# Patient Record
Sex: FEMALE | Race: WHITE | NOT HISPANIC OR LATINO | Employment: FULL TIME | URBAN - METROPOLITAN AREA
[De-identification: names, ages, dates, MRNs, and addresses within clinical notes are randomized per-mention and may not be internally consistent; named-entity substitution may affect disease eponyms.]

---

## 2017-01-04 ENCOUNTER — HOSPITAL ENCOUNTER (OUTPATIENT)
Dept: NON INVASIVE DIAGNOSTICS | Facility: HOSPITAL | Age: 52
Discharge: HOME | End: 2017-01-04
Attending: INTERNAL MEDICINE
Payer: COMMERCIAL

## 2017-01-04 DIAGNOSIS — I50.9 HEART FAILURE (HCC): ICD-10-CM

## 2017-01-04 PROCEDURE — C8929 TTE W OR WO FOL WCON,DOPPLER: HCPCS

## 2017-01-04 RX ADMIN — PERFLUTREN 1.3 ML/MIN: 6.52 INJECTION, SUSPENSION INTRAVENOUS at 07:50

## 2017-02-08 ENCOUNTER — GENERIC CONVERSION - ENCOUNTER (OUTPATIENT)
Dept: OTHER | Facility: OTHER | Age: 52
End: 2017-02-08

## 2017-02-16 ENCOUNTER — ALLSCRIPTS OFFICE VISIT (OUTPATIENT)
Dept: OTHER | Facility: OTHER | Age: 52
End: 2017-02-16

## 2017-04-05 ENCOUNTER — ALLSCRIPTS OFFICE VISIT (OUTPATIENT)
Dept: OTHER | Facility: OTHER | Age: 52
End: 2017-04-05

## 2017-06-23 ENCOUNTER — ALLSCRIPTS OFFICE VISIT (OUTPATIENT)
Dept: OTHER | Facility: OTHER | Age: 52
End: 2017-06-23

## 2017-06-23 DIAGNOSIS — I50.1 LEFT VENTRICULAR FAILURE (HCC): ICD-10-CM

## 2017-07-14 ENCOUNTER — GENERIC CONVERSION - ENCOUNTER (OUTPATIENT)
Dept: OTHER | Facility: OTHER | Age: 52
End: 2017-07-14

## 2017-07-19 LAB
ALBUMIN SERPL BCP-MCNC: 4.1 G/DL
ALP SERPL-CCNC: 70 U/L
ALT SERPL W P-5'-P-CCNC: 13 U/L
ANION GAP SERPL CALCULATED.3IONS-SCNC: 1.3 MMOL/L
AST SERPL W P-5'-P-CCNC: 12 U/L
BILIRUB SERPL-MCNC: 0.6 MG/DL
BUN SERPL-MCNC: 16 MG/DL
CALCIUM SERPL-MCNC: 8.9 MG/DL
CHLORIDE SERPL-SCNC: 96 MMOL/L
CO2 SERPL-SCNC: 23 MMOL/L
CREAT SERPL-MCNC: 0.76 MG/DL
EGFR AFRICAN AMERICAN (HISTORICAL): 107
EGFR-AMERICAN CALC (HISTORICAL): 92
GLUCOSE SERPL-MCNC: 141 MG/DL
HBA1C MFR BLD HPLC: 6.9 %
MAGNESIUM SERPL-MCNC: 1.9 MG/DL
POTASSIUM SERPL-SCNC: 4.4 MMOL/L
SODIUM SERPL-SCNC: 137 MMOL/L
TOTAL PROTEIN (HISTORICAL): 7.3

## 2017-09-28 ENCOUNTER — ALLSCRIPTS OFFICE VISIT (OUTPATIENT)
Dept: OTHER | Facility: OTHER | Age: 52
End: 2017-09-28

## 2017-09-28 ENCOUNTER — GENERIC CONVERSION - ENCOUNTER (OUTPATIENT)
Dept: OTHER | Facility: OTHER | Age: 52
End: 2017-09-28

## 2017-10-13 ENCOUNTER — ALLSCRIPTS OFFICE VISIT (OUTPATIENT)
Dept: OTHER | Facility: OTHER | Age: 52
End: 2017-10-13

## 2017-11-21 DIAGNOSIS — Z12.31 ENCOUNTER FOR SCREENING MAMMOGRAM FOR MALIGNANT NEOPLASM OF BREAST: ICD-10-CM

## 2018-01-10 ENCOUNTER — ALLSCRIPTS OFFICE VISIT (OUTPATIENT)
Dept: OTHER | Facility: OTHER | Age: 53
End: 2018-01-10

## 2018-01-10 DIAGNOSIS — M53.3 SACROCOCCYGEAL DISORDERS, NOT ELSEWHERE CLASSIFIED: ICD-10-CM

## 2018-01-10 DIAGNOSIS — M54.50 LOW BACK PAIN: ICD-10-CM

## 2018-01-11 ENCOUNTER — TRANSCRIBE ORDERS (OUTPATIENT)
Dept: ADMINISTRATIVE | Facility: HOSPITAL | Age: 53
End: 2018-01-11

## 2018-01-11 ENCOUNTER — HOSPITAL ENCOUNTER (OUTPATIENT)
Dept: RADIOLOGY | Facility: HOSPITAL | Age: 53
Discharge: HOME | End: 2018-01-11
Payer: COMMERCIAL

## 2018-01-11 ENCOUNTER — GENERIC CONVERSION - ENCOUNTER (OUTPATIENT)
Dept: OTHER | Facility: OTHER | Age: 53
End: 2018-01-11

## 2018-01-11 DIAGNOSIS — M54.50 LOW BACK PAIN: ICD-10-CM

## 2018-01-11 DIAGNOSIS — M53.3 SACROCOCCYGEAL DISORDERS, NOT ELSEWHERE CLASSIFIED: ICD-10-CM

## 2018-01-11 PROCEDURE — 72220 X-RAY EXAM SACRUM TAILBONE: CPT

## 2018-01-11 PROCEDURE — 72100 X-RAY EXAM L-S SPINE 2/3 VWS: CPT

## 2018-01-12 VITALS
SYSTOLIC BLOOD PRESSURE: 125 MMHG | DIASTOLIC BLOOD PRESSURE: 70 MMHG | OXYGEN SATURATION: 96 % | HEART RATE: 84 BPM | BODY MASS INDEX: 44.41 KG/M2 | HEIGHT: 68 IN | WEIGHT: 293 LBS

## 2018-01-12 NOTE — PROGRESS NOTES
Assessment   1  Sacral back pain (724 6) (M53 3)   2  Low back pain (724 2) (M54 5)   3  BMI 50 0-59 9, adult (V85 43) (Z68 43)   4  Acute systolic congestive heart failure (428 21,428 0) (I50 21)    Plan   Low back pain, Sacral back pain    · * XR SACRUM AND COCCYX; Status:Active; Requested [id number];    · * XR SPINE LUMBAR 2 OR 3 VIEWS INJURY; Status:Active; Requested [id number]; Discussion/Summary      XR ordered given trauma  Advised to continue Aleve, may take 2 every 12 hours  Possible side effects of new medications were reviewed with the patient/guardian today  The treatment plan was reviewed with the patient/guardian  The patient/guardian understands and agrees with the treatment plan      Chief Complaint   Pt c/o pain in her tail bone for three weeks  states fell at home on Tomasz day  er/cma  History of Present Illness   HPI: She fell on the ice and landed on her bottom almost 3 weeks ago  Pain seems to be getting worse  Worse when she sits on a soft surface, feels better on a hard surface  Has discomfort when she moves her bowels  radiates into her lower back, but not down her legs bowel or bladder dysfunction  Denies saddle anesthesia  tried Aleve and Ibuprofen which do not help  abrasion or bruising  numbness or tingling  Review of Systems        Constitutional: No fever, no chills, feels well, no tiredness, no recent weight gain or loss  Musculoskeletal: as noted in HPI  Integumentary: no skin wound  Neurological: no numbness-- and-- no tingling  Active Problems   1  Acute systolic congestive heart failure (428 21,428 0) (I50 21)   2  Benign essential hypertension (401 1) (I10)   3  Bilateral leg edema (782 3) (R60 0)   4  BMI 50 0-59 9, adult (V85 43) (Z68 43)   5  Chronic eczema (692 9) (L30 9)   6  CKD stage 1 due to type 2 diabetes mellitus (250 40,585 1) (E11 22,N18 1)   7  Degenerative arthritis of thoracic spine (721 2) (M47 814)   8   Depression screening (V79 0) (Z13 89)   9  Diabetes mellitus with renal manifestations, uncontrolled (250 42) (E11 29,E11 65)   10  General medical examination (V70 9) (Z00 00)   11  Generalized anxiety disorder (300 02) (F41 1)   12  Heart failure, left, with LVEF 41-49% (428 1) (I50 1)   13  Immunization due (V05 9) (Z23)   14  LBBB (left bundle branch block) (426 3) (I44 7)   15  Moderate obstructive sleep apnea (327 23) (G47 33)   16  Morbid obesity (278 01) (E66 01)   17  Never a smoker   18  ARMANDO (obstructive sleep apnea) (327 23) (G47 33)   19  Screening breast examination (V76 10) (Z12 31)    Past Medical History   1  History of Achilles tendinitis, unspecified laterality (726 71) (M76 60)   2  History of Acute lower UTI (599 0) (N39 0)   3  History of Acute maxillary sinusitis (461 0) (J01 00)   4  History of Acute nonsuppurative otitis media, unspecified laterality (381 00) (H65 199)   5  Acute otitis media, right (382 9) (H66 91)   6  History of Acute upper respiratory infection (465 9) (J06 9)   7  History of Acute upper respiratory infection (465 9) (J06 9)   8  History of Acute URI (465 9) (J06 9)   9  History of Acute UTI (599 0) (N39 0)   10  Benign essential hypertension (401 1) (I10)   11  History of Breast pain (611 71) (N64 4)   12  History of Bronchitis, asthmatic (493 90) (J45 909)   13  History of Chest pain on breathing (786 52) (R07 1)   14  History of Chronic kidney disease, stage I (585 1) (N18 1)   15  History of Contact dermatitis due to plant (692 6) (L25 5)   16  History of Cramps, muscle, general (729 82) (R25 2)   17  Exposure to potentially hazardous body fluids (V15 85) (Z77 21)   18  History of acute bronchitis (V12 69) (Z87 09)   19  History of acute pharyngitis (V12 69) (Z87 09)   20  History of acute pharyngitis (V12 69) (Z87 09)   21  History of acute sinusitis (V12 69) (Z87 09)   22  History of arthritis (V13 4) (Z87 39)   23  History of asthma (V12 69) (Z87 09)   24   History of backache (V13 59) (Z87 39)   25  History of breast lump (V13 89) (Z87 898)   26  History of dermatitis (V13 3) (Z87 2)   27  History of dermatitis (V13 3) (Z87 2)   28  History of diabetes mellitus (V12 29) (Z86 39)   29  History of environmental allergies (V15 09) (Z91 09)   30  History of hypertension (V12 59) (Z86 79)   31  History of infectious mononucleosis (V12 09) (Z86 19)   32  History of lipoma (V13 89) (Z86 018)   33  History of sleep apnea (V13 89) (Z86 69)   34  History of tension headache (V13 89) (Z87 898)   35  History of tinea cruris (V12 09) (Z86 19)   36  History of Hospital discharge follow-up (V67 59) (Z09)   37  History of Late effect of sprain and strain (905 7)   38  History of Lateral epicondylitis, unspecified laterality (726 32) (M77 10)   39  History of Mid back pain on right side (724 5) (M54 9)   40  History of Neoplasm of bone (239 2) (D49 2)   41  History of Otalgia, unspecified laterality (388 70) (H92 09)   42  History of Other muscle spasm (728 85) (M62 838)   43  History of Pregnancy Complicated By Diabetes Mellitus (V12 21)   44  History of Sialodochitis (527 2)   45  History of Situational anxiety (300 09) (F41 8)   46  History of Tongue disorder (529 9) (K14 9)   47  History of Trauma to eye, left (921 9) (S05 92XA)   48  History of Type 2 diabetes mellitus with renal manifestations, controlled (250 40) (E11 29)   49  History of Urinary Tract Infection (V13 02)  Active Problems And Past Medical History Reviewed: The active problems and past medical history were reviewed and updated today  Family History   Mother    1  Family history of Diabetes Mellitus (V18 0)   2  Family history of diabetes mellitus (V18 0) (Z83 3)  Father    3  Family history of Atrial Fibrillation   4  Family history of arthritis (V17 7) (Z82 61)  Sister    5  Family history of arthritis (V17 7) (Z82 61)  Grandparent    6  Family history of cardiac disorder (V17 49) (Z82 49)   7   Family history of diabetes mellitus (V18 0) (Z83 3)   8  Family history of malignant neoplasm (V16 9) (Z80 9)  Family History    9  Family history of arthritis (V17 7) (Z82 61)    Social History    ·    · Employed   · Has 2 children   · Lack of adequate sleep (V69 4) (Z72 820)   · Lack of exercise (V69 0) (Z72 3)   · Never a smoker   · Never a smoker   · No alcohol use   · Pets/Animals: Dog   ·  (V61 03) (Z63 5)  The social history was reviewed and is unchanged  Surgical History   1  History of Colonoscopy (Fiberoptic) Screening   2  History of Gallbladder Surgery   3  History of Tubal Ligation    Current Meds    1  Carvedilol 6 25 MG Oral Tablet; TAKE ONE TABLET BY MOUTH TWICE DAILY WITH     MORNING AND EVENING MEALS  Requested for: 16KHU3019; Last BM:36TKN6558     Ordered   2  Losartan Potassium 25 MG Oral Tablet; TAKE 1 TABLET DAILY; Therapy: 79DFV0607 to (Evaluate:20Mar2018)  Requested for: 23Jun2017; Last     Rx:23Jun2017 Ordered   3  MetFORMIN HCl  MG Oral Tablet Extended Release 24 Hour; TAKE 1 TABLET BY     MOUTH EVERY DAY AS DIRECTED; Therapy: 59MSB8496 to (GRWBMKXO:76RRO1459)  Requested for: 46Bik1103; Last     Rx:28Zhd0074 Ordered   4  ProAir  (90 Base) MCG/ACT Inhalation Aerosol Solution; INHALE 1-2 PUFFS     EVERY 4-6 HOURS AS NEEDED AND AS DIRECTED; Therapy: 06HTL9680 to (Last Rx:13Oct2017)  Requested for: 34Vaz6223 Ordered   5  Spironolactone 25 MG Oral Tablet; TAKE 1 TABLET DAILY  Requested for: 43LGU8497;     Last DR:06VXI8230 Ordered   6  Torsemide 20 MG Oral Tablet; ONE TABLET EVERY OTHERY DAY  Requested for:     88QRT7778; Last MX:72VSE3684 Ordered     The medication list was reviewed and updated today  Allergies   1   VIOXX    Vitals    Recorded: [id number] 01:37PM   Temperature 98 3 F   Heart Rate 60   Respiration 20   Systolic 224   Diastolic 70   Height 5 ft 8 in   Weight 332 lb    BMI Calculated 50 48   BSA Calculated 2 54     Physical Exam        Constitutional General appearance: No acute distress, well appearing and well nourished  Eyes      Conjunctiva and lids: No swelling, erythema or discharge  Ears, Nose, Mouth, and Throat      Otoscopic examination: Tympanic membranes translucent with normal light reflex  Canals patent without erythema  Nasal mucosa, septum, and turbinates: Normal without edema or erythema  Oropharynx: Normal with no erythema, edema, exudate or lesions  Pulmonary      Respiratory effort: No increased work of breathing or signs of respiratory distress  Auscultation of lungs: Clear to auscultation  Cardiovascular      Auscultation of heart: Normal rate and rhythm, normal S1 and S2, without murmurs  Examination of extremities for edema and/or varicosities: Normal        Musculoskeletal tenderness on palpation over sacrum  Skin      Skin and subcutaneous tissue: Normal without rashes or lesions  Psychiatric      Mood and affect: Normal           Attending Note   Collaborating Physician Note: Collaborating Note: I agree with the Advanced Practitioner note  Message   Return to work or school:    Brown Lunch is under my professional care  She was seen in my office on 1/10/18           [address] Appointments      Date/Time Provider Specialty Site   02/08/2018 05:00 PM YAMILETH Maxwell   Cardiology [facility] [address] [facility]     Signatures    Electronically signed by : Lauren Sheffield; Zachary 10 2018  3:01PM EST                       (Author)     Electronically signed by : Leticia Ellison DO; Zachary 10 2018 10:31PM EST                       (Author)

## 2018-01-13 VITALS
HEART RATE: 84 BPM | HEIGHT: 68 IN | SYSTOLIC BLOOD PRESSURE: 144 MMHG | WEIGHT: 293 LBS | DIASTOLIC BLOOD PRESSURE: 84 MMHG | RESPIRATION RATE: 20 BRPM | BODY MASS INDEX: 44.41 KG/M2 | TEMPERATURE: 97 F

## 2018-01-14 VITALS
RESPIRATION RATE: 18 BRPM | HEART RATE: 74 BPM | WEIGHT: 293 LBS | TEMPERATURE: 97.4 F | HEIGHT: 68 IN | SYSTOLIC BLOOD PRESSURE: 132 MMHG | DIASTOLIC BLOOD PRESSURE: 80 MMHG | BODY MASS INDEX: 44.41 KG/M2

## 2018-01-15 NOTE — MISCELLANEOUS
Message  Return to work or school:   Kimberly Limon is under my professional care  She was seen in my office on 10/13/2017        DAFNE Atkinson        Signatures   Electronically signed by : Urvashi Henry; Oct 13 2017 10:08AM EST                       (Author)    Electronically signed by : Oziel Niño DO; Oct 13 2017  9:57PM EST                       (Author)

## 2018-01-16 NOTE — PROCEDURES
Procedures by Kenyetta Mae MD at 7/20/2016  10:52 AM      Author:  Kenyetta Mae MD Service:  Cardiology Author Type:  Physician     Filed:  7/20/2016 11:09 AM Date of Service:  7/20/2016 10:52 AM Status:  Signed     :  Kenyetta Mae MD (Physician)            Procedures  Cardiac Catheterization Operative Report    Maria Esther Session  [id number]  7/20/2016  Juanita Mark DO     : Kenyetta Mae MD  Reason for cath: Acute systolic heart failure GW11%,                                    Abnormal nuclear stress  Access: R Radial Artery  Procedure performed: L Heart Cath        L Coronary Angiogram      Ventriculogram  Contrast: 77cc  Sedation: 1mg IV Versed 50mcg IV Fentanyl    Procedure Details  The risks, benefits, complications, treatment options, and expected outcomes were discussed with the patient  The patient and/or family concurred with the proposed plan, giving informed consent  Patient was brought to the cath lab after IV hydration was  begun and oral premedication was given  She was further sedated with fentanyl and midazolam  She was prepped and draped in the usual manner  Using the modified Seldinger access technique, a 5 [place] sheath was placed in the  radial artery was placed in the femoral veins  2000 units Heparin, 200 mcg of nitroglycerin, and 2 5mg of Versed was administered  A left heart catheterization was done with JR4 and JL4 catheter without difficulty  A pigtail catheter crossed the aortic  valve and left ventriculogram and catheterization was done without difficulty  After the procedure was completed, sedation was stopped and the sheaths and catheters were all removed  Hemostasis was achieved with vascular band      Findings:    Hemodynamics /82 Mean 88  /19 LVEDP30   Left Main Short caliber which immediately bifurcated to LAD & LCX   RCA Non-dominant vessel which is widely patent without angiographic evidence of significant atherosclerosis   LAD Average caliber vessel widely patent which wraps around the apex to supply the inferior apex  Widely patent without angiographic evidence of atherosclerosis  Circ Large caliber vessel which is dominant with an immediate sharp take-off of the short Lmain  Widely patent without angiographic evidence of significant atherosclerosis  LV  Mildly dilated  EF40-45%  Mild diffuse hypokinesis with no focal wall motion abnormalities  Estimated Blood Loss:  Minimal         Complications:  None; patient tolerated the procedure well  Disposition: hemodynamically stable and PACU - hemodynamically stable           Condition: stable    A/P No evidence of significant coronary atherosclerosis         Significantly elevated filling pressures LVEDP 30         Mild nonischemic heart failure EF40-45%  -- continue diuresis  -- recommend starting carvedilol + lisinopril prior to discharge  -- follow-up as outpatient           Ramonita FERRER    Jul 20 2016 11:08AM Chris Standard Time

## 2018-01-16 NOTE — MISCELLANEOUS
History of Present Illness  Macie Kerr is a 52year old female who was recently admitted to [address] [place] [address] on 7/19-7/22/16 with acute systolic heart failure  Seen by Dr Wes Leslie, Cardiology  She presented with SOB and LE edema  LVEF found to be 40-45%, CC showed Normal coronaries, treated with IV Lasix, Transitioned to torsemide  Hgb A1C 8 5  Discharge weight 349 pounds  Discharged on Torsemide 20mg daily, spironolactone 25mg daily  Today I called [facility] for an initial heart failure follow up phone call  There was not answer  I left a message to return my phone call  [facility] has a follow up appointment with Dr Wes Leslie on August 9th  Current Meds   1  Amoxicillin 875 MG Oral Tablet; TAKE 1 TABLET twice a day for 10days; Therapy: 61DQA0101 to (Evaluate:19Jun2016)  Requested for: 25HGU4303; Last   Rx:09Jun2016 Ordered   2  Desoximetasone 0 25 % External Cream; apply sparingly to affected areas qhs short   term; Therapy: 69YQJ9274 to (Last Rx:09Jun2016)  Requested for: 38KJP0379 Ordered   3  Fluconazole 150 MG Oral Tablet; 1 Every Day x 1; Therapy: 76QVD5869 to (Last Rx:09Jun2016)  Requested for: 45SBA9376 Ordered   4  Triamcinolone Acetonide 0 1 % Mouth/Throat Paste; APPLY SPARINGLY 3 TIMES A DAY; Therapy: 14YOD3028 to (Evaluate:15Jun2016)  Requested for: 69VSQ6602; Last   Rx:09Jun2016 Ordered    Future Appointments    Date/Time Provider Specialty Site   08/03/2016 06:15 PM Author [address]   08/09/2016 03:00 PM YAMILETH Arthur  Cardiology [facility]     Allergies    1   VIOXX    Signatures   Electronically signed by : Mita Ross, ; Aug  3 2016 10:37AM EST                       (Author)

## 2018-01-17 NOTE — RESULT NOTES
Verified Results  * MAMMO SCREENING BILATERAL W CAD 34Ynb86 04:51PM Precilla Sear     Test Name Result Flag Reference   MAMMO SCREENING BILATERAL W CAD (Report)     Patient History:   Family history of breast cancer in maternal grandmother at age    61  Patient's BMI is 47 3  Reason for exam: screening (asymptomatic)  Mammo Screening Bilateral W CAD: October 26, 2016 - Check In #:    [de-identified]   Bilateral CC and MLO view(s) were taken  Technologist: Dionne Peterson   Prior study comparison: February 8, 2014, bilateral screening    mammogram performed at [place] [address]  November 12, 2011, bilateral screening mammogram performed at [place] [address]  There are scattered fibroglandular densities  No new dominant    soft tissue mass, architectural distortion or suspicious    calcifications are noted  The skin and nipple structures are    within normal limits  Benign appearing calcifications are noted  No mammographic evidence of malignancy  No    significant changes when compared with prior studies  ASSESSMENT: BiRad:2 - Benign     Recommendation:   Routine screening mammogram of both breasts in 1 year  Analyzed by CAD     8-10% of cancers will be missed on mammography  Management of a    palpable abnormality must be based on clinical grounds  Patients   will be notified of their results via letter from our facility  Accredited by [employer] of Radiology and FDA  Transcription Location: [facility] 98: [id number]     Risk Value(s):   Tyrer-Cuzick 10 Year: 4 472%, Tyrer-Cuzick Lifetime: 20 085%,    Myriad Table: 1 5%, GAYLA 5 Year: 1 0%, NCI Lifetime: 10 2%   Signed by:   Carlos A Iyv MD   10/27/16       Discussion/Summary   Mammogram is normal   Repeat in one year is recommended     Dr Ascencion Garvey

## 2018-01-22 VITALS
RESPIRATION RATE: 20 BRPM | DIASTOLIC BLOOD PRESSURE: 80 MMHG | HEIGHT: 68 IN | HEART RATE: 82 BPM | BODY MASS INDEX: 44.41 KG/M2 | TEMPERATURE: 97.6 F | SYSTOLIC BLOOD PRESSURE: 124 MMHG | WEIGHT: 293 LBS

## 2018-01-22 VITALS
WEIGHT: 293 LBS | DIASTOLIC BLOOD PRESSURE: 90 MMHG | HEIGHT: 68 IN | SYSTOLIC BLOOD PRESSURE: 146 MMHG | BODY MASS INDEX: 44.41 KG/M2

## 2018-01-22 VITALS
RESPIRATION RATE: 20 BRPM | DIASTOLIC BLOOD PRESSURE: 70 MMHG | WEIGHT: 293 LBS | BODY MASS INDEX: 44.41 KG/M2 | TEMPERATURE: 98.3 F | HEART RATE: 60 BPM | SYSTOLIC BLOOD PRESSURE: 112 MMHG | HEIGHT: 68 IN

## 2018-01-23 NOTE — MISCELLANEOUS
Message  Return to work or school:   Dona Oscar is under my professional care  She was seen in my office on 1/10/18        Ifeanyi Traore, [address]        Future Appointments    Signatures   Electronically signed by : Rosita Joshi; Zachary 10 2018  3:01PM EST                       (Author)    Electronically signed by : Nishi Swan DO; Zachary 10 2018 10:31PM EST                       (Author)

## 2018-01-24 NOTE — MISCELLANEOUS
Assessment   1  Type 2 diabetes mellitus (250 00) (E11 9)1   2  Benign essential hypertension (401 1) (I10)1   3  Acute systolic congestive heart failure (428 21,428 0) (I50 21)1   4  Never a smoker1      1 Amended By: Francisco Negron ; Aug 03 2016 6:36 PM EST    Discussion/Summary  Discussion Summary:   Diabetes - A1c was 8 5 in the hospital, she was started on metformin  Diabetic education ordered  Congestive Heart Failure - she is going to see Dr Wilfredo Nguyen next week  Hypertension - controlled  1        1 Amended By: Francisco Negron ; Aug 03 2016 6:20 PM EST    Chief Complaint  Spoke with patient on 7/22/16 after her Hospital discharge on 7/22/16  Patient went home, states has no chest pressure or pain, is breathing well and has no swelling in her legs, has diarrhea since morning of discharge and states was advised at the Hospital to call Dr Wilfredo Nguyen on Monday 7/25/16 if still has Diarrhea  patient will have a Sleep Study done 7/27/16 at the Hospital  patient states is taking her medications as prescribed at the Hospital  medications were not reviewed (patient was unable and prefer  to bring medications at her Hospital follow up appointment with Dr Antonio Nicholas on 8/03/16  Patient is aware to call our office if she has any questions or concern, patient is aware to go to ER if she gets chest pain-pressure, dyspnea, dizziness, weakness, nausea  etc    er/cma  Chief Complaint Free Text Note Form: pt seen for TCM  ac/cma1        1 Amended By: Wes Soto; Aug 03 2016 6:13 PM EST    History of Present Illness  TCM Communication [facility]: The patient is being contacted for follow-up after hospitalization  The date of admission: 7/19/16, date of discharge: 7/22/16  Diagnosis: Acute Systolic CHF  She was discharged to home  Medications were not reviewed today  She scheduled a follow up appointment  Follow-up appointments with other specialists: Patient will call Dr Wilfredo Nguyen on 7/25/16 for follow up on Diarrhea  Counseling was provided to the patient  Topics counseled included instructions for management, risk factor reductions, patient and family education, activities of daily living and importance of compliance with treatment  Communication performed and completed by er/cma  HPI: She was in the hospital for congestive heart failure  She was also found to be a diabetic  She is trying to eat a healthier diet  1        1 Amended By: Keiry Severino ; Aug 03 2016 6:19 PM EST    Review of Systems  Complete-Female:   Constitutional:1  feeling tired1   Cardiovascular:1  No complaints of slow heart rate, no fast heart rate, no chest pain, no palpitations, no leg claudication, no lower extremity edema1         1 Amended By: Keiry Severino ; Aug 03 2016 6:43 PM EST    Active Problems   1  Actinic keratosis (702 0) (L57 0)  2  Benign essential hypertension (401 1) (I10)  3  BMI 50 0-59 9, adult (V85 43) (Z68 43)  4  Chronic eczema (692 9) (L30 9)  5  Degenerative arthritis of thoracic spine (721 2) (M47 814)  6  Depression screening (V79 0) (Z13 89)  7  General medical examination (V70 9) (Z00 00)  8  Generalized anxiety disorder (300 02) (F41 1)  9  Immunization due (V05 9) (Z23)  10  Lipoma (214 9) (D17 9)  11  Morbid obesity (278 01) (E66 01)  12  Prediabetes (790 29) (R73 09)  13  Situational anxiety (300 09) (F41 8)  14  Tongue disorder (529 9) (K14 9)    Past Medical History   1  History of Achilles tendinitis, unspecified laterality (726 71) (M76 60)  2  History of Acute lower UTI (599 0) (N39 0)  3  History of Acute maxillary sinusitis (461 0) (J01 00)  4  History of Acute nonsuppurative otitis media, unspecified laterality (381 00) (H65 199)  5  History of Acute upper respiratory infection (465 9) (J06 9)  6  History of Acute upper respiratory infection (465 9) (J06 9)  7  History of Acute UTI (599 0) (N39 0)  8  History of Breast pain (611 71) (N64 4)  9  History of Bronchitis, asthmatic (493 90) (J45 909)  10  History of Chest pain on breathing (786 52) (R07 1)  11  History of Chronic kidney disease, stage I (585 1) (N18 1)  12  History of Contact dermatitis due to plant (692 6) (L25 5)  13  Exposure to potentially hazardous body fluids (V15 85) (Z77 21)  14  History of acute bronchitis (V12 69) (Z87 09)  15  History of acute pharyngitis (V12 69) (Z87 09)  16  History of acute pharyngitis (V12 69) (Z87 09)  17  History of backache (V13 59) (Z87 39)  18  History of breast lump (V13 89) (Z87 898)  19  History of dermatitis (V13 3) (Z87 2)  20  History of dermatitis (V13 3) (Z87 2)  21  History of infectious mononucleosis (V12 09) (Z86 19)  22  History of tension headache (V13 89) (Z87 898)  23  History of tinea cruris (V12 09) (Z86 19)  24  History of Late effect of sprain and strain (905 7)  25  History of Lateral epicondylitis, unspecified laterality (726 32) (M77 10)  26  History of Mid back pain on right side (724 5) (M54 9)  27  History of Neoplasm of bone (239 2) (D49 2)  28  History of Otalgia, unspecified laterality (388 70) (H92 09)  29  History of Other muscle spasm (728 85) (M62 838)  30  History of Pregnancy Complicated By Diabetes Mellitus (V12 21)  31  History of Sialodochitis (527 2)  32  History of Trauma to eye, left (921 9) (S05 92XA)  33  History of Type 2 diabetes mellitus (250 00) (E11 9)  34  History of Type 2 diabetes mellitus with renal manifestations, controlled (250 40)    (E11 29)  35  History of Urinary Tract Infection (V13 02)    Family History  Mother   1  Family history of Diabetes Mellitus (V18 0)  Father   2  Family history of Atrial Fibrillation    Social History    · Never a smoker1     Social History Reviewed: The social history was reviewed and updated today  1        1 Amended By: Chandra Watson ; Aug 03 2016 6:27 PM EST    Current Meds  1  Amoxicillin 875 MG Oral Tablet; TAKE 1 TABLET twice a day for 10days;    Therapy: 37LTD6972 to (Evaluate:19Jun2016)  Requested for: 74TLH1751; Last VO:41YSZ5041 Ordered  2  Desoximetasone 0 25 % External Cream; apply sparingly to affected areas qhs short   term; Therapy: 96FSY4060 to (Last Rx:09Jun2016)  Requested for: 65ORG3377 Ordered  3  Fluconazole 150 MG Oral Tablet; 1 Every Day x 1; Therapy: 04GUK9820 to (Last Rx:09Jun2016)  Requested for: 52VER9830 Ordered  4  Triamcinolone Acetonide 0 1 % Mouth/Throat Paste; APPLY SPARINGLY 3 TIMES A DAY; Therapy: 72DEO2728 to (Evaluate:15Jun2016)  Requested for: 15QGX1888; Last   Rx:09Jun2016 Ordered    Allergies   1  VIOXX    Vitals  Signs   Recorded: 36Jhw0147 06:34PM     1,2  Blood Pressure: 2   1,2   135 mm Hg 2     1,2  Blood Pressure: 2   1,2   78 mm Hg 2   Recorded: 63DBP5290 06:11PM     1,2  Blood Pressure: 2   1,2   140 mm Hg 2     1,2  Blood Pressure: 2   1,2   78 mm Hg 2    Heart Rate: 68 1    Respiration: 24 1    Temperature: 97 1 F 1    Height: 5 ft 7 5 in 1    Weight: 346 lb  1    BMI Calculated: 1   1,2   53 39 kg/m2 2    BSA Calculated: 1   1,2   2 57 m2 2      1 Amended By: Governor Duran ; Aug 03 2016 6:32 PM EST   2 Amended By: Governor Duran ; Aug 03 2016 6:40 PM EST    Physical Exam    Constitutional1    General appearance: No acute distress, well appearing and well nourished  1    Ears, Nose, Mouth, and Throat1    External inspection of ears and nose: Normal 1    Otoscopic examination: Tympanic membranes translucent with normal light reflex  Canals patent without erythema  1    Oropharynx: Normal with no erythema, edema, exudate or lesions  1    Pulmonary1    Auscultation of lungs: Clear to auscultation  1    Cardiovascular1    Auscultation of heart: Normal rate and rhythm, normal S1 and S2, without murmurs  1    Examination of extremities for edema and/or varicosities: Abnormal  1  Bilateral pretibial pitting edema1           1 Amended By: Governor Duran ; Aug 03 2016 6:35 PM EST    Message   Recorded as Task   Date: 07/22/2016 01:21 PM, Created By: System   Task Name: Sarah Roblero To: Bipin Ramirez   Regarding Patient: Leonora Najera, Status: Active   Comment:    System - 2016 1:21 PM     Patient discharged from hospital   Patient Name: Martins [place]  Patient YOB: 1965  Discharge Date: 2016  Facility: [facility]     Future Appointments    Date/Time Provider Specialty Site   2016 06:15 PM Lex Wang, [address]   Electronically signed by : Francy Vo, ; 2016  3:10PM EST                       (Co-author)    Electronically signed by : Deshawn Troncoso DO; 2016  8:47PM EST                       (Author)    Electronically signed by : Deshawn Troncoso DO; Aug  3 2016  6:43PM EST                       (Author)

## 2018-01-29 DIAGNOSIS — E11.8 TYPE 2 DIABETES MELLITUS WITH COMPLICATION, WITHOUT LONG-TERM CURRENT USE OF INSULIN (HCC): Primary | ICD-10-CM

## 2018-01-29 DIAGNOSIS — I50.21 ACUTE SYSTOLIC CHF (CONGESTIVE HEART FAILURE) (HCC): ICD-10-CM

## 2018-01-29 RX ORDER — METFORMIN HYDROCHLORIDE 500 MG/1
TABLET, EXTENDED RELEASE ORAL
Qty: 30 TABLET | Refills: 0 | Status: SHIPPED | OUTPATIENT
Start: 2018-01-29 | End: 2018-02-07 | Stop reason: SDUPTHER

## 2018-01-29 RX ORDER — SPIRONOLACTONE 25 MG/1
1 TABLET ORAL DAILY
COMMUNITY
End: 2018-02-08 | Stop reason: SDUPTHER

## 2018-01-29 RX ORDER — LOSARTAN POTASSIUM 25 MG/1
1 TABLET ORAL DAILY
COMMUNITY
Start: 2016-08-09 | End: 2018-02-08 | Stop reason: SDUPTHER

## 2018-01-29 RX ORDER — ALBUTEROL SULFATE 90 UG/1
1-2 AEROSOL, METERED RESPIRATORY (INHALATION)
COMMUNITY
Start: 2017-10-13

## 2018-01-29 RX ORDER — METFORMIN HYDROCHLORIDE 500 MG/1
1 TABLET, EXTENDED RELEASE ORAL DAILY
COMMUNITY
Start: 2017-07-11 | End: 2018-02-07

## 2018-01-29 RX ORDER — CARVEDILOL 6.25 MG/1
TABLET ORAL
COMMUNITY
End: 2018-02-08 | Stop reason: SDUPTHER

## 2018-01-29 RX ORDER — TORSEMIDE 20 MG/1
TABLET ORAL
COMMUNITY
End: 2018-07-22 | Stop reason: SDUPTHER

## 2018-01-29 RX ORDER — LORATADINE 10 MG/1
1 TABLET ORAL
COMMUNITY
Start: 2017-10-13 | End: 2019-02-04 | Stop reason: ALTCHOICE

## 2018-02-07 DIAGNOSIS — E11.8 TYPE 2 DIABETES MELLITUS WITH COMPLICATION, WITHOUT LONG-TERM CURRENT USE OF INSULIN (HCC): ICD-10-CM

## 2018-02-07 RX ORDER — METFORMIN HYDROCHLORIDE 500 MG/1
500 TABLET, EXTENDED RELEASE ORAL DAILY
Qty: 30 TABLET | Refills: 2 | Status: SHIPPED | OUTPATIENT
Start: 2018-02-07 | End: 2018-05-14 | Stop reason: SDUPTHER

## 2018-02-08 ENCOUNTER — OFFICE VISIT (OUTPATIENT)
Dept: CARDIOLOGY CLINIC | Facility: CLINIC | Age: 53
End: 2018-02-08
Payer: COMMERCIAL

## 2018-02-08 VITALS
HEIGHT: 69 IN | DIASTOLIC BLOOD PRESSURE: 78 MMHG | HEART RATE: 84 BPM | WEIGHT: 293 LBS | SYSTOLIC BLOOD PRESSURE: 130 MMHG | OXYGEN SATURATION: 97 % | BODY MASS INDEX: 43.4 KG/M2

## 2018-02-08 DIAGNOSIS — G47.33 OSA (OBSTRUCTIVE SLEEP APNEA): ICD-10-CM

## 2018-02-08 DIAGNOSIS — R60.0 BILATERAL LEG EDEMA: ICD-10-CM

## 2018-02-08 DIAGNOSIS — E11.8 TYPE 2 DIABETES MELLITUS WITH COMPLICATION, WITHOUT LONG-TERM CURRENT USE OF INSULIN (HCC): ICD-10-CM

## 2018-02-08 DIAGNOSIS — I44.7 LBBB (LEFT BUNDLE BRANCH BLOCK): ICD-10-CM

## 2018-02-08 DIAGNOSIS — I50.1: Primary | ICD-10-CM

## 2018-02-08 PROCEDURE — 99214 OFFICE O/P EST MOD 30 MIN: CPT | Performed by: INTERNAL MEDICINE

## 2018-02-08 PROCEDURE — 93000 ELECTROCARDIOGRAM COMPLETE: CPT | Performed by: INTERNAL MEDICINE

## 2018-02-08 PROCEDURE — 4010F ACE/ARB THERAPY RXD/TAKEN: CPT | Performed by: FAMILY MEDICINE

## 2018-02-08 RX ORDER — SPIRONOLACTONE 25 MG/1
25 TABLET ORAL DAILY
Qty: 90 TABLET | Refills: 3 | Status: SHIPPED | OUTPATIENT
Start: 2018-02-08 | End: 2018-04-12 | Stop reason: SDUPTHER

## 2018-02-08 RX ORDER — CARVEDILOL 6.25 MG/1
6.25 TABLET ORAL 2 TIMES DAILY WITH MEALS
Qty: 180 TABLET | Refills: 3 | Status: SHIPPED | OUTPATIENT
Start: 2018-02-08 | End: 2018-07-22 | Stop reason: SDUPTHER

## 2018-02-08 RX ORDER — LOSARTAN POTASSIUM 25 MG/1
25 TABLET ORAL DAILY
Qty: 90 TABLET | Refills: 3 | Status: SHIPPED | OUTPATIENT
Start: 2018-02-08 | End: 2018-12-30 | Stop reason: SDUPTHER

## 2018-02-08 NOTE — PROGRESS NOTES
Cardiology Follow Up  Mohamud Pennington  1965  [id number]  [address] [place] [address] [address] [address] [address] [address] [address]    1  Left heart failure with left ejection fraction 41-49 percent (HCC)  POCT ECG   2  Type 2 diabetes mellitus with complication, without long-term current use of insulin ([facility] )     3  ARMANDO (obstructive sleep apnea)     4  LBBB (left bundle branch block)     5  BMI 50 0-59 9, adult ([facility] )     6  Bilateral leg edema       1  Chronic systolic heart failure NYHA class 2 nonischemic- compensated on exam  Continue low salt diet + exercise  Continue carvedilol 6 25mg + losartan 25mg daily + aldactone 25mg  Recommend check creatinine, K+, Na+ twice a year  2  Dm2- tight control  Will follow-up with pcp asap    3  ARMANDO- weight loss  Will consider tonsillectomy    4  LBBB- old  Likely from previous dilated cm/hf    Rtc- one year     Discussion/Plan:  Initail visit: 53 yo pleasant woman hx morbid obesity, nonischemic systolic heart failure ZW56-03%, LBBB, sleep apnea presents for follow-up  Lower extremity edema has signifcantly improved but remains  Initially improved but stopped torsemide for a couple of days  Has had diarrhea possibly secondary to metformin on lisinopril  Lisinopril held and reports continued diarrhea? Denies having chest pain  Shortness of breath improved  Just received CPAP and is about to start using it 6/23: She has not been compliant with her CPAP device secondary to severe claustrophobia  She is using her diuretic about once or twice a week  Her weight has been stable  She denies having any exertional chest heaviness  She denies having significant palpitations  She denies having any PND or orthopnea  She continues to have diarrhea with metformin usage but her sugars have been improved  She is not very active and does not have an exercise program due to lack of time   She is watching her salt intake  2/8/18: She has not had significant edema in her legs  Her weight has been stable  She is using medications without dizziness  She is using the torsemide once every 11-12 days  Slipped on ice  Denies having shortness of breath on exertion  Not using CPAP  Pending tonsillectomy in the summer            Interval History:    Patient Active Problem List   Diagnosis    [facility]congestive [facility] failure[facility]    Diabetes mellitus ([facility] )    ARMANDO (obstructive sleep apnea)    Morbid obesity ([facility])    Bilateral leg edema    BMI 50 0-59 9, adult ([facility] )    LBBB (left bundle branch block)     Past Medical History:   Diagnosis Date    Anxiety     Arthritis     Asthma     Bilateral leg edema     BMI 50 0-59 9, adult ([place] [address] )     Clotting disorder ([place] [address] )     Diabetes mellitus ([place] [address] )     Eczema     Heart failure, left, with LVEF 41-49% ([facility])     Hypertension     LBBB (left bundle branch block)     LBBB (left bundle branch block) 2/8/2018    Psychiatric disorder      Social History     Social History    Marital status:      Spouse name: N/A    Number of children: N/A    Years of education: N/A     Occupational History    Not on file       Social History Main Topics    Smoking status: Never Smoker    Smokeless tobacco: Never Used      Comment: N/a    Alcohol use No    Drug use: No    Sexual activity: Not on file     Other Topics Concern    Not on file     Social History Narrative    No narrative on file      Family History   Problem Relation Age of Onset    Heart attack Mother     Diabetes type II Mother     Heart disease Father     Atrial fibrillation Father      Past Surgical History:   Procedure Laterality Date    CHOLECYSTECTOMY      COLONOSCOPY      TUBAL LIGATION         Current Outpatient Prescriptions:     albuterol (PROAIR HFA) 90 mcg/act inhaler, Inhale 1-2 puffs, Disp: , Rfl:     carvedilol (COREG) 6 25 mg tablet, Take by mouth, Disp: , Rfl:    losartan (COZAAR) 25 mg tablet, Take 1 tablet by mouth daily, Disp: , Rfl:     metFORMIN (GLUCOPHAGE-XR) 500 mg 24 hr tablet, Take 1 tablet (500 mg total) by mouth daily As directed, Disp: 30 tablet, Rfl: 2    spironolactone (ALDACTONE) 25 mg tablet, Take 1 tablet by mouth daily, Disp: , Rfl:     torsemide (DEMADEX) 20 mg tablet, Take by mouth, Disp: , Rfl:     loratadine (CLARITIN) 10 mg tablet, Take 1 tablet by mouth, Disp: , Rfl:   Allergies   Allergen Reactions    Vioxx [Rofecoxib] Swelling       Review of Systems:  Review of Systems   Constitutional: Negative  Negative for activity change, appetite change, chills, diaphoresis, fatigue, fever and unexpected weight change  HENT: Negative  Negative for congestion, dental problem, drooling, ear discharge, ear pain, facial swelling, hearing loss, mouth sores, nosebleeds, postnasal drip, rhinorrhea, sinus pain, sinus pressure, sneezing, sore throat, tinnitus, trouble swallowing and voice change  Eyes: Negative  Negative for photophobia, pain, redness, itching and visual disturbance  Respiratory: Negative  Negative for apnea, cough, choking, chest tightness, shortness of breath, wheezing and stridor  Cardiovascular: Negative  Negative for chest pain, palpitations and leg swelling  Gastrointestinal: Negative  Negative for abdominal distention, abdominal pain, anal bleeding, blood in stool, constipation, diarrhea, nausea, rectal pain and vomiting  Endocrine: Negative  Negative for cold intolerance, heat intolerance, polydipsia, polyphagia and polyuria  Genitourinary: Negative  Negative for decreased urine volume, difficulty urinating, dyspareunia, dysuria, enuresis, flank pain, frequency, genital sores, hematuria, menstrual problem, pelvic pain, urgency, vaginal bleeding, vaginal discharge and vaginal pain  Musculoskeletal: Negative  Negative for arthralgias, back pain, gait problem, joint swelling, myalgias, neck pain and neck stiffness  Skin: Negative  Negative for color change, pallor, rash and wound  Allergic/Immunologic: Negative  Negative for environmental allergies, food allergies and immunocompromised state  Neurological: Negative  Negative for dizziness, tremors, seizures, syncope, facial asymmetry, speech difficulty, weakness, light-headedness, numbness and headaches  Hematological: Negative  Negative for adenopathy  Does not bruise/bleed easily  Psychiatric/Behavioral: Positive for sleep disturbance  Negative for agitation, behavioral problems, confusion, decreased concentration, dysphoric mood, hallucinations, self-injury and suicidal ideas  The patient is not nervous/anxious and is not hyperactive  All other systems reviewed and are negative  Vitals:    02/08/18 1653   BP: 130/78   BP Location: Right arm   Patient Position: Sitting   Cuff Size: Large   Pulse: 84   SpO2: 97%   Weight: (!) 150 kg (331 lb)   Height: 5' 9" (1 753 m)     Physical Exam:  Physical Exam   Constitutional: She is oriented to person, place, and time  No distress  Obesity, pleasant   HENT:   Head: Normocephalic and atraumatic  Right Ear: External ear normal    Left Ear: External ear normal    Eyes: Conjunctivae are normal  Pupils are equal, round, and reactive to light  Right eye exhibits no discharge  Left eye exhibits no discharge  No scleral icterus  Neck: Normal range of motion  Neck supple  No JVD present  No tracheal deviation present  No thyromegaly present  Cardiovascular: Normal rate and regular rhythm  Exam reveals gallop  Exam reveals no friction rub  No murmur heard  Pulmonary/Chest: Effort normal and breath sounds normal  No stridor  No respiratory distress  She has no wheezes  She has no rales  She exhibits no tenderness  Abdominal: Soft  Bowel sounds are normal  She exhibits no distension and no mass  There is no tenderness  There is no rebound and no guarding  Musculoskeletal: Normal range of motion   She exhibits no edema, tenderness or deformity  Neurological: She is alert and oriented to person, place, and time  She has normal reflexes  No cranial nerve deficit  She exhibits normal muscle tone  Coordination normal    Skin: Skin is warm and dry  No rash noted  She is not diaphoretic  No erythema  No pallor  Psychiatric: She has a normal mood and affect  Her behavior is normal  Judgment and thought content normal        Labs:   CBC with diff:      Invalid input(s):  RBC, TOTALCELLSCOUNTED, SEGS%, GRANS%, LYMPHS%, EOS%, BASO%, ABNEUT, ABGRANS, ABLYMPHS, ABMOMOS, ABEOS, ABBASO    CMP:      Magnesium:    Coags:    TSH:    Lipid Profile:    Hgb A1c:    NT-proBNP: No results for input(s): NTBNP in the last 72 hours  Imaging & Testing   I have personally reviewed pertinent reports  EKG: Personally reviewed  Normal sinus rhythm LBBB    Cardiac testing:   Results for orders placed during the hospital encounter of 17   Echo complete with contrast if indicated    Narrative [address]  [address] [address]  [phone]    Transthoracic Echocardiogram  2D, M-mode, Doppler, and Color Doppler    Study date:  2017    Patient: Prashant iPckett  MR number: [id number]  Account number: [de-identified]  : 1965  Age: 54 years  Gender: Female  Status: Routine  Location: Echo lab  Height: 69 in  Weight: 348  3 lb  BP: 128/ 74 mmHg    Indications: Heart Failure    Diagnoses: 428 9 - HEART FAILURE NOS    Sonographer:  Emre Pak  Primary Physician:  Cleopatra Johnson DO  Referring Physician:  Maritza Gaston MD  Group:  [facility]  RN:  JEANNETTE Campbell  Interpreting Physician:  Beau Smith MD    SUMMARY    LEFT VENTRICLE:  The ventricle was mildly dilated  Systolic function was mildly to moderately reduced  Ejection fraction was  estimated in the range of 35 % to 45 % to be 40 %   difficult to assess accurate  EF due to limited study and paradoxical septal motion, consider MUGA or Cardiac  MRI as confirmation  There was mild diffuse hypokinesis with paradoxical septal motion  Wall thickness was mildly increased  There was mild concentric hypertrophy  RIGHT VENTRICLE:  The tricuspid jet envelope definition was inadequate for estimation of RV  systolic pressure  There are no indirect findings (abnormal RV volume or  geometry, altered pulmonary flow velocity profile, or leftward septal  displacement) which would suggest moderate or severe pulmonary hypertension  LEFT ATRIUM:  The atrium was mildly dilated  MITRAL VALVE:  There was trace regurgitation  HISTORY: PRIOR HISTORY: HTN, DM, Anxiety, Asthma, Arthritis    PROCEDURE: The procedure was performed in the echo lab  This was a routine  study  The transthoracic approach was used  The study included complete 2D  imaging, M-mode, complete spectral Doppler, and color Doppler  The heart rate  was 90 bpm, at the start of the study  Intravenous contrast (Definity solution  [1 3 ml Definity/8 7ml normal saline solution], 2 ml) was administered to  opacify the left ventricle  Intravenous contrast ( 1 ml) was administered  Echocardiographic views were limited due to poor acoustic window availability,  decreased penetration, and lung interference  This was a technically difficult  study  LEFT VENTRICLE: The ventricle was mildly dilated  Systolic function was mildly  to moderately reduced  Ejection fraction was estimated in the range of 35 % to  45 % to be 40 %  difficult to assess accurate EF due to limited study and  paradoxical septal motion, consider MUGA or Cardiac MRI as confirmation There  was mild diffuse hypokinesis with paradoxical septal motion  Wall thickness was  mildly increased  There was mild concentric hypertrophy  DOPPLER: Left  ventricular diastolic function parameters were normal for the patient's age      RIGHT VENTRICLE: The size was normal  Systolic function was normal  DOPPLER:  The tricuspid jet envelope definition was inadequate for estimation of RV  systolic pressure  There are no indirect findings (abnormal RV volume or  geometry, altered pulmonary flow velocity profile, or leftward septal  displacement) which would suggest moderate or severe pulmonary hypertension  LEFT ATRIUM: The atrium was mildly dilated  RIGHT ATRIUM: Size was normal     MITRAL VALVE: Valve structure was normal  There was normal leaflet separation  DOPPLER: The transmitral velocity was within the normal range  There was no  evidence for stenosis  There was trace regurgitation  AORTIC VALVE: The valve was trileaflet  Leaflets exhibited normal thickness and  normal cuspal separation  DOPPLER: Transaortic velocity was within the normal  range  There was no evidence for stenosis  There was no regurgitation  TRICUSPID VALVE: DOPPLER: There was no significant regurgitation  PERICARDIUM: There was no thickening or calcification  There was no pericardial  effusion  AORTA: The root exhibited normal size  SYSTEMIC VEINS: IVC: The inferior vena cava was not well visualized  SYSTEM MEASUREMENT TABLES    2D mode  AoR Diam 2D: 3 3 cm  LA Diam (2D): 4 9 cm  LA/Ao (2D): 1 48  FS (2D Teich): 15 2 %  IVSd (2D): 1 18 cm  LVDEV: 170 cm³  LVESV: 116 cm³  LVIDd(2D): 5 85 cm  LVISd (2D): 4 96 cm  LVPWd (2D): 1 18 cm  SV (Teich): 54 cm³    Apical four chamber  LVEF A4C: 33 %    Apical two chamber  LA Area: 26 8 cm squared  LA Volume: 91 cm³    Unspecified Scan Mode  MV Peak A Dejan: 563 mm/s  MV Peak E Dejan   Mean: 1110 mm/s  MVA (PHT): 4 89 cm squared  PHT: 45 ms  Max P mm[Hg]  V Max: 2590 mm/s  Vmax: 2610 mm/s  RA Area: 13 7 cm squared  RA Volume: 35 4 cm³  TAPSE: 2 cm    [employer] Accredited Echocardiography Laboratory    Prepared and electronically signed by    Aziza Negron MD  Signed 2017 11:04:22           Adali Jaffe  Please call with any questions or suggestions    A description of the counseling:   Goals and Barriers:  Patient's ability to self care:  Medication side effect reviewed with patient in detail and all their questions answered  "This note has been constructed using a voice recognition system  Therefore there may be syntax, spelling, and/or grammatical errors   Please call if you have any questions  "

## 2018-02-10 ENCOUNTER — HOSPITAL ENCOUNTER (OUTPATIENT)
Dept: RADIOLOGY | Facility: HOSPITAL | Age: 53
Discharge: HOME | End: 2018-02-10
Attending: FAMILY MEDICINE
Payer: COMMERCIAL

## 2018-02-10 DIAGNOSIS — Z12.31 ENCOUNTER FOR SCREENING MAMMOGRAM FOR MALIGNANT NEOPLASM OF BREAST: ICD-10-CM

## 2018-02-10 PROCEDURE — 77067 SCR MAMMO BI INCL CAD: CPT

## 2018-03-07 NOTE — CONSULTS
Assessment    1  History of asthma (V12 69) (Z87 09)   2  History of environmental allergies (V15 09) (Z91 09)   3  History of hypertension (V12 59) (Z86 79)   4  History of arthritis (V13 4) (Z87 39)   5  History of diabetes mellitus (V12 29) (Z86 39)   6  Family history of cardiac disorder (V17 49) (Z82 49) : Grandparent   7  Family history of malignant neoplasm (V16 9) (Z80 9) : Grandparent   8  Family history of diabetes mellitus (V18 0) (Z83 3) : Mother, Grandparent   5  Family history of arthritis (V17 7) (Z82 61) : Father, Sister, Family History   8  No alcohol use   11  Never a smoker   12     15  Has 2 children   14  Employed   13  Moderate obstructive sleep apnea (327 23) (G47 33)    Chief Complaint  Chief Complaint Free Text Note Form: Patient is here to consult for a CPAP alternative  She does c/o sore throat and swollen glands and right ear pain  She states drainage from right every 4-6 weeks in the AM  She also c/o cough, non-productive for two days      History of Present Illness  HPI: She presents with obstructive sleep apnea, AHI 24, lowest oxygen saturation was 74%  She was prescribed nasal CPAP but did not tolerate it well due to claustrophobia  She denies nasal obstruction or congestion  She denies difficulty breathing through her nose  She denies any new weight gain over the past year  When she used the mask, she was able to get restful sleep  She has associated snoring  She notes a 50 lb weight gain between 1-2 years ago, which made her symptoms worse  Review of Systems  Complete ENT ROS [facility]:   Eyes: No complaints of itching, excessive tearing or vision changes  Ears: discharge from the ears  Nose: No nasal obstruction, no discharge or runniness, no bleeding, no dryness, no sneezing and no loss of smell  Mouth: loose tooth and dental problems, but as noted in HPI  Throat: No complaints of throat pain, no difficulty swallowing, no hoarseness     Neck: No neck soreness, no neck pain, no neck lumps or swelling  Genitourinary: No complaints of dysuria, flank pain or frequent urination  Cardiovascular: No complaints of chest pain or palpitations  Respiratory: No complaints of shortness of breath, cough or wheezing  Gastrointestinal: No complaints of heartburn, nausea/vomiting, or constipation  Neurological: No complaints of headache, convulsions or memory loss  ROS Reviewed:   ROS reviewed  Active Problems    1  Actinic keratosis (702 0) (L57 0)   2  Acute sinusitis (461 9) (J01 90)   3  Acute systolic congestive heart failure (428 21,428 0) (I50 21)   4  Benign essential hypertension (401 1) (I10)   5  Bilateral leg edema (782 3) (R60 0)   6  BMI 50 0-59 9, adult (V85 43) (Z68 43)   7  Breast cancer screening, high risk patient (V76 11) (Z12 31)   8  Chronic eczema (692 9) (L30 9)   9  CKD stage 1 due to type 2 diabetes mellitus (250 40,585 1) (E11 22,N18 1)   10  Cramps, muscle, general (729 82) (R25 2)   11  Degenerative arthritis of thoracic spine (721 2) (M47 814)   12  Depression screening (V79 0) (Z13 89)   13  Diabetes mellitus with renal manifestations, uncontrolled (250 42) (E11 29,E11 65)   14  General medical examination (V70 9) (Z00 00)   15  Generalized anxiety disorder (300 02) (F41 1)   16  Heart failure, left, with LVEF 41-49% (428 1) (I50 1)   17  Immunization due (V05 9) (Z23)   18  LBBB (left bundle branch block) (426 3) (I44 7)   19  Morbid obesity (278 01) (E66 01)   20  Never a smoker   21  ARMANDO (obstructive sleep apnea) (327 23) (G47 33)    Past Medical History    1  History of Achilles tendinitis, unspecified laterality (726 71) (M76 60)   2  History of Acute lower UTI (599 0) (N39 0)   3  History of Acute maxillary sinusitis (461 0) (J01 00)   4  History of Acute nonsuppurative otitis media, unspecified laterality (381 00) (H65 199)   5  Acute otitis media, right (382 9) (H66 91)   6   History of Acute upper respiratory infection (170 9) (J06 9)   7  History of Acute upper respiratory infection (465 9) (J06 9)   8  History of Acute UTI (599 0) (N39 0)   9  Benign essential hypertension (401 1) (I10)   10  History of Breast pain (611 71) (N64 4)   11  History of Bronchitis, asthmatic (493 90) (J45 909)   12  History of Chest pain on breathing (786 52) (R07 1)   13  History of Chronic kidney disease, stage I (585 1) (N18 1)   14  History of Contact dermatitis due to plant (692 6) (L25 5)   15  Exposure to potentially hazardous body fluids (V15 85) (Z77 21)   16  History of acute bronchitis (V12 69) (Z87 09)   17  History of acute pharyngitis (V12 69) (Z87 09)   18  History of acute pharyngitis (V12 69) (Z87 09)   19  History of arthritis (V13 4) (Z87 39)   20  History of asthma (V12 69) (Z87 09)   21  History of backache (V13 59) (Z87 39)   22  History of breast lump (V13 89) (Z87 898)   23  History of dermatitis (V13 3) (Z87 2)   24  History of dermatitis (V13 3) (Z87 2)   25  History of diabetes mellitus (V12 29) (Z86 39)   26  History of environmental allergies (V15 09) (Z91 09)   27  History of hypertension (V12 59) (Z86 79)   28  History of infectious mononucleosis (V12 09) (Z86 19)   29  History of lipoma (V13 89) (Z86 018)   30  History of sleep apnea (V13 89) (Z86 69)   31  History of tension headache (V13 89) (Z87 898)   32  History of tinea cruris (V12 09) (Z86 19)   33  History of Hospital discharge follow-up (V67 59) (Z09)   34  History of Late effect of sprain and strain (905 7)   35  History of Lateral epicondylitis, unspecified laterality (726 32) (M77 10)   36  History of Mid back pain on right side (724 5) (M54 9)   37  History of Neoplasm of bone (239 2) (D49 2)   38  History of Otalgia, unspecified laterality (388 70) (H92 09)   39  History of Other muscle spasm (728 85) (M62 838)   40  History of Pregnancy Complicated By Diabetes Mellitus (V12 21)   41  History of Sialodochitis (527 2)   42   History of Situational anxiety (300 09) (F41 8)   43  History of Tongue disorder (529 9) (K14 9)   44  History of Trauma to eye, left (921 9) (S05 92XA)   45  History of Type 2 diabetes mellitus with renal manifestations, controlled (250 40)    (E11 29)   46  History of Urinary Tract Infection (V13 02)  Past Medical History Reviewed: The past medical history was reviewed and updated today  Surgical History    1  History of Colonoscopy (Fiberoptic) Screening   2  History of Gallbladder Surgery   3  History of Tubal Ligation  Surgical History Reviewed: The surgical history was reviewed and updated today  Family History  Mother    1  Family history of Diabetes Mellitus (V18 0)  Father    2  Family history of Atrial Fibrillation   3  Family history of arthritis (V17 7) (Z82 61)  Sister    4  Family history of arthritis (V17 7) (Z82 61)  Grandparent    5  Family history of malignant neoplasm (V16 9) (Z80 9)  Family History    6  Family history of arthritis (V17 7) (Z82 61)  Family History Reviewed: The family history was reviewed and updated today  Social History    ·    · Employed   · Has 2 children   · Lack of adequate sleep (V69 4) (Z72 820)   · Lack of exercise (V69 0) (Z72 3)   · Never a smoker   · Never a smoker   · No alcohol use   · Pets/Animals: Dog   ·  (V61 03) (Z63 5)  Social History Reviewed: The social history was reviewed and updated today  Current Meds   1  Carvedilol 6 25 MG Oral Tablet; TAKE ONE TABLET BY MOUTH TWICE DAILY WITH   MORNING AND EVENING MEALS  Requested for: 58CSZ2651; Last [id number]   Ordered   2  Losartan Potassium 25 MG Oral Tablet; TAKE 1 TABLET DAILY; Therapy: 86HJT6865 to (Evaluate:20Mar2018)  Requested for: 23Jun2017; Last   Rx:23Jun2017 Ordered   3  MetFORMIN HCl  MG Oral Tablet Extended Release 24 Hour; TAKE 1 TABLET BY   MOUTH EVERY DAY AS DIRECTED; Therapy: 14DKK4527 to (Evaluate:47Iir7583)  Requested for: 28Aug2017; Last   Rx:88Mdk4538 Ordered   4  Spironolactone 25 MG Oral Tablet; TAKE 1 TABLET DAILY  Requested for: 85XBX5853;   Last PI:[id number] Ordered   5  Torsemide 20 MG Oral Tablet; ONE TABLET EVERY OTHERY DAY  Requested for:   96WTK8540; Last GM:[id number] Ordered    Allergies    1  VIOXX    Vitals  Signs   Recorded: 73LOZ8169 77:61UT   Systolic: 857, LUE, Sitting  Diastolic: 90, LUE, Sitting  Height: 5 ft 8 in  Weight: 336 lb   BMI Calculated: 51 09  BSA Calculated: 2 55    Physical Exam    Constitutional:  Well developed, morbidly obese, well nourished and groomed, in no acute distress  Eyes:  Extra-ocular movements intact, pupils equally round and reactive to light and accommodation, the lids and conjunctivae are normal in appearance  HEENT:    Head: Atraumatic, normocephalic, no visible scalp lesions, bony palpation unremarkable without stepoffs, parotid and submandibular salivary glands non-tender to palpation and without masses bilaterally  Ears:  Auricles normal in appearance bilaterally, mastoid prominence non-tender, external auditory canals clear bilaterally, tympanic membranes intact bilaterally without evidence of middle ear effusion or masses, normal appearing ossicles  Nose/Sinuses:  External appearance unremarkable, no maxillary or frontal sinus tenderness to palpation bilaterally, anterior rhinoscopy reveals normal appearing mucosa, without polyps or masses  Oral Cavity:  Moist mucus membranes, gums dentition unremarkable, no oral mucosal masses or lesions, floor of mouth soft, tongue mobile without masses or lesions  Oropharynx:  Base of tongue soft and without masses, tonsils bilaterally 3+, soft palate mucosa unremarkable, laryngeal mirror exam unrevealing  Malampati Grade III view  Neck:  No visible or palpable cervical lesions or lymphadenopathy, thyroid gland is normal in size and symmetry and without masses, normal laryngeal elevation with swallowing       Cardiovascular:  Normal rate and rhythm, no palpable thrills, no jugulovenous distension observed  Respiratory:  Normal respiratory effort without evidence of retractions or use of accessory muscles  Integument:  Normal appearing without observed masses or lesions  Neurologic:  Cranial nerves II-XII intact bilaterally  Psychiatric:  Alert and oriented to time, place and person, normal affect  Discussion/Summary  Discussion Summary:   She has moderate ARMANDO and enlarged tonsils with an elongated palate and uvula  Given these physical exam findings, I believe she would benefit from a uvulopalatopharyngoplasty/tonsillectomy  However, I counseled her that without weight loss, the positive improvement in her AHI would likely not be permanent  We also discussed bariatric surgery as an alternative way to improve her ARMANDO  She will consider both these options and return if she desires surgery        Signatures   Electronically signed by : YAMILETH Bravo ; Sep 28 2017  9:23AM EST                       (Author)

## 2018-04-12 DIAGNOSIS — R60.0 BILATERAL LEG EDEMA: ICD-10-CM

## 2018-04-12 DIAGNOSIS — I50.21 ACUTE SYSTOLIC CHF (CONGESTIVE HEART FAILURE) (HCC): Primary | ICD-10-CM

## 2018-04-12 DIAGNOSIS — I44.7 LBBB (LEFT BUNDLE BRANCH BLOCK): ICD-10-CM

## 2018-04-12 RX ORDER — SPIRONOLACTONE 25 MG/1
TABLET ORAL
Qty: 30 TABLET | Refills: 0 | Status: SHIPPED | OUTPATIENT
Start: 2018-04-12 | End: 2018-07-22 | Stop reason: SDUPTHER

## 2018-04-22 RX ORDER — LORATADINE 10 MG/1
10 TABLET ORAL
Refills: 0 | Status: CANCELLED | OUTPATIENT
Start: 2018-04-22

## 2018-04-23 DIAGNOSIS — Z13.89 SCREENING FOR CARDIOVASCULAR, RESPIRATORY, AND GENITOURINARY DISEASES: ICD-10-CM

## 2018-04-23 DIAGNOSIS — Z13.6 SCREENING FOR CARDIOVASCULAR, RESPIRATORY, AND GENITOURINARY DISEASES: ICD-10-CM

## 2018-04-23 DIAGNOSIS — Z13.83 SCREENING FOR CARDIOVASCULAR, RESPIRATORY, AND GENITOURINARY DISEASES: ICD-10-CM

## 2018-04-23 DIAGNOSIS — E11.22 TYPE 2 DIABETES MELLITUS WITH CHRONIC KIDNEY DISEASE, WITHOUT LONG-TERM CURRENT USE OF INSULIN, UNSPECIFIED CKD STAGE (HCC): ICD-10-CM

## 2018-04-23 DIAGNOSIS — I50.21 ACUTE SYSTOLIC CHF (CONGESTIVE HEART FAILURE) (HCC): Primary | ICD-10-CM

## 2018-04-23 NOTE — TELEPHONE ENCOUNTER
Spoke to pt states she does not need the Claritin refill, and  Scheduled DM  Follow up for 5/14 af/rma

## 2018-05-13 PROBLEM — M54.50 LOW BACK PAIN: Status: ACTIVE | Noted: 2018-01-10

## 2018-05-13 PROBLEM — G47.33 MODERATE OBSTRUCTIVE SLEEP APNEA: Status: ACTIVE | Noted: 2017-09-28

## 2018-05-14 ENCOUNTER — OFFICE VISIT (OUTPATIENT)
Dept: FAMILY MEDICINE CLINIC | Facility: CLINIC | Age: 53
End: 2018-05-14
Payer: COMMERCIAL

## 2018-05-14 VITALS
DIASTOLIC BLOOD PRESSURE: 72 MMHG | RESPIRATION RATE: 20 BRPM | TEMPERATURE: 99.1 F | WEIGHT: 293 LBS | SYSTOLIC BLOOD PRESSURE: 100 MMHG | HEART RATE: 76 BPM | HEIGHT: 69 IN | BODY MASS INDEX: 43.4 KG/M2

## 2018-05-14 DIAGNOSIS — N18.1 CKD STAGE 1 DUE TO TYPE 2 DIABETES MELLITUS (HCC): ICD-10-CM

## 2018-05-14 DIAGNOSIS — E11.8 TYPE 2 DIABETES MELLITUS WITH COMPLICATION, WITHOUT LONG-TERM CURRENT USE OF INSULIN (HCC): ICD-10-CM

## 2018-05-14 DIAGNOSIS — E11.22 CKD STAGE 1 DUE TO TYPE 2 DIABETES MELLITUS (HCC): ICD-10-CM

## 2018-05-14 DIAGNOSIS — I50.1 HEART FAILURE, LEFT, WITH LVEF 41-49% (HCC): ICD-10-CM

## 2018-05-14 DIAGNOSIS — Z77.21 EXPOSURE TO POTENTIALLY HAZARDOUS BODY FLUIDS: ICD-10-CM

## 2018-05-14 DIAGNOSIS — E11.65 UNCONTROLLED TYPE 2 DIABETES MELLITUS WITH CHRONIC KIDNEY DISEASE, WITHOUT LONG-TERM CURRENT USE OF INSULIN, UNSPECIFIED CKD STAGE: Primary | ICD-10-CM

## 2018-05-14 DIAGNOSIS — J30.89 SEASONAL ALLERGIC RHINITIS DUE TO OTHER ALLERGIC TRIGGER: ICD-10-CM

## 2018-05-14 DIAGNOSIS — E11.22 UNCONTROLLED TYPE 2 DIABETES MELLITUS WITH CHRONIC KIDNEY DISEASE, WITHOUT LONG-TERM CURRENT USE OF INSULIN, UNSPECIFIED CKD STAGE: Primary | ICD-10-CM

## 2018-05-14 PROBLEM — J30.9 ALLERGIC RHINITIS DUE TO ALLERGEN: Status: ACTIVE | Noted: 2018-05-14

## 2018-05-14 PROCEDURE — 99214 OFFICE O/P EST MOD 30 MIN: CPT | Performed by: FAMILY MEDICINE

## 2018-05-14 RX ORDER — METFORMIN HYDROCHLORIDE 500 MG/1
500 TABLET, EXTENDED RELEASE ORAL DAILY
Qty: 90 TABLET | Refills: 0 | Status: SHIPPED | OUTPATIENT
Start: 2018-05-14 | End: 2018-05-15 | Stop reason: SDUPTHER

## 2018-05-14 NOTE — PATIENT INSTRUCTIONS
TEST STRIPS for Diabetes monitoring can be prescribed to your pharmacy  Since the test strips are unique to the [facility] of the glucose meter, it is in your best economic interest to find out the SPECIFIC BRAND and TYPE of test strip covered by your insurance company  You can find this out from your insurance company or participating pharmacy  Once you obtain this information, we can prescribe the specific branded test strip of your choosing  As a Type 2 Diabetic, it would be informative to check and record your blood sugars at home to help manage sugar control and identify any fluctuations that may be due to diet/exercise  You can check your sugar ONCE A DAY at [facility] DIFFERENT times in an alternating fashion: (1) before BREAKFAST, then the next day (2) before LUNCH, the following day (3) before DINNER, and the following day (4) before BEDTIME (then start over again)  Identifying trends may help you determine if changes are needed to what you eat, when you eat it, your activity level, and your medications  You should bring this records with you to your Diabetes follow-up appointments every 3 months

## 2018-05-14 NOTE — PROGRESS NOTES
Diabetic Foot Exam    Patient's shoes and socks removed  Right Foot/Ankle   Right Foot Inspection  Skin Exam: skin normal and skin intact no dry skin, no warmth, no callus, no erythema, no maceration, no abnormal color, no pre-ulcer, no ulcer and no callus                            Sensory       Monofilament testing: intact  Vascular    The right DP pulse is 2+  Left Foot/Ankle  Left Foot Inspection  Skin Exam: skin normal and skin intactno dry skin, no warmth, no erythema, no maceration, normal color, no pre-ulcer, no ulcer and no callus                                         Sensory       Monofilament: intact  Vascular    The left DP pulse is 2+  Assign Risk Category:  No deformity present;  No loss of protective sensation;        Risk: 0

## 2018-05-15 RX ORDER — METFORMIN HYDROCHLORIDE 500 MG/1
500 TABLET, EXTENDED RELEASE ORAL DAILY
Qty: 90 TABLET | Refills: 1 | OUTPATIENT
Start: 2018-05-15 | End: 2018-11-02 | Stop reason: SDUPTHER

## 2018-05-18 DIAGNOSIS — E11.65 UNCONTROLLED TYPE 2 DIABETES MELLITUS WITH CHRONIC KIDNEY DISEASE, WITHOUT LONG-TERM CURRENT USE OF INSULIN, UNSPECIFIED CKD STAGE: Primary | ICD-10-CM

## 2018-05-18 DIAGNOSIS — E11.22 UNCONTROLLED TYPE 2 DIABETES MELLITUS WITH CHRONIC KIDNEY DISEASE, WITHOUT LONG-TERM CURRENT USE OF INSULIN, UNSPECIFIED CKD STAGE: Primary | ICD-10-CM

## 2018-05-18 DIAGNOSIS — E11.8 TYPE 2 DIABETES MELLITUS WITH COMPLICATION, WITHOUT LONG-TERM CURRENT USE OF INSULIN (HCC): ICD-10-CM

## 2018-05-18 RX ORDER — METFORMIN HYDROCHLORIDE 500 MG/1
TABLET, EXTENDED RELEASE ORAL
Qty: 90 TABLET | Refills: 1 | Status: SHIPPED | OUTPATIENT
Start: 2018-05-18 | End: 2018-11-02

## 2018-05-20 LAB
ALBUMIN SERPL-MCNC: 4.3 G/DL (ref 3.5–5.5)
ALBUMIN/CREAT UR: 13.7 MG/G CREAT (ref 0–30)
ALBUMIN/GLOB SERPL: 1.3 {RATIO} (ref 1.2–2.2)
ALP SERPL-CCNC: 71 IU/L (ref 39–117)
ALT SERPL-CCNC: 10 IU/L (ref 0–32)
AMBIG ABBREV DEFAULT: NORMAL
AST SERPL-CCNC: 12 IU/L (ref 0–40)
BILIRUB SERPL-MCNC: 0.5 MG/DL (ref 0–1.2)
BUN SERPL-MCNC: 14 MG/DL (ref 6–24)
BUN/CREAT SERPL: 19 (ref 9–23)
CALCIUM SERPL-MCNC: 8.9 MG/DL (ref 8.7–10.2)
CHLORIDE SERPL-SCNC: 98 MMOL/L (ref 96–106)
CHOLEST SERPL-MCNC: 180 MG/DL (ref 100–199)
CO2 SERPL-SCNC: 25 MMOL/L (ref 18–29)
CREAT SERPL-MCNC: 0.73 MG/DL (ref 0.57–1)
CREAT UR-MCNC: 237.9 MG/DL
EST. AVERAGE GLUCOSE BLD GHB EST-MCNC: 180 MG/DL
GLOBULIN SER-MCNC: 3.4 G/DL (ref 1.5–4.5)
GLUCOSE SERPL-MCNC: 170 MG/DL (ref 65–99)
HBA1C MFR BLD: 7.9 % (ref 4.8–5.6)
HDLC SERPL-MCNC: 51 MG/DL
HIV 1+2 AB+HIV1 P24 AG SERPL QL IA: NON REACTIVE
LDLC SERPL CALC-MCNC: 100 MG/DL (ref 0–99)
MICROALBUMIN UR-MCNC: 32.7 UG/ML
MICRODELETION SYND BLD/T FISH: NORMAL
POTASSIUM SERPL-SCNC: 4.4 MMOL/L (ref 3.5–5.2)
PROT SERPL-MCNC: 7.7 G/DL (ref 6–8.5)
RPR SER QL: NON REACTIVE
SL AMB EGFR AFRICAN AMERICAN: 111 ML/MIN/1.73
SL AMB EGFR NON AFRICAN AMERICAN: 96 ML/MIN/1.73
SODIUM SERPL-SCNC: 137 MMOL/L (ref 134–144)
TRIGL SERPL-MCNC: 143 MG/DL (ref 0–149)

## 2018-05-23 ENCOUNTER — OFFICE VISIT (OUTPATIENT)
Dept: OBGYN CLINIC | Facility: CLINIC | Age: 53
End: 2018-05-23
Payer: COMMERCIAL

## 2018-05-23 ENCOUNTER — TELEPHONE (OUTPATIENT)
Dept: FAMILY MEDICINE CLINIC | Facility: CLINIC | Age: 53
End: 2018-05-23

## 2018-05-23 VITALS
WEIGHT: 293 LBS | SYSTOLIC BLOOD PRESSURE: 122 MMHG | BODY MASS INDEX: 43.4 KG/M2 | HEIGHT: 69 IN | DIASTOLIC BLOOD PRESSURE: 80 MMHG

## 2018-05-23 DIAGNOSIS — R53.83 FATIGUE, UNSPECIFIED TYPE: ICD-10-CM

## 2018-05-23 DIAGNOSIS — R63.5 WEIGHT GAIN: ICD-10-CM

## 2018-05-23 DIAGNOSIS — Z11.3 SCREENING EXAMINATION FOR VENEREAL DISEASE: ICD-10-CM

## 2018-05-23 DIAGNOSIS — Z01.419 GYNECOLOGIC EXAM NORMAL: Primary | ICD-10-CM

## 2018-05-23 DIAGNOSIS — E11.22 UNCONTROLLED TYPE 2 DIABETES MELLITUS WITH CHRONIC KIDNEY DISEASE, WITHOUT LONG-TERM CURRENT USE OF INSULIN, UNSPECIFIED CKD STAGE: Primary | ICD-10-CM

## 2018-05-23 DIAGNOSIS — E11.65 UNCONTROLLED TYPE 2 DIABETES MELLITUS WITH CHRONIC KIDNEY DISEASE, WITHOUT LONG-TERM CURRENT USE OF INSULIN, UNSPECIFIED CKD STAGE: Primary | ICD-10-CM

## 2018-05-23 PROCEDURE — S0610 ANNUAL GYNECOLOGICAL EXAMINA: HCPCS | Performed by: PHYSICIAN ASSISTANT

## 2018-05-23 RX ORDER — BLOOD-GLUCOSE METER
EACH MISCELLANEOUS DAILY
Qty: 1 KIT | Refills: 0 | Status: SHIPPED | OUTPATIENT
Start: 2018-05-23 | End: 2021-01-02

## 2018-05-23 NOTE — PROGRESS NOTES
Assessment/Plan   Problem List Items Addressed This Visit     Gynecologic exam normal - Primary     Pap guidelines reviewed, pap with HPV done today  Reviewed irregular cycles  Reviewed 1 year no menses equals menopause  Reviewed common symptoms or menopause  Will get TFTs done to evaluate fatigue, weight gain and temperature sensitivity  Reviewed with patient if symptoms of menopause are worsening can call office to discuss options  Return to office for annual or as needed  Relevant Orders    GP PAP + HPV PLUS + CT + GC      Other Visit Diagnoses     Weight gain        Relevant Orders    TSH, 3rd generation    T4, free    Fatigue, unspecified type        Relevant Orders    TSH, 3rd generation    T4, free    Screening examination for venereal disease        Relevant Orders    GP PAP + HPV PLUS + CT + GC          Subjective:     Patient ID: Brad Franco is a 50 y o  y o  female  HPI  49 yo seen for annual exam  LMP: 3/28/2018  Menses irregular  LMP: 3/28/2018  Have been more sporadic over the past year  Hx of Essure procedure  Last pap: 12/17/2014 LGSIL (+)HRHPV non 16, 18  Colpo: Benign  Hx of cryosurgery 1992  Last mammogram: Reports 3 months ago, benign  Denies bowel or bladder issues  Reports issues with weight gain and increased fatigue  Also feels more sensitive to temperature  Denies hair/skin/nail changes  Patient states has boyfriend but only sees every other week so does admit to other partners  Would like STD testing today  The following portions of the patient's history were reviewed and updated as appropriate:   She  has a past medical history of Achilles tendinitis, unspecified leg (12/14/2006); Anxiety; Arthritis; Asthma; Bilateral leg edema; BMI 50 0-59 9, adult ([facility] ); Breast lump (07/11/2008); Chest pain on breathing; Chronic kidney disease, stage 1; Clotting disorder ([facility] [address] ); Diabetes mellitus ([facility] ); Eczema; Environmental allergies;  Exposure to potentially hazardous body fluids; Heart failure, left, with LVEF 41-49% ([facility] ); Hypertension (05/30/2012); Infectious mononucleosis; Lateral epicondylitis (02/17/2011); LBBB (left bundle branch block); LBBB (left bundle branch block) (2/8/2018); Lipoma; 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); Psychiatric disorder; Sialodochitis (06/01/2010); Situational anxiety; Sleep apnea; Tongue disorder; and Type 2 diabetes mellitus with kidney complication, without long-term current use of insulin ([facility] )  She   Patient Active Problem List    Diagnosis Date Noted    Gynecologic exam normal 05/27/2018    Exposure to potentially hazardous body fluids 05/14/2018    Type 2 diabetes mellitus with complication, without long-term current use of insulin ([place] [address] ) 05/14/2018    Allergic rhinitis due to allergen 05/14/2018    Screening for cardiovascular, respiratory, and genitourinary diseases 04/23/2018    Bilateral leg edema 02/08/2018    BMI 50 0-59 9, adult ([place] [address] ) 02/08/2018    LBBB (left bundle branch block) 02/08/2018    Low back pain 01/10/2018    Moderate obstructive sleep apnea 09/28/2017    CKD stage 1 due to type 2 diabetes mellitus ([facility] ) 11/06/2016    Heart failure, left, with LVEF 41-49% ([facility] ) 08/10/2016    Diabetes mellitus with renal manifestations, uncontrolled ([place] [address] ) 07/22/2016    ARMANDO (obstructive sleep apnea) 07/22/2016    Morbid obesity ([place] [address] ) 67/42/7647    Acute systolic CHF (congestive heart failure) ([place] [address] ) 07/19/2016    Chronic eczema 06/09/2016    Degenerative arthritis of thoracic spine 02/13/2015    Actinic keratosis 09/04/2012    Benign essential hypertension 09/04/2012    Generalized anxiety disorder 09/04/2012     She  has a past surgical history that includes Cholecystectomy; Colonoscopy (1997); Gallbladder surgery (1996); Essure tubal ligation (2010); Cervical biopsy w/ loop electrode excision (1992); and Gynecologic cryosurgery (1992)    Her family history includes Arthritis in her family, father, and sister; Atrial fibrillation in her father; Bone cancer in her maternal grandfather; Brain cancer in her paternal grandfather; Breast cancer in her maternal grandmother; Diabetes type II in her mother; Heart attack in her mother; Heart disease in her father and maternal grandmother; Other in her other  She  reports that she has never smoked  She has never used smokeless tobacco  She reports that she does not drink alcohol or use drugs  Current Outpatient Prescriptions   Medication Sig Dispense Refill    albuterol (PROAIR HFA) 90 mcg/act inhaler Inhale 1-2 puffs      Blood Glucose Monitoring Suppl ([employer] Mobile App SYSTEM) w/Device KIT by Does not apply route daily [E11 8] 1 kit 0    carvedilol (COREG) 6 25 mg tablet Take 1 tablet (6 25 mg total) by mouth 2 (two) times a day with meals 180 tablet 3    glucose blood (ONETOUCH VERIO) test strip 1 each by Other route daily Use as instructed [E11 8] 100 each 5    loratadine (CLARITIN) 10 mg tablet Take 1 tablet by mouth      losartan (COZAAR) 25 mg tablet Take 1 tablet (25 mg total) by mouth daily 90 tablet 3    metFORMIN (GLUCOPHAGE-XR) 500 mg 24 hr tablet Take 1 tablet (500 mg total) by mouth daily As directed 90 tablet 1    metFORMIN (GLUCOPHAGE-XR) 500 mg 24 hr tablet TAKE 1 TABLET BY MOUTH DAILY AS DIRECTED 90 tablet 1    spironolactone (ALDACTONE) 25 mg tablet TAKE 1 TABLET BY MOUTH DAILY 30 tablet 0    torsemide (DEMADEX) 20 mg tablet Take by mouth       No current facility-administered medications for this visit  She is allergic to vioxx [rofecoxib]       Menstrual History:  OB History      Para Term  AB Living    2 2 1 1   2    SAB TAB Ectopic Multiple Live Births            2        Obstetric Comments    Pregnancy complicated by Diabetes Mellitus 2005           Menarche age: 8  Patient's last menstrual period was 2018 (exact date)    Period Pattern: (!) Irregular  Menstrual Flow: Moderate  Dysmenorrhea: (!) Mild  Dysmenorrhea Symptoms: Cramping  Review of Systems   Constitutional: Negative for fatigue, fever and unexpected weight change  HENT: Negative for dental problem and sinus pressure  Eyes: Negative for visual disturbance  Respiratory: Negative for cough, shortness of breath and wheezing  Cardiovascular: Negative for chest pain  Gastrointestinal: Negative for abdominal pain, blood in stool, constipation, diarrhea, nausea and vomiting  Endocrine: Negative for polydipsia  Genitourinary: Negative for difficulty urinating, dyspareunia, dysuria, frequency, hematuria, pelvic pain and urgency  Musculoskeletal: Negative for arthralgias and back pain  Neurological: Negative for dizziness, seizures, light-headedness and headaches  Psychiatric/Behavioral: Negative for suicidal ideas  The patient is not nervous/anxious  Objective:     Physical Exam   Constitutional: She is oriented to person, place, and time  She appears well-developed and well-nourished  Genitourinary: Rectum normal, vagina normal and uterus normal  There is no rash, tenderness, lesion, injury or Bartholin's cyst on the right labia  There is no rash, tenderness, lesion, injury or Bartholin's cyst on the left labia  Vagina exhibits no lesion  No erythema, tenderness or bleeding in the vagina  No signs of injury around the vagina  No vaginal discharge found  Right adnexum does not display mass, does not display tenderness and does not display fullness  Left adnexum does not display mass, does not display tenderness and does not display fullness  Cervix does not exhibit motion tenderness, lesion or discharge  Uterus is not enlarged, tender, exhibiting a mass, irregular (is regular) or mobile  Rectal exam shows no external hemorrhoid, no internal hemorrhoid, no fissure, no mass, no tenderness, anal tone normal and guaiac negative stool  HENT:   Head: Normocephalic and atraumatic  Neck: No thyromegaly present     Cardiovascular: Normal rate, regular rhythm and normal heart sounds  Exam reveals no gallop and no friction rub  No murmur heard  Pulmonary/Chest: Effort normal and breath sounds normal  No respiratory distress  She has no wheezes  Right breast exhibits no inverted nipple, no mass, no nipple discharge, no skin change and no tenderness  Left breast exhibits no inverted nipple, no mass, no nipple discharge, no skin change and no tenderness  Breasts are symmetrical  There is no breast swelling  Abdominal: Soft  She exhibits no distension and no mass  There is no tenderness  There is no rebound and no guarding  No hernia  Lymphadenopathy:     She has no cervical adenopathy  Right: No inguinal adenopathy present  Left: No inguinal adenopathy present  Neurological: She is alert and oriented to person, place, and time  Skin: Skin is warm and dry  Psychiatric: She has a normal mood and affect   Her behavior is normal

## 2018-05-23 NOTE — TELEPHONE ENCOUNTER
DR Shiva Lane    Patient needs a One touch Verio [employer] system kit and test strips  Please send to [facility] on  [facility] [place]    Also has questions about her lab work

## 2018-05-27 PROBLEM — Z01.419 GYNECOLOGIC EXAM NORMAL: Status: ACTIVE | Noted: 2018-05-27

## 2018-05-27 NOTE — ASSESSMENT & PLAN NOTE
Pap guidelines reviewed, pap with HPV done today  Reviewed irregular cycles  Reviewed 1 year no menses equals menopause  Reviewed common symptoms or menopause  Will get TFTs done to evaluate fatigue, weight gain and temperature sensitivity  Reviewed with patient if symptoms of menopause are worsening can call office to discuss options  Return to office for annual or as needed

## 2018-05-30 LAB
DEPRECATED C TRACH RRNA XXX QL PRB: NOT DETECTED
HPV HR 12 DNA CVX QL NAA+PROBE: DETECTED
HPV16 DNA SPEC QL NAA+PROBE: NOT DETECTED
HPV18 DNA SPEC QL NAA+PROBE: NOT DETECTED
N GONORRHOEA DNA UR QL NAA+PROBE: NOT DETECTED
THIN PREP CVX: ABNORMAL

## 2018-06-06 LAB
T4 FREE SERPL-MCNC: 1.03 NG/DL (ref 0.82–1.77)
TSH SERPL DL<=0.005 MIU/L-ACNC: 3.28 UIU/ML (ref 0.45–4.5)

## 2018-07-22 DIAGNOSIS — I44.7 LBBB (LEFT BUNDLE BRANCH BLOCK): ICD-10-CM

## 2018-07-22 DIAGNOSIS — R60.0 BILATERAL LEG EDEMA: ICD-10-CM

## 2018-07-23 RX ORDER — TORSEMIDE 20 MG/1
TABLET ORAL
Qty: 45 TABLET | Refills: 0 | Status: SHIPPED | OUTPATIENT
Start: 2018-07-23 | End: 2018-10-31 | Stop reason: SDUPTHER

## 2018-07-23 RX ORDER — CARVEDILOL 6.25 MG/1
TABLET ORAL
Qty: 180 TABLET | Refills: 0 | Status: SHIPPED | OUTPATIENT
Start: 2018-07-23 | End: 2018-12-31 | Stop reason: SDUPTHER

## 2018-07-23 RX ORDER — SPIRONOLACTONE 25 MG/1
TABLET ORAL
Qty: 90 TABLET | Refills: 0 | Status: SHIPPED | OUTPATIENT
Start: 2018-07-23 | End: 2018-10-31 | Stop reason: SDUPTHER

## 2018-10-31 DIAGNOSIS — I44.7 LBBB (LEFT BUNDLE BRANCH BLOCK): ICD-10-CM

## 2018-10-31 DIAGNOSIS — R60.0 BILATERAL LEG EDEMA: ICD-10-CM

## 2018-10-31 RX ORDER — SPIRONOLACTONE 25 MG/1
25 TABLET ORAL DAILY
Qty: 90 TABLET | Refills: 1 | Status: SHIPPED | OUTPATIENT
Start: 2018-10-31 | End: 2018-11-01 | Stop reason: SDUPTHER

## 2018-10-31 RX ORDER — TORSEMIDE 20 MG/1
20 TABLET ORAL EVERY OTHER DAY
Qty: 45 TABLET | Refills: 1 | Status: SHIPPED | OUTPATIENT
Start: 2018-10-31 | End: 2020-08-04

## 2018-11-01 DIAGNOSIS — I44.7 LBBB (LEFT BUNDLE BRANCH BLOCK): ICD-10-CM

## 2018-11-01 DIAGNOSIS — R60.0 BILATERAL LEG EDEMA: ICD-10-CM

## 2018-11-01 RX ORDER — SPIRONOLACTONE 25 MG/1
25 TABLET ORAL DAILY
Qty: 90 TABLET | Refills: 3 | Status: SHIPPED | OUTPATIENT
Start: 2018-11-01 | End: 2019-06-03 | Stop reason: SDUPTHER

## 2018-11-02 ENCOUNTER — OFFICE VISIT (OUTPATIENT)
Dept: FAMILY MEDICINE CLINIC | Facility: CLINIC | Age: 53
End: 2018-11-02
Payer: COMMERCIAL

## 2018-11-02 VITALS
HEIGHT: 69 IN | TEMPERATURE: 97.8 F | RESPIRATION RATE: 20 BRPM | BODY MASS INDEX: 43.4 KG/M2 | DIASTOLIC BLOOD PRESSURE: 72 MMHG | SYSTOLIC BLOOD PRESSURE: 120 MMHG | WEIGHT: 293 LBS | HEART RATE: 82 BPM

## 2018-11-02 DIAGNOSIS — M77.02 MEDIAL EPICONDYLITIS OF ELBOW, LEFT: ICD-10-CM

## 2018-11-02 DIAGNOSIS — E11.8 TYPE 2 DIABETES MELLITUS WITH COMPLICATION, WITHOUT LONG-TERM CURRENT USE OF INSULIN (HCC): ICD-10-CM

## 2018-11-02 DIAGNOSIS — N18.1 CKD STAGE 1 DUE TO TYPE 2 DIABETES MELLITUS (HCC): ICD-10-CM

## 2018-11-02 DIAGNOSIS — E11.22 CKD STAGE 1 DUE TO TYPE 2 DIABETES MELLITUS (HCC): ICD-10-CM

## 2018-11-02 DIAGNOSIS — I10 BENIGN ESSENTIAL HYPERTENSION: ICD-10-CM

## 2018-11-02 DIAGNOSIS — Z12.11 COLON CANCER SCREENING: ICD-10-CM

## 2018-11-02 DIAGNOSIS — IMO0002 DIABETES MELLITUS WITH RENAL MANIFESTATIONS, UNCONTROLLED: Primary | ICD-10-CM

## 2018-11-02 PROCEDURE — 99214 OFFICE O/P EST MOD 30 MIN: CPT | Performed by: FAMILY MEDICINE

## 2018-11-02 PROCEDURE — 3078F DIAST BP <80 MM HG: CPT | Performed by: FAMILY MEDICINE

## 2018-11-02 PROCEDURE — 1036F TOBACCO NON-USER: CPT | Performed by: FAMILY MEDICINE

## 2018-11-02 PROCEDURE — 3008F BODY MASS INDEX DOCD: CPT | Performed by: FAMILY MEDICINE

## 2018-11-02 PROCEDURE — 3074F SYST BP LT 130 MM HG: CPT | Performed by: FAMILY MEDICINE

## 2018-11-02 RX ORDER — METFORMIN HYDROCHLORIDE 500 MG/1
1000 TABLET, EXTENDED RELEASE ORAL DAILY
Qty: 180 TABLET | Refills: 1 | Status: SHIPPED | OUTPATIENT
Start: 2018-11-02 | End: 2019-02-27 | Stop reason: SDUPTHER

## 2018-11-02 NOTE — PROGRESS NOTES
Assessment/Plan:    No problem-specific Assessment & Plan notes found for this encounter  Diagnoses and all orders for this visit:    Diabetes mellitus with renal manifestations, uncontrolled ([facility] )    Colon cancer screening    Benign essential hypertension    CKD stage 1 due to type 2 diabetes mellitus ([facility] )    Type 2 diabetes mellitus with complication, without long-term current use of insulin ([facility] )              Return in about 3 months (around 2/2/2019)  Subjective:      Patient ID: Agustin Costa is a 50 y o  female      Chief Complaint   Patient presents with    Follow-up     blood sugar and medication review  rmklpn    L elbow very tender to touch     symptoms for the past month         HPI  Taking meds  Diuretics  Not very strict DM diet  High sugar after meals  No formal exercise program  No wt loss  htn stable  q3m f/u advised  smbg reviewed    The following portions of the patient's history were reviewed and updated as appropriate: allergies, current medications, past family history, past medical history, past social history, past surgical history and problem list     Review of Systems      Current Outpatient Prescriptions   Medication Sig Dispense Refill    albuterol (PROAIR HFA) 90 mcg/act inhaler Inhale 1-2 puffs      Blood Glucose Monitoring Suppl ([employer] SYSTEM) w/Device KIT by Does not apply route daily [E11 8] 1 kit 0    carvedilol (COREG) 6 25 mg tablet TAKE 1 TABLET BY MOUTH TWICE DAILY WITH MORNING AND EVENING MEALS 180 tablet 0    glucose blood (ONETOUCH VERIO) test strip 1 each by Other route daily Use as instructed [E11 8] 100 each 5    loratadine (CLARITIN) 10 mg tablet Take 1 tablet by mouth      losartan (COZAAR) 25 mg tablet Take 1 tablet (25 mg total) by mouth daily 90 tablet 3    metFORMIN (GLUCOPHAGE-XR) 500 mg 24 hr tablet Take 1 tablet (500 mg total) by mouth daily As directed 90 tablet 1    spironolactone (ALDACTONE) 25 mg tablet Take 1 tablet (25 mg total) by mouth daily 90 tablet 3    torsemide (DEMADEX) 20 mg tablet Take 1 tablet (20 mg total) by mouth every other day 45 tablet 1     No current facility-administered medications for this visit  Objective:    /72   Pulse 82   Temp 97 8 °F (36 6 °C)   Resp 20   Ht 5' 9" (1 753 m)   Wt (!) 152 kg (335 lb)   LMP 10/10/2018 (Approximate)   BMI 49 47 kg/m²        Physical Exam   Constitutional: She appears well-developed  HENT:   Head: Normocephalic  Eyes: Conjunctivae are normal    Neck: Neck supple  Cardiovascular: Normal rate and intact distal pulses  No murmur heard  Pulmonary/Chest: Effort normal  No respiratory distress  She has no wheezes  Abdominal: Soft  Musculoskeletal: She exhibits no edema or deformity  Neurological: She is alert  Skin: Skin is warm and dry  No rash noted  No pallor  Psychiatric: Her behavior is normal  Thought content normal    Nursing note and vitals reviewed               Wei Vazquez DO

## 2018-12-30 DIAGNOSIS — R60.0 BILATERAL LEG EDEMA: ICD-10-CM

## 2018-12-30 DIAGNOSIS — I44.7 LBBB (LEFT BUNDLE BRANCH BLOCK): ICD-10-CM

## 2018-12-31 DIAGNOSIS — R60.0 BILATERAL LEG EDEMA: ICD-10-CM

## 2018-12-31 DIAGNOSIS — I44.7 LBBB (LEFT BUNDLE BRANCH BLOCK): ICD-10-CM

## 2018-12-31 PROCEDURE — 4010F ACE/ARB THERAPY RXD/TAKEN: CPT | Performed by: FAMILY MEDICINE

## 2018-12-31 RX ORDER — CARVEDILOL 6.25 MG/1
6.25 TABLET ORAL 2 TIMES DAILY WITH MEALS
Qty: 180 TABLET | Refills: 1 | Status: SHIPPED | OUTPATIENT
Start: 2018-12-31 | End: 2019-09-10 | Stop reason: SDUPTHER

## 2018-12-31 RX ORDER — LOSARTAN POTASSIUM 25 MG/1
TABLET ORAL
Qty: 90 TABLET | Refills: 0 | Status: SHIPPED | OUTPATIENT
Start: 2018-12-31 | End: 2019-04-26 | Stop reason: SDUPTHER

## 2019-02-04 ENCOUNTER — OFFICE VISIT (OUTPATIENT)
Dept: FAMILY MEDICINE CLINIC | Facility: CLINIC | Age: 54
End: 2019-02-04
Payer: COMMERCIAL

## 2019-02-04 VITALS
RESPIRATION RATE: 18 BRPM | HEART RATE: 80 BPM | HEIGHT: 69 IN | SYSTOLIC BLOOD PRESSURE: 118 MMHG | BODY MASS INDEX: 43.4 KG/M2 | DIASTOLIC BLOOD PRESSURE: 70 MMHG | WEIGHT: 293 LBS | TEMPERATURE: 99.1 F

## 2019-02-04 DIAGNOSIS — I10 BENIGN ESSENTIAL HYPERTENSION: ICD-10-CM

## 2019-02-04 DIAGNOSIS — E66.01 MORBID OBESITY (HCC): Primary | ICD-10-CM

## 2019-02-04 DIAGNOSIS — IMO0002 DIABETES MELLITUS WITH RENAL MANIFESTATIONS, UNCONTROLLED: ICD-10-CM

## 2019-02-04 DIAGNOSIS — I50.21 ACUTE SYSTOLIC CHF (CONGESTIVE HEART FAILURE) (HCC): ICD-10-CM

## 2019-02-04 DIAGNOSIS — I50.1 HEART FAILURE, LEFT, WITH LVEF 41-49% (HCC): ICD-10-CM

## 2019-02-04 DIAGNOSIS — E11.8 TYPE 2 DIABETES MELLITUS WITH COMPLICATION, WITHOUT LONG-TERM CURRENT USE OF INSULIN (HCC): ICD-10-CM

## 2019-02-04 PROCEDURE — 99214 OFFICE O/P EST MOD 30 MIN: CPT | Performed by: FAMILY MEDICINE

## 2019-02-04 NOTE — LETTER
February 4, 2019     Patient: Ardeen Kanner   YOB: 1965   Date of Visit: 2/4/2019       To Whom it May Concern:    Ardeen Kanner is under my professional care  She was seen in my office on 2/4/2019  Excuse from work today  If you have any questions or concerns, please don't hesitate to call           Sincerely,          Claudio Escobedo DO        CC: No Recipients

## 2019-02-04 NOTE — PROGRESS NOTES
Assessment/Plan:    No problem-specific Assessment & Plan notes found for this encounter  If a1c over 7 5, I will offer over phone DDP4, SGLPT or glimepiride pending coverage vs victoza or ozempic or trulicity    Cont metformin  Sulfonylurea risks advised  Cont smbg  htn stable  bmi aware  chf stable  BMI Counseling: Body mass index is 49 18 kg/m²  Discussed the patient's BMI with her  The BMI is above average  BMI counseling and education was provided to the patient  Nutrition recommendations include decreasing overall calorie intake  Exercise recommendations include exercising 3-5 times per week  Diagnoses and all orders for this visit:    Morbid obesity ([facility] )    Type 2 diabetes mellitus with complication, without long-term current use of insulin ([facility])    Benign essential hypertension    Diabetes mellitus with renal manifestations, uncontrolled ([facility] )    Acute systolic CHF (congestive heart failure) ([facility])    Heart failure, left, with LVEF 41-49% ([facility])    BMI 45 0-49 9, adult ([facility] )              Return in about 3 months (around 5/4/2019) for Recheck  Subjective:      Patient ID: Sadia Hernandez is a 48 y o  female  Chief Complaint   Patient presents with    Follow-up      pt present for follow up on DM  af/rma        HPI  3 meals/d  smbg reviewed  Watches carbs but not counting calories  Exercise 0  Risks of meds aware  Did not get labs done yet  Stress issues at home, son just removed from bad group home due to "fight clubs", he is now with his father    The following portions of the patient's history were reviewed and updated as appropriate: allergies, current medications, past family history, past medical history, past social history, past surgical history and problem list     Review of Systems   Respiratory: Negative for shortness of breath  Cardiovascular: Negative for chest pain  Neurological: Negative for syncope           Current Outpatient Prescriptions   Medication Sig Dispense Refill    albuterol (PROAIR HFA) 90 mcg/act inhaler Inhale 1-2 puffs      Blood Glucose Monitoring Suppl ([employer] SYSTEM) w/Device KIT by Does not apply route daily [E11 8] 1 kit 0    carvedilol (COREG) 6 25 mg tablet Take 1 tablet (6 25 mg total) by mouth 2 (two) times a day with meals Morning and evening 180 tablet 1    glucose blood (ONETOUCH VERIO) test strip 1 each by Other route daily Use as instructed [E11 8] 100 each 5    losartan (COZAAR) 25 mg tablet TAKE 1 TABLET BY MOUTH DAILY 90 tablet 0    metFORMIN (GLUCOPHAGE-XR) 500 mg 24 hr tablet Take 2 tablets (1,000 mg total) by mouth daily As directed 180 tablet 1    spironolactone (ALDACTONE) 25 mg tablet Take 1 tablet (25 mg total) by mouth daily 90 tablet 3    torsemide (DEMADEX) 20 mg tablet Take 1 tablet (20 mg total) by mouth every other day (Patient taking differently: Take 20 mg by mouth as needed  ) 45 tablet 1     No current facility-administered medications for this visit  Objective:    /70   Pulse 80   Temp 99 1 °F (37 3 °C)   Resp 18   Ht 5' 9" (1 753 m)   Wt (!) 151 kg (333 lb)   BMI 49 18 kg/m²        Physical Exam   Constitutional: She appears well-developed  No distress  HENT:   Head: Normocephalic  Mouth/Throat: No oropharyngeal exudate  Eyes: Conjunctivae are normal  No scleral icterus  Neck: Neck supple  No tracheal deviation present  Cardiovascular: Normal rate and intact distal pulses  No murmur heard  Pulmonary/Chest: Effort normal  No respiratory distress  She has no wheezes  Abdominal: Soft  There is no tenderness  Musculoskeletal: She exhibits no edema or deformity  Neurological: She is alert  She exhibits normal muscle tone  Skin: Skin is warm and dry  No rash noted  No pallor  Psychiatric: Her behavior is normal  Thought content normal    Nursing note and vitals reviewed               Dotty Sow DO

## 2019-02-10 LAB
ALBUMIN SERPL-MCNC: 4 G/DL (ref 3.5–5.5)
ALBUMIN/GLOB SERPL: 1.2 {RATIO} (ref 1.2–2.2)
ALP SERPL-CCNC: 72 IU/L (ref 39–117)
ALT SERPL-CCNC: 11 IU/L (ref 0–32)
AST SERPL-CCNC: 11 IU/L (ref 0–40)
BILIRUB SERPL-MCNC: 0.5 MG/DL (ref 0–1.2)
BUN SERPL-MCNC: 15 MG/DL (ref 6–24)
BUN/CREAT SERPL: 21 (ref 9–23)
CALCIUM SERPL-MCNC: 9.2 MG/DL (ref 8.7–10.2)
CHLORIDE SERPL-SCNC: 100 MMOL/L (ref 96–106)
CO2 SERPL-SCNC: 24 MMOL/L (ref 20–29)
CREAT SERPL-MCNC: 0.72 MG/DL (ref 0.57–1)
GLOBULIN SER-MCNC: 3.3 G/DL (ref 1.5–4.5)
GLUCOSE SERPL-MCNC: 186 MG/DL (ref 65–99)
HBA1C MFR BLD: 7.6 % (ref 4.8–5.6)
LABCORP COMMENT: NORMAL
POTASSIUM SERPL-SCNC: 4.6 MMOL/L (ref 3.5–5.2)
PROT SERPL-MCNC: 7.3 G/DL (ref 6–8.5)
SL AMB EGFR AFRICAN AMERICAN: 112 ML/MIN/1.73
SL AMB EGFR NON AFRICAN AMERICAN: 97 ML/MIN/1.73
SODIUM SERPL-SCNC: 139 MMOL/L (ref 134–144)

## 2019-02-27 ENCOUNTER — OFFICE VISIT (OUTPATIENT)
Dept: FAMILY MEDICINE CLINIC | Facility: CLINIC | Age: 54
End: 2019-02-27
Payer: COMMERCIAL

## 2019-02-27 VITALS
HEIGHT: 69 IN | DIASTOLIC BLOOD PRESSURE: 78 MMHG | WEIGHT: 293 LBS | RESPIRATION RATE: 18 BRPM | BODY MASS INDEX: 43.4 KG/M2 | HEART RATE: 76 BPM | SYSTOLIC BLOOD PRESSURE: 134 MMHG | TEMPERATURE: 98.2 F

## 2019-02-27 DIAGNOSIS — I50.1 HEART FAILURE, LEFT, WITH LVEF 41-49% (HCC): ICD-10-CM

## 2019-02-27 DIAGNOSIS — L73.9 FOLLICULITIS: ICD-10-CM

## 2019-02-27 DIAGNOSIS — E11.8 TYPE 2 DIABETES MELLITUS WITH COMPLICATION, WITHOUT LONG-TERM CURRENT USE OF INSULIN (HCC): ICD-10-CM

## 2019-02-27 DIAGNOSIS — N18.1 CKD STAGE 1 DUE TO TYPE 2 DIABETES MELLITUS (HCC): ICD-10-CM

## 2019-02-27 DIAGNOSIS — I10 BENIGN ESSENTIAL HYPERTENSION: ICD-10-CM

## 2019-02-27 DIAGNOSIS — J01.00 ACUTE MAXILLARY SINUSITIS, RECURRENCE NOT SPECIFIED: Primary | ICD-10-CM

## 2019-02-27 DIAGNOSIS — E11.22 CKD STAGE 1 DUE TO TYPE 2 DIABETES MELLITUS (HCC): ICD-10-CM

## 2019-02-27 DIAGNOSIS — E66.01 MORBID OBESITY (HCC): ICD-10-CM

## 2019-02-27 PROCEDURE — 3075F SYST BP GE 130 - 139MM HG: CPT | Performed by: FAMILY MEDICINE

## 2019-02-27 PROCEDURE — 3008F BODY MASS INDEX DOCD: CPT | Performed by: FAMILY MEDICINE

## 2019-02-27 PROCEDURE — 99214 OFFICE O/P EST MOD 30 MIN: CPT | Performed by: FAMILY MEDICINE

## 2019-02-27 PROCEDURE — 1036F TOBACCO NON-USER: CPT | Performed by: FAMILY MEDICINE

## 2019-02-27 PROCEDURE — 3078F DIAST BP <80 MM HG: CPT | Performed by: FAMILY MEDICINE

## 2019-02-27 RX ORDER — METFORMIN HYDROCHLORIDE 500 MG/1
2000 TABLET, EXTENDED RELEASE ORAL DAILY
Qty: 360 TABLET | Refills: 1 | Status: SHIPPED | OUTPATIENT
Start: 2019-02-27 | End: 2019-05-20 | Stop reason: SDUPTHER

## 2019-02-27 RX ORDER — CEFDINIR 300 MG/1
600 CAPSULE ORAL DAILY
Qty: 20 CAPSULE | Refills: 0 | Status: SHIPPED | OUTPATIENT
Start: 2019-02-27 | End: 2019-03-09

## 2019-02-27 RX ORDER — PERMETHRIN 50 MG/G
CREAM TOPICAL
Qty: 60 G | Refills: 1 | Status: SHIPPED | OUTPATIENT
Start: 2019-02-27 | End: 2019-05-19

## 2019-02-27 NOTE — PROGRESS NOTES
Assessment/Plan:    No problem-specific Assessment & Plan notes found for this encounter  DM not at goal, agreeable to metformin increase and diet efforts  Uri/sinusitis-abx and mucinex DM  Rash/excoriations-abx coverage, elimite trial if no better r/o scabies  htn stable  ckd stable     Diagnoses and all orders for this visit:    Acute maxillary sinusitis, recurrence not specified  -     cefdinir (OMNICEF) 300 mg capsule; Take 2 capsules (600 mg total) by mouth daily for 10 days    Folliculitis  -     cefdinir (OMNICEF) 300 mg capsule; Take 2 capsules (600 mg total) by mouth daily for 10 days  -     permethrin (ELIMITE) 5 % cream; Apply to entire body surface, rinse off after 10 hours, repeat in 2 weeks if necessary    Type 2 diabetes mellitus with complication, without long-term current use of insulin (HCC)  -     metFORMIN (GLUCOPHAGE-XR) 500 mg 24 hr tablet; Take 4 tablets (2,000 mg total) by mouth daily As directed  -     Comprehensive metabolic panel; Future  -     Hemoglobin A1C; Future    Heart failure, left, with LVEF 41-49% ([facility])    Benign essential hypertension    Morbid obesity ([facility])    CKD stage 1 due to type 2 diabetes mellitus ([facility] )              No follow-ups on file  Subjective:      Patient ID: Jana Gutierrez is a 48 y o  female      Chief Complaint   Patient presents with    chest congestion    Sore Throat    Cough    Fever     bchurch lpn    Rash       HPI  Congestion  Coughing  Chest congestion  Sore throat  No mucus  Some otc  Feverish  No c/d/n/v  About 3 days  Sick contacts at work    a1c reviewed  Plans more TLC    Itchy, excoriations, no bugs seen, all over back, 1m, no travel, no bed bugs seen, no infestations in pets, BF has 2 spots but not sure if same  The following portions of the patient's history were reviewed and updated as appropriate: allergies, current medications, past family history, past medical history, past social history, past surgical history and problem list     Review of Systems   Respiratory: Positive for cough  Negative for shortness of breath  Cardiovascular: Negative for chest pain  Current Outpatient Medications   Medication Sig Dispense Refill    albuterol (PROAIR HFA) 90 mcg/act inhaler Inhale 1-2 puffs      Blood Glucose Monitoring Suppl ([employer] SYSTEM) w/Device KIT by Does not apply route daily [E11 8] 1 kit 0    carvedilol (COREG) 6 25 mg tablet Take 1 tablet (6 25 mg total) by mouth 2 (two) times a day with meals Morning and evening 180 tablet 1    glucose blood (ONETOUCH VERIO) test strip 1 each by Other route daily Use as instructed [E11 8] 100 each 5    losartan (COZAAR) 25 mg tablet TAKE 1 TABLET BY MOUTH DAILY 90 tablet 0    metFORMIN (GLUCOPHAGE-XR) 500 mg 24 hr tablet Take 4 tablets (2,000 mg total) by mouth daily As directed 360 tablet 1    spironolactone (ALDACTONE) 25 mg tablet Take 1 tablet (25 mg total) by mouth daily 90 tablet 3    torsemide (DEMADEX) 20 mg tablet Take 1 tablet (20 mg total) by mouth every other day (Patient taking differently: Take 20 mg by mouth as needed  ) 45 tablet 1    cefdinir (OMNICEF) 300 mg capsule Take 2 capsules (600 mg total) by mouth daily for 10 days 20 capsule 0    permethrin (ELIMITE) 5 % cream Apply to entire body surface, rinse off after 10 hours, repeat in 2 weeks if necessary 60 g 1     No current facility-administered medications for this visit  Objective:    /78   Pulse 76   Temp 98 2 °F (36 8 °C)   Resp 18   Ht 5' 9" (1 753 m)   Wt (!) 155 kg (341 lb 3 2 oz)   BMI 50 39 kg/m²        Physical Exam   Constitutional: She appears well-developed  No distress  HENT:   Head: Normocephalic  Right Ear: Tympanic membrane normal    Left Ear: Tympanic membrane normal    Mouth/Throat: Uvula is midline, oropharynx is clear and moist and mucous membranes are normal  No uvula swelling  Eyes: Conjunctivae are normal    Neck: Neck supple     Cardiovascular: Normal rate and intact distal pulses  No murmur heard  Pulmonary/Chest: Effort normal  No respiratory distress  She has no rhonchi  Abdominal: Soft  There is no tenderness  Musculoskeletal: She exhibits no edema or deformity  Neurological: She is alert  Skin: Skin is warm and dry  Capillary refill takes less than 2 seconds  Rash noted  No pallor  Psychiatric: Her behavior is normal  Thought content normal    Nursing note and vitals reviewed        Excoriated rash b/l arms and papules on upper back  Sinuses tender to percussion, nasal turbinates visualized and appear red and swollen         Paz Thao, DO

## 2019-02-27 NOTE — LETTER
February 27, 2019     Patient: Eller Severance   YOB: 1965   Date of Visit: 2/27/2019       To Whom it May Concern:    Eller Severance is under my professional care  She was seen in my office on 2/27/2019  If you have any questions or concerns, please don't hesitate to call           Sincerely,          Nichole Hui, DO        CC: No Recipients

## 2019-03-21 NOTE — RESULT NOTES
Verified Results  * XR SACRUM AND COCCYX [id number] 07:03AM [employer] Order Number: [id number]     Test Name Result Flag Reference   XR SACRUM AND COCCYX (Report)     SACRUM AND COCCYX     INDICATION: Sacral/coccygeal pain  Fell on ice  COMPARISON: None     VIEWS: 3     IMAGES: 4      FINDINGS:     There is no acute evidence of fracture  Sacral arcuate lines are maintained  The SI joints appear symmetric  Pubic symphysis maintained  IMPRESSION:     No fracture  Workstation performed: [id number]     Signed by:   Tammy Quintero MD   1/11/18     * XR SPINE LUMBAR 2 OR 3 VIEWS INJURY [id number] 07:03AM [employer] Order Number: [id number]     Test Name Result Flag Reference   XR SPINE LUMBAR 2 OR 3 VIEWS (Report)     LUMBAR SPINE     INDICATION: Back pain  Fell on ice  COMPARISON: 12/21/2011     VIEWS: AP, lateral and coned-down projections     IMAGES: 3     FINDINGS:     Alignment is unremarkable  There is no radiographic evidence of acute fracture or destructive osseous lesion  Mild spurring throughout the lumbar spine  Visualized soft tissues appear unremarkable  IMPRESSION:     Degenerative changes  No fractures         Workstation performed: [id number]     Signed by:   Tammy Quintero MD   1/11/18       Signatures   Electronically signed by : Aiden Tavarez; Jan 11 2018  3:06PM EST                       (Author)
No

## 2019-03-27 ENCOUNTER — TELEPHONE (OUTPATIENT)
Dept: FAMILY MEDICINE CLINIC | Facility: CLINIC | Age: 54
End: 2019-03-27

## 2019-03-27 NOTE — TELEPHONE ENCOUNTER
DR Bernabe Gimenez   Patient said most diabetic medications is covered by insurance  Please call in whatever you feel is best   [facility]

## 2019-04-26 DIAGNOSIS — I44.7 LBBB (LEFT BUNDLE BRANCH BLOCK): ICD-10-CM

## 2019-04-26 DIAGNOSIS — R60.0 BILATERAL LEG EDEMA: ICD-10-CM

## 2019-04-26 PROCEDURE — 4010F ACE/ARB THERAPY RXD/TAKEN: CPT | Performed by: FAMILY MEDICINE

## 2019-04-26 RX ORDER — LOSARTAN POTASSIUM 25 MG/1
TABLET ORAL
Qty: 90 TABLET | Refills: 0 | Status: SHIPPED | OUTPATIENT
Start: 2019-04-26 | End: 2019-08-01 | Stop reason: SDUPTHER

## 2019-05-19 PROBLEM — L73.9 FOLLICULITIS: Status: RESOLVED | Noted: 2019-02-27 | Resolved: 2019-05-19

## 2019-05-19 PROBLEM — G47.33 MODERATE OBSTRUCTIVE SLEEP APNEA: Status: RESOLVED | Noted: 2017-09-28 | Resolved: 2019-05-19

## 2019-05-19 PROBLEM — J01.00 ACUTE MAXILLARY SINUSITIS: Status: RESOLVED | Noted: 2019-02-27 | Resolved: 2019-05-19

## 2019-05-19 PROBLEM — M77.02 MEDIAL EPICONDYLITIS OF ELBOW, LEFT: Status: RESOLVED | Noted: 2018-11-02 | Resolved: 2019-05-19

## 2019-05-19 LAB
ALBUMIN SERPL-MCNC: 4.3 G/DL (ref 3.5–5.5)
ALBUMIN/GLOB SERPL: 1.4 {RATIO} (ref 1.2–2.2)
ALP SERPL-CCNC: 74 IU/L (ref 39–117)
ALT SERPL-CCNC: 25 IU/L (ref 0–32)
AST SERPL-CCNC: 23 IU/L (ref 0–40)
BILIRUB SERPL-MCNC: 0.6 MG/DL (ref 0–1.2)
BUN SERPL-MCNC: 14 MG/DL (ref 6–24)
BUN/CREAT SERPL: 18 (ref 9–23)
CALCIUM SERPL-MCNC: 9.2 MG/DL (ref 8.7–10.2)
CHLORIDE SERPL-SCNC: 98 MMOL/L (ref 96–106)
CO2 SERPL-SCNC: 24 MMOL/L (ref 20–29)
CREAT SERPL-MCNC: 0.76 MG/DL (ref 0.57–1)
GLOBULIN SER-MCNC: 3.1 G/DL (ref 1.5–4.5)
GLUCOSE SERPL-MCNC: 173 MG/DL (ref 65–99)
HBA1C MFR BLD: 8 % (ref 4.8–5.6)
LABCORP COMMENT: NORMAL
POTASSIUM SERPL-SCNC: 4.4 MMOL/L (ref 3.5–5.2)
PROT SERPL-MCNC: 7.4 G/DL (ref 6–8.5)
SL AMB EGFR AFRICAN AMERICAN: 105 ML/MIN/1.73
SL AMB EGFR NON AFRICAN AMERICAN: 91 ML/MIN/1.73
SODIUM SERPL-SCNC: 137 MMOL/L (ref 134–144)

## 2019-05-20 ENCOUNTER — OFFICE VISIT (OUTPATIENT)
Dept: FAMILY MEDICINE CLINIC | Facility: CLINIC | Age: 54
End: 2019-05-20
Payer: COMMERCIAL

## 2019-05-20 VITALS
WEIGHT: 293 LBS | BODY MASS INDEX: 43.4 KG/M2 | HEIGHT: 69 IN | SYSTOLIC BLOOD PRESSURE: 112 MMHG | HEART RATE: 72 BPM | TEMPERATURE: 97.6 F | RESPIRATION RATE: 18 BRPM | DIASTOLIC BLOOD PRESSURE: 66 MMHG

## 2019-05-20 DIAGNOSIS — Z13.89 SCREENING FOR CARDIOVASCULAR, RESPIRATORY, AND GENITOURINARY DISEASES: ICD-10-CM

## 2019-05-20 DIAGNOSIS — I10 BENIGN ESSENTIAL HYPERTENSION: Primary | ICD-10-CM

## 2019-05-20 DIAGNOSIS — N18.1 CKD STAGE 1 DUE TO TYPE 2 DIABETES MELLITUS (HCC): ICD-10-CM

## 2019-05-20 DIAGNOSIS — E66.01 MORBID OBESITY (HCC): ICD-10-CM

## 2019-05-20 DIAGNOSIS — Z13.83 SCREENING FOR CARDIOVASCULAR, RESPIRATORY, AND GENITOURINARY DISEASES: ICD-10-CM

## 2019-05-20 DIAGNOSIS — Z13.6 SCREENING FOR CARDIOVASCULAR, RESPIRATORY, AND GENITOURINARY DISEASES: ICD-10-CM

## 2019-05-20 DIAGNOSIS — I50.1 HEART FAILURE, LEFT, WITH LVEF 41-49% (HCC): ICD-10-CM

## 2019-05-20 DIAGNOSIS — E11.22 CKD STAGE 1 DUE TO TYPE 2 DIABETES MELLITUS (HCC): ICD-10-CM

## 2019-05-20 DIAGNOSIS — E11.8 TYPE 2 DIABETES MELLITUS WITH COMPLICATION, WITHOUT LONG-TERM CURRENT USE OF INSULIN (HCC): ICD-10-CM

## 2019-05-20 PROCEDURE — 1036F TOBACCO NON-USER: CPT | Performed by: FAMILY MEDICINE

## 2019-05-20 PROCEDURE — 3008F BODY MASS INDEX DOCD: CPT | Performed by: FAMILY MEDICINE

## 2019-05-20 PROCEDURE — 3074F SYST BP LT 130 MM HG: CPT | Performed by: FAMILY MEDICINE

## 2019-05-20 PROCEDURE — 99214 OFFICE O/P EST MOD 30 MIN: CPT | Performed by: FAMILY MEDICINE

## 2019-05-20 PROCEDURE — 3078F DIAST BP <80 MM HG: CPT | Performed by: FAMILY MEDICINE

## 2019-05-20 RX ORDER — METFORMIN HYDROCHLORIDE 500 MG/1
500 TABLET, EXTENDED RELEASE ORAL 2 TIMES DAILY WITH MEALS
Qty: 60 TABLET | Refills: 5 | Status: SHIPPED | OUTPATIENT
Start: 2019-05-20 | End: 2019-08-02 | Stop reason: SDUPTHER

## 2019-06-03 DIAGNOSIS — R60.0 BILATERAL LEG EDEMA: ICD-10-CM

## 2019-06-03 DIAGNOSIS — I44.7 LBBB (LEFT BUNDLE BRANCH BLOCK): ICD-10-CM

## 2019-06-04 RX ORDER — SPIRONOLACTONE 25 MG/1
25 TABLET ORAL DAILY
Qty: 90 TABLET | Refills: 3 | Status: SHIPPED | OUTPATIENT
Start: 2019-06-04 | End: 2020-03-23 | Stop reason: SDUPTHER

## 2019-06-23 DIAGNOSIS — I44.7 LBBB (LEFT BUNDLE BRANCH BLOCK): ICD-10-CM

## 2019-06-23 DIAGNOSIS — R60.0 BILATERAL LEG EDEMA: ICD-10-CM

## 2019-07-09 ENCOUNTER — TRANSCRIBE ORDERS (OUTPATIENT)
Dept: ADMINISTRATIVE | Facility: HOSPITAL | Age: 54
End: 2019-07-09

## 2019-07-09 DIAGNOSIS — Z12.39 BREAST SCREENING, UNSPECIFIED: Primary | ICD-10-CM

## 2019-07-12 ENCOUNTER — OFFICE VISIT (OUTPATIENT)
Dept: FAMILY MEDICINE CLINIC | Facility: CLINIC | Age: 54
End: 2019-07-12
Payer: COMMERCIAL

## 2019-07-12 VITALS
TEMPERATURE: 98.4 F | DIASTOLIC BLOOD PRESSURE: 80 MMHG | HEIGHT: 69 IN | WEIGHT: 293 LBS | SYSTOLIC BLOOD PRESSURE: 120 MMHG | RESPIRATION RATE: 18 BRPM | BODY MASS INDEX: 43.4 KG/M2 | HEART RATE: 82 BPM

## 2019-07-12 DIAGNOSIS — J01.00 ACUTE NON-RECURRENT MAXILLARY SINUSITIS: Primary | ICD-10-CM

## 2019-07-12 PROCEDURE — 3008F BODY MASS INDEX DOCD: CPT | Performed by: NURSE PRACTITIONER

## 2019-07-12 PROCEDURE — 99213 OFFICE O/P EST LOW 20 MIN: CPT | Performed by: NURSE PRACTITIONER

## 2019-07-12 RX ORDER — CEFDINIR 300 MG/1
300 CAPSULE ORAL EVERY 12 HOURS SCHEDULED
Qty: 20 CAPSULE | Refills: 0 | Status: SHIPPED | OUTPATIENT
Start: 2019-07-12 | End: 2022-01-10 | Stop reason: SDUPTHER

## 2019-07-12 RX ORDER — LOSARTAN POTASSIUM 25 MG/1
TABLET ORAL
Qty: 90 TABLET | Refills: 0 | Status: SHIPPED | OUTPATIENT
Start: 2019-07-12 | End: 2019-07-12 | Stop reason: SDUPTHER

## 2019-07-12 NOTE — PROGRESS NOTES
Assessment/Plan:    Take antibiotics until finished  Reviewed supportive care  Continue Flonase and antihistamines  RTO prn  Problem List Items Addressed This Visit     None      Visit Diagnoses     Acute non-recurrent maxillary sinusitis    -  Primary          There are no Patient Instructions on file for this visit  Return if symptoms worsen or fail to improve  Subjective:      Patient ID: Angelica Holt is a 48 y o  female  Chief Complaint   Patient presents with    Cough     green phlegm  ac/cma     Sinus pressure     hurting teeth     Nasal Congestion     post-nasal drip     Rash     legs and some on breast, little dots and itchy       C/o sinus congestion, hoarseness, sinus pressure, jaw pain  She has been sick for the past few weeks  Denies fevers  She has a cough that intermittently productive  Denies SOB or wheezing  Has been seeing an allergist, multiple allergies  On allegra, Nasacort, and Zyrtec  The following portions of the patient's history were reviewed and updated as appropriate: allergies, current medications, past family history, past medical history, past social history, past surgical history and problem list     Review of Systems   Constitutional: Positive for fatigue  Negative for chills and fever  HENT: Positive for congestion and sinus pressure  Negative for ear pain, postnasal drip, rhinorrhea and sore throat  Respiratory: Positive for cough  Negative for shortness of breath and wheezing  Cardiovascular: Negative for chest pain  Gastrointestinal: Negative for abdominal pain, diarrhea, nausea and vomiting  Musculoskeletal: Negative for arthralgias  Skin: Negative for rash  Neurological: Negative for headaches           Current Outpatient Medications   Medication Sig Dispense Refill    albuterol (PROAIR HFA) 90 mcg/act inhaler Inhale 1-2 puffs      Blood Glucose Monitoring Suppl ([employer] SYSTEM) w/Device KIT by Does not apply route daily [E11 8] 1 kit 0    carvedilol (COREG) 6 25 mg tablet Take 1 tablet (6 25 mg total) by mouth 2 (two) times a day with meals Morning and evening 180 tablet 1    cetirizine (ZyrTEC) 10 mg tablet Take 1 tablet (10 mg total) by mouth daily for 365 doses 30 tablet 11    fexofenadine (ALLEGRA) 180 MG tablet Take 1 tablet (180 mg total) by mouth daily IN AM 30 tablet 12    glucose blood (ONETOUCH VERIO) test strip 1 each by Other route daily Use as instructed [E11 8] 100 each 5    Linagliptin (TRADJENTA) 5 MG TABS Take 5 mg by mouth daily 30 tablet 5    losartan (COZAAR) 25 mg tablet TAKE 1 TABLET BY MOUTH DAILY 90 tablet 0    metFORMIN (GLUCOPHAGE-XR) 500 mg 24 hr tablet Take 1 tablet (500 mg total) by mouth 2 (two) times a day with meals As directed 60 tablet 5    spironolactone (ALDACTONE) 25 mg tablet Take 1 tablet (25 mg total) by mouth daily 90 tablet 3    torsemide (DEMADEX) 20 mg tablet Take 1 tablet (20 mg total) by mouth every other day (Patient taking differently: Take 20 mg by mouth as needed  ) 45 tablet 1    Triamcinolone Acetonide 55 MCG/ACT AERO 2 Act (110 mcg total) into each nostril daily 60 Act 11     No current facility-administered medications for this visit  Objective:    /80   Pulse 82   Temp 98 4 °F (36 9 °C)   Resp 18   Ht 5' 9" (1 753 m)   Wt (!) 152 kg (334 lb)   BMI 49 32 kg/m²        Physical Exam   Constitutional: She appears well-developed and well-nourished  HENT:   Head: Normocephalic and atraumatic  Right Ear: Tympanic membrane, external ear and ear canal normal    Left Ear: Tympanic membrane, external ear and ear canal normal    Nose: Mucosal edema present  No rhinorrhea  Right sinus exhibits maxillary sinus tenderness  Left sinus exhibits maxillary sinus tenderness  Mouth/Throat: Uvula is midline, oropharynx is clear and moist and mucous membranes are normal    Eyes: Conjunctivae are normal    Neck: Neck supple  No edema present   No thyromegaly present  Cardiovascular: Normal rate, regular rhythm, normal heart sounds and intact distal pulses  No murmur heard  Pulmonary/Chest: Effort normal and breath sounds normal    Abdominal: Bowel sounds are normal  She exhibits no distension  There is no splenomegaly or hepatomegaly  There is no tenderness  Lymphadenopathy:        Right cervical: No superficial cervical adenopathy present  Left cervical: No superficial cervical adenopathy present  Skin: Skin is warm, dry and intact  No rash noted  Psychiatric: She has a normal mood and affect  Nursing note and vitals reviewed               Monisha Hope

## 2019-08-01 DIAGNOSIS — R60.0 BILATERAL LEG EDEMA: ICD-10-CM

## 2019-08-01 DIAGNOSIS — I44.7 LBBB (LEFT BUNDLE BRANCH BLOCK): ICD-10-CM

## 2019-08-02 ENCOUNTER — TELEPHONE (OUTPATIENT)
Dept: FAMILY MEDICINE CLINIC | Facility: CLINIC | Age: 54
End: 2019-08-02

## 2019-08-02 DIAGNOSIS — E11.8 TYPE 2 DIABETES MELLITUS WITH COMPLICATION, WITHOUT LONG-TERM CURRENT USE OF INSULIN (HCC): ICD-10-CM

## 2019-08-02 RX ORDER — METFORMIN HYDROCHLORIDE 500 MG/1
500 TABLET, EXTENDED RELEASE ORAL 2 TIMES DAILY WITH MEALS
Qty: 180 TABLET | Refills: 1 | Status: SHIPPED | OUTPATIENT
Start: 2019-08-02 | End: 2019-11-20 | Stop reason: SDUPTHER

## 2019-08-02 NOTE — TELEPHONE ENCOUNTER
DR BURNS   Patient needs everything [facility], except toursimide  Please advise

## 2019-08-05 PROCEDURE — 4010F ACE/ARB THERAPY RXD/TAKEN: CPT | Performed by: FAMILY MEDICINE

## 2019-08-05 RX ORDER — LOSARTAN POTASSIUM 25 MG/1
TABLET ORAL
Qty: 90 TABLET | Refills: 0 | Status: SHIPPED | OUTPATIENT
Start: 2019-08-05 | End: 2019-11-23 | Stop reason: SDUPTHER

## 2019-08-10 ENCOUNTER — HOSPITAL ENCOUNTER (OUTPATIENT)
Dept: RADIOLOGY | Facility: HOSPITAL | Age: 54
Discharge: HOME | End: 2019-08-10
Attending: FAMILY MEDICINE
Payer: COMMERCIAL

## 2019-08-10 VITALS — HEIGHT: 69 IN | BODY MASS INDEX: 43.4 KG/M2 | WEIGHT: 293 LBS

## 2019-08-10 DIAGNOSIS — Z12.39 BREAST SCREENING, UNSPECIFIED: ICD-10-CM

## 2019-08-10 PROCEDURE — 77063 BREAST TOMOSYNTHESIS BI: CPT

## 2019-08-10 PROCEDURE — 77067 SCR MAMMO BI INCL CAD: CPT

## 2019-08-18 LAB
ALBUMIN SERPL-MCNC: 4.2 G/DL (ref 3.5–5.5)
ALBUMIN/CREAT UR: 7.9 MG/G CREAT (ref 0–30)
ALBUMIN/GLOB SERPL: 1.2 {RATIO} (ref 1.2–2.2)
ALP SERPL-CCNC: 87 IU/L (ref 39–117)
ALT SERPL-CCNC: 15 IU/L (ref 0–32)
AST SERPL-CCNC: 17 IU/L (ref 0–40)
BILIRUB SERPL-MCNC: 0.5 MG/DL (ref 0–1.2)
BUN SERPL-MCNC: 15 MG/DL (ref 6–24)
BUN/CREAT SERPL: 20 (ref 9–23)
CALCIUM SERPL-MCNC: 9.2 MG/DL (ref 8.7–10.2)
CHLORIDE SERPL-SCNC: 99 MMOL/L (ref 96–106)
CHOLEST SERPL-MCNC: 188 MG/DL (ref 100–199)
CO2 SERPL-SCNC: 23 MMOL/L (ref 20–29)
CREAT SERPL-MCNC: 0.76 MG/DL (ref 0.57–1)
CREAT UR-MCNC: 211.6 MG/DL
GLOBULIN SER-MCNC: 3.4 G/DL (ref 1.5–4.5)
GLUCOSE SERPL-MCNC: 150 MG/DL (ref 65–99)
HBA1C MFR BLD: 6.7 % (ref 4.8–5.6)
HDLC SERPL-MCNC: 44 MG/DL
LDLC SERPL CALC-MCNC: 122 MG/DL (ref 0–99)
MICROALBUMIN UR-MCNC: 16.7 UG/ML
MICRODELETION SYND BLD/T FISH: NORMAL
POTASSIUM SERPL-SCNC: 4.8 MMOL/L (ref 3.5–5.2)
PROT SERPL-MCNC: 7.6 G/DL (ref 6–8.5)
SL AMB EGFR AFRICAN AMERICAN: 105 ML/MIN/1.73
SL AMB EGFR NON AFRICAN AMERICAN: 91 ML/MIN/1.73
SODIUM SERPL-SCNC: 138 MMOL/L (ref 134–144)
TRIGL SERPL-MCNC: 110 MG/DL (ref 0–149)

## 2019-08-19 ENCOUNTER — OFFICE VISIT (OUTPATIENT)
Dept: FAMILY MEDICINE CLINIC | Facility: CLINIC | Age: 54
End: 2019-08-19
Payer: COMMERCIAL

## 2019-08-19 VITALS
BODY MASS INDEX: 43.4 KG/M2 | RESPIRATION RATE: 16 BRPM | TEMPERATURE: 99.3 F | DIASTOLIC BLOOD PRESSURE: 70 MMHG | HEIGHT: 69 IN | WEIGHT: 293 LBS | SYSTOLIC BLOOD PRESSURE: 110 MMHG | HEART RATE: 88 BPM

## 2019-08-19 DIAGNOSIS — I50.1 HEART FAILURE, LEFT, WITH LVEF 41-49% (HCC): ICD-10-CM

## 2019-08-19 DIAGNOSIS — E78.49 OTHER HYPERLIPIDEMIA: ICD-10-CM

## 2019-08-19 DIAGNOSIS — E11.22 CKD STAGE 1 DUE TO TYPE 2 DIABETES MELLITUS (HCC): ICD-10-CM

## 2019-08-19 DIAGNOSIS — N18.1 CKD STAGE 1 DUE TO TYPE 2 DIABETES MELLITUS (HCC): ICD-10-CM

## 2019-08-19 DIAGNOSIS — E11.8 TYPE 2 DIABETES MELLITUS WITH COMPLICATION, WITHOUT LONG-TERM CURRENT USE OF INSULIN (HCC): Primary | ICD-10-CM

## 2019-08-19 DIAGNOSIS — G47.33 OSA (OBSTRUCTIVE SLEEP APNEA): ICD-10-CM

## 2019-08-19 DIAGNOSIS — I10 BENIGN ESSENTIAL HYPERTENSION: ICD-10-CM

## 2019-08-19 PROCEDURE — 3074F SYST BP LT 130 MM HG: CPT | Performed by: FAMILY MEDICINE

## 2019-08-19 PROCEDURE — 1036F TOBACCO NON-USER: CPT | Performed by: FAMILY MEDICINE

## 2019-08-19 PROCEDURE — 99214 OFFICE O/P EST MOD 30 MIN: CPT | Performed by: FAMILY MEDICINE

## 2019-08-19 RX ORDER — ATORVASTATIN CALCIUM 10 MG/1
10 TABLET, FILM COATED ORAL DAILY
Qty: 90 TABLET | Refills: 1 | Status: SHIPPED | OUTPATIENT
Start: 2019-08-19 | End: 2020-03-18

## 2019-08-19 NOTE — PROGRESS NOTES
Assessment/Plan:    No problem-specific Assessment & Plan notes found for this encounter  She will look into coverage of Ken  Dm better  bmi aware  htn stable  Cardiomyopathy stable  Start statin  ckd stable  Use of statins for the purposes of CVD/CVA risks reduction was advised according to USPSTF guidelines along with appropriate continued liver monitoring  Diagnoses and all orders for this visit:    Type 2 diabetes mellitus with complication, without long-term current use of insulin ([facility] )  -     Cancel: IRIS Diabetic eye exam  -     Comprehensive metabolic panel; Future  -     Hemoglobin A1C; Future    Heart failure, left, with LVEF 41-49% (HCC)    CKD stage 1 due to type 2 diabetes mellitus (HCC)    Benign essential hypertension    ARMANDO (obstructive sleep apnea)    Other hyperlipidemia  -     atorvastatin (LIPITOR) 10 mg tablet; Take 1 tablet (10 mg total) by mouth daily        Return in about 3 months (around 11/19/2019)  Subjective:      Patient ID: El Aly is a 48 y o  female  Chief Complaint   Patient presents with    Follow-up     3 month f/u-wmcma       HPI  Eating better  Taking meds  Clothes fitting better  No exercise  a1c better  Not much wt change  Lipids reviewed    The following portions of the patient's history were reviewed and updated as appropriate: allergies, current medications, past family history, past medical history, past social history, past surgical history and problem list     Review of Systems   Respiratory: Negative for shortness of breath  Cardiovascular: Negative for chest pain  Gastrointestinal: Negative for abdominal pain           Current Outpatient Medications   Medication Sig Dispense Refill    albuterol (PROAIR HFA) 90 mcg/act inhaler Inhale 1-2 puffs      Blood Glucose Monitoring Suppl ([employer] SYSTEM) w/Device KIT by Does not apply route daily [E11 8] 1 kit 0    carvedilol (COREG) 6 25 mg tablet Take 1 tablet (6 25 mg total) by mouth 2 (two) times a day with meals Morning and evening 180 tablet 1    cetirizine (ZyrTEC) 10 mg tablet Take 1 tablet (10 mg total) by mouth daily for 365 doses 30 tablet 11    fexofenadine (ALLEGRA) 180 MG tablet Take 1 tablet (180 mg total) by mouth daily IN AM 30 tablet 12    glucose blood (ONETOUCH VERIO) test strip 1 each by Other route daily Use as instructed [E11 8] 100 each 5    linaGLIPtin (TRADJENTA) 5 MG TABS Take 5 mg by mouth daily 90 tablet 1    losartan (COZAAR) 25 mg tablet TAKE 1 TABLET BY MOUTH DAILY 90 tablet 0    metFORMIN (GLUCOPHAGE-XR) 500 mg 24 hr tablet Take 1 tablet (500 mg total) by mouth 2 (two) times a day with meals As directed 180 tablet 1    spironolactone (ALDACTONE) 25 mg tablet Take 1 tablet (25 mg total) by mouth daily 90 tablet 3    torsemide (DEMADEX) 20 mg tablet Take 1 tablet (20 mg total) by mouth every other day (Patient taking differently: Take 20 mg by mouth as needed  ) 45 tablet 1    Triamcinolone Acetonide 55 MCG/ACT AERO 2 Act (110 mcg total) into each nostril daily 60 Act 11    atorvastatin (LIPITOR) 10 mg tablet Take 1 tablet (10 mg total) by mouth daily 90 tablet 1     No current facility-administered medications for this visit  Objective:    /70   Pulse 88   Temp 99 3 °F (37 4 °C)   Resp 16   Ht 5' 9" (1 753 m)   Wt (!) 151 kg (333 lb)   LMP 08/08/2019 (Exact Date)   BMI 49 18 kg/m²        Physical Exam   Constitutional: She appears well-developed  No distress  HENT:   Head: Normocephalic  Right Ear: External ear normal    Left Ear: External ear normal    Mouth/Throat: No oropharyngeal exudate  Eyes: Conjunctivae are normal  No scleral icterus  Neck: Neck supple  Cardiovascular: Normal rate, regular rhythm, normal heart sounds and intact distal pulses  No murmur heard  Pulmonary/Chest: Effort normal and breath sounds normal  No respiratory distress  She has no wheezes  Abdominal: Soft   Bowel sounds are normal  She exhibits no distension  Musculoskeletal: She exhibits no edema or deformity  Lymphadenopathy:     She has no cervical adenopathy  Neurological: She is alert  She exhibits normal muscle tone  Skin: Skin is warm and dry  No rash noted  No pallor  Psychiatric: Her behavior is normal  Thought content normal    Nursing note and vitals reviewed               Meir Byrd DO

## 2019-09-10 DIAGNOSIS — I44.7 LBBB (LEFT BUNDLE BRANCH BLOCK): ICD-10-CM

## 2019-09-10 DIAGNOSIS — R60.0 BILATERAL LEG EDEMA: ICD-10-CM

## 2019-09-10 RX ORDER — CARVEDILOL 6.25 MG/1
TABLET ORAL
Qty: 180 TABLET | Refills: 0 | Status: SHIPPED | OUTPATIENT
Start: 2019-09-10 | End: 2020-01-12

## 2019-11-17 LAB
ALBUMIN SERPL-MCNC: 4.3 G/DL (ref 3.5–5.5)
ALBUMIN/GLOB SERPL: 1.3 {RATIO} (ref 1.2–2.2)
ALP SERPL-CCNC: 78 IU/L (ref 39–117)
ALT SERPL-CCNC: 10 IU/L (ref 0–32)
AST SERPL-CCNC: 11 IU/L (ref 0–40)
BILIRUB SERPL-MCNC: 0.7 MG/DL (ref 0–1.2)
BUN SERPL-MCNC: 15 MG/DL (ref 6–24)
BUN/CREAT SERPL: 20 (ref 9–23)
CALCIUM SERPL-MCNC: 9.1 MG/DL (ref 8.7–10.2)
CHLORIDE SERPL-SCNC: 100 MMOL/L (ref 96–106)
CO2 SERPL-SCNC: 24 MMOL/L (ref 20–29)
CREAT SERPL-MCNC: 0.74 MG/DL (ref 0.57–1)
GLOBULIN SER-MCNC: 3.3 G/DL (ref 1.5–4.5)
GLUCOSE SERPL-MCNC: 142 MG/DL (ref 65–99)
HBA1C MFR BLD: 7 % (ref 4.8–5.6)
POTASSIUM SERPL-SCNC: 4.6 MMOL/L (ref 3.5–5.2)
PROT SERPL-MCNC: 7.6 G/DL (ref 6–8.5)
SL AMB EGFR AFRICAN AMERICAN: 108 ML/MIN/1.73
SL AMB EGFR NON AFRICAN AMERICAN: 93 ML/MIN/1.73
SODIUM SERPL-SCNC: 138 MMOL/L (ref 134–144)

## 2019-11-20 ENCOUNTER — OFFICE VISIT (OUTPATIENT)
Dept: FAMILY MEDICINE CLINIC | Facility: CLINIC | Age: 54
End: 2019-11-20
Payer: COMMERCIAL

## 2019-11-20 VITALS
WEIGHT: 293 LBS | HEART RATE: 80 BPM | RESPIRATION RATE: 18 BRPM | TEMPERATURE: 98.2 F | DIASTOLIC BLOOD PRESSURE: 68 MMHG | HEIGHT: 69 IN | BODY MASS INDEX: 43.4 KG/M2 | SYSTOLIC BLOOD PRESSURE: 112 MMHG

## 2019-11-20 DIAGNOSIS — E78.49 OTHER HYPERLIPIDEMIA: ICD-10-CM

## 2019-11-20 DIAGNOSIS — E11.8 TYPE 2 DIABETES MELLITUS WITH COMPLICATION, WITHOUT LONG-TERM CURRENT USE OF INSULIN (HCC): Primary | ICD-10-CM

## 2019-11-20 DIAGNOSIS — N18.1 CKD STAGE 1 DUE TO TYPE 2 DIABETES MELLITUS (HCC): ICD-10-CM

## 2019-11-20 DIAGNOSIS — Z12.11 COLON CANCER SCREENING: ICD-10-CM

## 2019-11-20 DIAGNOSIS — I10 BENIGN ESSENTIAL HYPERTENSION: ICD-10-CM

## 2019-11-20 DIAGNOSIS — E66.01 MORBID OBESITY (HCC): ICD-10-CM

## 2019-11-20 DIAGNOSIS — E11.22 CKD STAGE 1 DUE TO TYPE 2 DIABETES MELLITUS (HCC): ICD-10-CM

## 2019-11-20 PROCEDURE — 1036F TOBACCO NON-USER: CPT | Performed by: FAMILY MEDICINE

## 2019-11-20 PROCEDURE — 99214 OFFICE O/P EST MOD 30 MIN: CPT | Performed by: FAMILY MEDICINE

## 2019-11-20 RX ORDER — METFORMIN HYDROCHLORIDE 500 MG/1
500 TABLET, EXTENDED RELEASE ORAL 2 TIMES DAILY WITH MEALS
Qty: 180 TABLET | Refills: 1 | Status: SHIPPED | OUTPATIENT
Start: 2019-11-20 | End: 2020-03-02 | Stop reason: SDUPTHER

## 2019-11-20 NOTE — PROGRESS NOTES
Assessment/Plan:    No problem-specific Assessment & Plan notes found for this encounter  Eye doctor at [facility]  DM2 control fair, cont meds and efforts  htn stable  ckd1 stable  Cont statin for risk reduction          Diagnoses and all orders for this visit:    Type 2 diabetes mellitus with complication, without long-term current use of insulin ([facility] )  -     Comprehensive metabolic panel; Future  -     Hemoglobin A1C; Future  -     linaGLIPtin (TRADJENTA) 5 MG TABS; Take 5 mg by mouth daily  -     metFORMIN (GLUCOPHAGE-XR) 500 mg 24 hr tablet; Take 1 tablet (500 mg total) by mouth 2 (two) times a day with meals As directed    Morbid obesity ([facility] )    Benign essential hypertension    CKD stage 1 due to type 2 diabetes mellitus ([facility] )    Colon cancer screening  -     Ambulatory referral to Gastroenterology; Future    Other hyperlipidemia  -     Lipid Panel with Direct LDL reflex; Future        Return in about 3 months (around 2/20/2020) for Recheck  Subjective:      Patient ID: Lisha Dowd is a 48 y o  female  Chief Complaint   Patient presents with    Follow-up     3  month f/u prcma       HPI  Not doing smbg  No time to do it  Diabetic diet - sort of, per pt  Not counting calories or carbs  Not exercising  Has gained wt  Labs reviewed    The following portions of the patient's history were reviewed and updated as appropriate: allergies, current medications, past family history, past medical history, past social history, past surgical history and problem list     Review of Systems   Respiratory: Negative for shortness of breath  Cardiovascular: Negative for chest pain           Current Outpatient Medications   Medication Sig Dispense Refill    albuterol (PROAIR HFA) 90 mcg/act inhaler Inhale 1-2 puffs      atorvastatin (LIPITOR) 10 mg tablet Take 1 tablet (10 mg total) by mouth daily 90 tablet 1    Blood Glucose Monitoring Suppl ([employer] IQ SYSTEM) w/Device KIT by Does not apply route daily [E11 8] 1 kit 0    carvedilol (COREG) 6 25 mg tablet TAKE 1 TABLET BY MOUTH TWICE A DAY(WITH MORNING AND EVENING MEALS) 180 tablet 0    cetirizine (ZyrTEC) 10 mg tablet Take 1 tablet (10 mg total) by mouth daily for 365 doses 30 tablet 11    fexofenadine (ALLEGRA) 180 MG tablet Take 1 tablet (180 mg total) by mouth daily IN AM 30 tablet 12    glucose blood (ONETOUCH VERIO) test strip 1 each by Other route daily Use as instructed [E11 8] 100 each 5    linaGLIPtin (TRADJENTA) 5 MG TABS Take 5 mg by mouth daily 90 tablet 1    losartan (COZAAR) 25 mg tablet TAKE 1 TABLET BY MOUTH DAILY 90 tablet 0    metFORMIN (GLUCOPHAGE-XR) 500 mg 24 hr tablet Take 1 tablet (500 mg total) by mouth 2 (two) times a day with meals As directed 180 tablet 1    spironolactone (ALDACTONE) 25 mg tablet Take 1 tablet (25 mg total) by mouth daily 90 tablet 3    torsemide (DEMADEX) 20 mg tablet Take 1 tablet (20 mg total) by mouth every other day (Patient taking differently: Take 20 mg by mouth as needed  ) 45 tablet 1     No current facility-administered medications for this visit  Objective:    /68   Pulse 80   Temp 98 2 °F (36 8 °C)   Resp 18   Ht 5' 9" (1 753 m)   Wt (!) 154 kg (339 lb)   BMI 50 06 kg/m²        Physical Exam   Constitutional: She appears well-developed  No distress  HENT:   Head: Normocephalic  Right Ear: External ear normal    Left Ear: External ear normal    Mouth/Throat: No oropharyngeal exudate  Eyes: Conjunctivae are normal    Neck: Neck supple  Cardiovascular: Normal rate, regular rhythm and intact distal pulses  Pulmonary/Chest: Effort normal and breath sounds normal  No respiratory distress  Abdominal: Soft  Bowel sounds are normal  There is no tenderness  Musculoskeletal: She exhibits no edema or deformity  Neurological: She is alert  Skin: Skin is warm and dry  No pallor     Psychiatric: Her behavior is normal  Thought content normal    Nursing note and vitals reviewed               Rustam Alvares DO

## 2019-11-23 DIAGNOSIS — I44.7 LBBB (LEFT BUNDLE BRANCH BLOCK): ICD-10-CM

## 2019-11-23 DIAGNOSIS — R60.0 BILATERAL LEG EDEMA: ICD-10-CM

## 2019-11-25 PROCEDURE — 4010F ACE/ARB THERAPY RXD/TAKEN: CPT | Performed by: FAMILY MEDICINE

## 2019-11-25 RX ORDER — SPIRONOLACTONE 25 MG/1
TABLET ORAL
Qty: 90 TABLET | Refills: 0 | Status: SHIPPED | OUTPATIENT
Start: 2019-11-25 | End: 2019-12-11

## 2019-11-25 RX ORDER — LOSARTAN POTASSIUM 25 MG/1
TABLET ORAL
Qty: 90 TABLET | Refills: 0 | Status: SHIPPED | OUTPATIENT
Start: 2019-11-25 | End: 2020-03-23 | Stop reason: SDUPTHER

## 2019-12-11 ENCOUNTER — OFFICE VISIT (OUTPATIENT)
Dept: FAMILY MEDICINE CLINIC | Facility: CLINIC | Age: 54
End: 2019-12-11
Payer: COMMERCIAL

## 2019-12-11 VITALS
SYSTOLIC BLOOD PRESSURE: 108 MMHG | BODY MASS INDEX: 43.4 KG/M2 | DIASTOLIC BLOOD PRESSURE: 70 MMHG | TEMPERATURE: 98.9 F | WEIGHT: 293 LBS | HEIGHT: 69 IN | HEART RATE: 72 BPM | RESPIRATION RATE: 16 BRPM

## 2019-12-11 DIAGNOSIS — E11.8 TYPE 2 DIABETES MELLITUS WITH COMPLICATION, WITHOUT LONG-TERM CURRENT USE OF INSULIN (HCC): ICD-10-CM

## 2019-12-11 DIAGNOSIS — R21 RASH: ICD-10-CM

## 2019-12-11 DIAGNOSIS — J02.9 ACUTE PHARYNGITIS, UNSPECIFIED ETIOLOGY: Primary | ICD-10-CM

## 2019-12-11 DIAGNOSIS — I10 BENIGN ESSENTIAL HYPERTENSION: ICD-10-CM

## 2019-12-11 PROCEDURE — 3074F SYST BP LT 130 MM HG: CPT | Performed by: FAMILY MEDICINE

## 2019-12-11 PROCEDURE — 3008F BODY MASS INDEX DOCD: CPT | Performed by: FAMILY MEDICINE

## 2019-12-11 PROCEDURE — 3078F DIAST BP <80 MM HG: CPT | Performed by: FAMILY MEDICINE

## 2019-12-11 PROCEDURE — 99214 OFFICE O/P EST MOD 30 MIN: CPT | Performed by: FAMILY MEDICINE

## 2019-12-11 PROCEDURE — 1036F TOBACCO NON-USER: CPT | Performed by: FAMILY MEDICINE

## 2019-12-11 RX ORDER — AZITHROMYCIN 250 MG/1
TABLET, FILM COATED ORAL
Qty: 6 TABLET | Refills: 0 | Status: SHIPPED | OUTPATIENT
Start: 2019-12-11 | End: 2019-12-16

## 2019-12-11 NOTE — PROGRESS NOTES
Assessment/Plan:    No problem-specific Assessment & Plan notes found for this encounter  Slightly red right TM  abx due to high risk exposure    Rash right thigh, 10cm, no arthralgias, check lyme    Htn/dm stable/unchanged     Diagnoses and all orders for this visit:    Acute pharyngitis, unspecified etiology  -     azithromycin (ZITHROMAX) 250 mg tablet; Take 500mg on day 1, 250mg on days 2-5    Rash  -     Lyme Antibody Profile with reflex to WB; Future    Benign essential hypertension    Type 2 diabetes mellitus with complication, without long-term current use of insulin ([facility] )              Return for Next scheduled follow up  Subjective:      Patient ID: Mary Ann Stanford is a 48 y o  female  Chief Complaint   Patient presents with    Sinus Problem     for 3 days prcma    Sore Throat    Rash     right upper leg, warm to touch        HPI  Sick for 3d  Sore throat  Son with strep 1w ago  Sneezing  Watery eyes  achey  Feverish  Ears ok  Dry cough  No sob  Working on dm  No otc tried    2m  Rash  Right prox thigh  Not itchy  Never tested pos before  Has dogs  No ticks seen or recalled    Working on DM  htn stable    The following portions of the patient's history were reviewed and updated as appropriate: allergies, current medications, past family history, past medical history, past social history, past surgical history and problem list     Review of Systems   Respiratory: Negative for shortness of breath            Current Outpatient Medications   Medication Sig Dispense Refill    albuterol (PROAIR HFA) 90 mcg/act inhaler Inhale 1-2 puffs      atorvastatin (LIPITOR) 10 mg tablet Take 1 tablet (10 mg total) by mouth daily 90 tablet 1    Blood Glucose Monitoring Suppl ([employer] IQ SYSTEM) w/Device KIT by Does not apply route daily [E11 8] 1 kit 0    carvedilol (COREG) 6 25 mg tablet TAKE 1 TABLET BY MOUTH TWICE A DAY(WITH MORNING AND EVENING MEALS) 180 tablet 0    cetirizine (ZyrTEC) 10 mg tablet Take 1 tablet (10 mg total) by mouth daily for 365 doses 30 tablet 11    fexofenadine (ALLEGRA) 180 MG tablet Take 1 tablet (180 mg total) by mouth daily IN AM 30 tablet 12    glucose blood (ONETOUCH VERIO) test strip 1 each by Other route daily Use as instructed [E11 8] 100 each 5    linaGLIPtin (TRADJENTA) 5 MG TABS Take 5 mg by mouth daily 90 tablet 1    losartan (COZAAR) 25 mg tablet TAKE 1 TABLET BY MOUTH DAILY 90 tablet 0    metFORMIN (GLUCOPHAGE-XR) 500 mg 24 hr tablet Take 1 tablet (500 mg total) by mouth 2 (two) times a day with meals As directed 180 tablet 1    spironolactone (ALDACTONE) 25 mg tablet Take 1 tablet (25 mg total) by mouth daily 90 tablet 3    torsemide (DEMADEX) 20 mg tablet Take 1 tablet (20 mg total) by mouth every other day (Patient taking differently: Take 20 mg by mouth as needed  ) 45 tablet 1    azithromycin (ZITHROMAX) 250 mg tablet Take 500mg on day 1, 250mg on days 2-5 6 tablet 0     No current facility-administered medications for this visit  Objective:    /70   Pulse 72   Temp 98 9 °F (37 2 °C)   Resp 16   Ht 5' 9" (1 753 m)   Wt (!) 152 kg (336 lb)   BMI 49 62 kg/m²        Physical Exam   Constitutional: She appears well-developed  No distress  HENT:   Head: Normocephalic  Right Ear: External ear normal    Left Ear: External ear normal    Mouth/Throat: No oropharyngeal exudate  Sinuses tender to percussion, nasal turbinates visualized and appear red and swollen     Eyes: Conjunctivae are normal  No scleral icterus  Neck: Neck supple  No tracheal deviation present  Cardiovascular: Normal rate, regular rhythm and intact distal pulses  Pulmonary/Chest: Effort normal  No respiratory distress  She has no wheezes  Abdominal: Soft  Bowel sounds are normal    Musculoskeletal: She exhibits no edema or deformity  Neurological: She is alert  Skin: Skin is warm and dry  Rash noted  No pallor     10cm annular right proximal anterior thigh, no scaling, no adenopathy   Psychiatric: Her behavior is normal  Thought content normal    Nursing note and vitals reviewed               Edna Nunez DO

## 2019-12-11 NOTE — PATIENT INSTRUCTIONS
Over-the-counter products can be useful for your symptoms:                      <<GUAIFENESIN>> is an expectorant that is useful for thick mucus  Often found in Robitussin and Mucinex products  <<DEXTROMETHORPHAN>> is a cough suppressant that works in the brain   to help reduce bothersome cough  Use with caution with other medications that work in the brain  <<ANTI-HISTAMINES>> are useful as allergy medications and to help dry up   bothersome thin secretions  Less sedating options include   Claritin, Zyrtec, Allegra and Xyzal and have generic OTC forms  <<PSEUDOEPHEDRINE and PHENYLEPHRINE>> are stimulant decongestants   that can be used for congestion and sinus pressure  Avoid or use with caution with high blood pressure or heart conditions  <<NASAL SPRAYS>> Steroid nasal sprays (flonase, nasacort) are used for allergies but   can be used for congestion also  Safe for high blood pressure  Afrin is a decongestant that works quickly but for up to 3 days

## 2019-12-14 LAB
LEFT EYE DIABETIC RETINOPATHY: NORMAL
RIGHT EYE DIABETIC RETINOPATHY: NORMAL

## 2019-12-16 LAB
B BURGDOR IGG+IGM SER-ACNC: <0.91 ISR (ref 0–0.9)
B BURGDOR IGM SER IA-ACNC: <0.8 INDEX (ref 0–0.79)

## 2020-01-11 DIAGNOSIS — I44.7 LBBB (LEFT BUNDLE BRANCH BLOCK): ICD-10-CM

## 2020-01-11 DIAGNOSIS — R60.0 BILATERAL LEG EDEMA: ICD-10-CM

## 2020-01-12 RX ORDER — CARVEDILOL 6.25 MG/1
TABLET ORAL
Qty: 180 TABLET | Refills: 0 | Status: SHIPPED | OUTPATIENT
Start: 2020-01-12 | End: 2020-04-15 | Stop reason: SDUPTHER

## 2020-02-28 LAB
ALBUMIN SERPL-MCNC: 4.4 G/DL (ref 3.8–4.9)
ALBUMIN/GLOB SERPL: 1.3 {RATIO} (ref 1.2–2.2)
ALP SERPL-CCNC: 90 IU/L (ref 39–117)
ALT SERPL-CCNC: 13 IU/L (ref 0–32)
AST SERPL-CCNC: 14 IU/L (ref 0–40)
BILIRUB SERPL-MCNC: 0.6 MG/DL (ref 0–1.2)
BUN SERPL-MCNC: 16 MG/DL (ref 6–24)
BUN/CREAT SERPL: 21 (ref 9–23)
CALCIUM SERPL-MCNC: 9.4 MG/DL (ref 8.7–10.2)
CHLORIDE SERPL-SCNC: 98 MMOL/L (ref 96–106)
CHOLEST SERPL-MCNC: 147 MG/DL (ref 100–199)
CO2 SERPL-SCNC: 24 MMOL/L (ref 20–29)
CREAT SERPL-MCNC: 0.78 MG/DL (ref 0.57–1)
GLOBULIN SER-MCNC: 3.4 G/DL (ref 1.5–4.5)
GLUCOSE SERPL-MCNC: 169 MG/DL (ref 65–99)
HBA1C MFR BLD: 7.5 % (ref 4.8–5.6)
HDLC SERPL-MCNC: 46 MG/DL
LDLC SERPL CALC-MCNC: 78 MG/DL (ref 0–99)
MICRODELETION SYND BLD/T FISH: NORMAL
POTASSIUM SERPL-SCNC: 4.8 MMOL/L (ref 3.5–5.2)
PROT SERPL-MCNC: 7.8 G/DL (ref 6–8.5)
SL AMB EGFR AFRICAN AMERICAN: 101 ML/MIN/1.73
SL AMB EGFR NON AFRICAN AMERICAN: 88 ML/MIN/1.73
SODIUM SERPL-SCNC: 138 MMOL/L (ref 134–144)
TRIGL SERPL-MCNC: 117 MG/DL (ref 0–149)

## 2020-02-28 PROCEDURE — 3051F HG A1C>EQUAL 7.0%<8.0%: CPT | Performed by: INTERNAL MEDICINE

## 2020-03-02 ENCOUNTER — OFFICE VISIT (OUTPATIENT)
Dept: FAMILY MEDICINE CLINIC | Facility: CLINIC | Age: 55
End: 2020-03-02
Payer: COMMERCIAL

## 2020-03-02 VITALS
SYSTOLIC BLOOD PRESSURE: 110 MMHG | BODY MASS INDEX: 44.41 KG/M2 | WEIGHT: 293 LBS | RESPIRATION RATE: 18 BRPM | TEMPERATURE: 98.8 F | HEIGHT: 68 IN | DIASTOLIC BLOOD PRESSURE: 64 MMHG | OXYGEN SATURATION: 98 % | HEART RATE: 79 BPM

## 2020-03-02 DIAGNOSIS — N18.1 CKD STAGE 1 DUE TO TYPE 2 DIABETES MELLITUS (HCC): ICD-10-CM

## 2020-03-02 DIAGNOSIS — Z23 IMMUNIZATION DUE: ICD-10-CM

## 2020-03-02 DIAGNOSIS — Z12.39 BREAST CANCER SCREENING: ICD-10-CM

## 2020-03-02 DIAGNOSIS — G47.33 OSA (OBSTRUCTIVE SLEEP APNEA): ICD-10-CM

## 2020-03-02 DIAGNOSIS — E66.01 MORBID OBESITY (HCC): ICD-10-CM

## 2020-03-02 DIAGNOSIS — E11.8 TYPE 2 DIABETES MELLITUS WITH COMPLICATION, WITHOUT LONG-TERM CURRENT USE OF INSULIN (HCC): Primary | ICD-10-CM

## 2020-03-02 DIAGNOSIS — I50.1 HEART FAILURE, LEFT, WITH LVEF 41-49% (HCC): ICD-10-CM

## 2020-03-02 DIAGNOSIS — E11.22 CKD STAGE 1 DUE TO TYPE 2 DIABETES MELLITUS (HCC): ICD-10-CM

## 2020-03-02 PROBLEM — J02.9 ACUTE PHARYNGITIS: Status: RESOLVED | Noted: 2019-12-11 | Resolved: 2020-03-02

## 2020-03-02 PROCEDURE — 90715 TDAP VACCINE 7 YRS/> IM: CPT

## 2020-03-02 PROCEDURE — 3078F DIAST BP <80 MM HG: CPT | Performed by: FAMILY MEDICINE

## 2020-03-02 PROCEDURE — 3008F BODY MASS INDEX DOCD: CPT | Performed by: FAMILY MEDICINE

## 2020-03-02 PROCEDURE — 3074F SYST BP LT 130 MM HG: CPT | Performed by: FAMILY MEDICINE

## 2020-03-02 PROCEDURE — 1036F TOBACCO NON-USER: CPT | Performed by: FAMILY MEDICINE

## 2020-03-02 PROCEDURE — 99214 OFFICE O/P EST MOD 30 MIN: CPT | Performed by: FAMILY MEDICINE

## 2020-03-02 PROCEDURE — 3066F NEPHROPATHY DOC TX: CPT | Performed by: INTERNAL MEDICINE

## 2020-03-02 PROCEDURE — 90471 IMMUNIZATION ADMIN: CPT

## 2020-03-02 PROCEDURE — 3066F NEPHROPATHY DOC TX: CPT | Performed by: FAMILY MEDICINE

## 2020-03-02 RX ORDER — METFORMIN HYDROCHLORIDE 500 MG/1
1500 TABLET, EXTENDED RELEASE ORAL
Qty: 270 TABLET | Refills: 1 | Status: SHIPPED | OUTPATIENT
Start: 2020-03-02 | End: 2021-01-05 | Stop reason: SDUPTHER

## 2020-03-02 NOTE — PATIENT INSTRUCTIONS
Nuha King is a good, though not equivalent, alternative to a colonoscopy for low risk individuals  You can contact our office if you would like this ordered for you, after you find out if your insurance company will cover it  A normal result may be valid for 3 years but then the test would be repeated every 3 years

## 2020-03-02 NOTE — PROGRESS NOTES
Assessment/Plan:    No problem-specific Assessment & Plan notes found for this encounter  DM2 not controlled, increase glucophage 1000mg/d to 1500mg/d  TLC and wt loss advised  ARMANDO unchanged, urged f/u with sleep medicine for other options  Left sided heart failure, cont meds and cardio f/u  ckd1 stable  mammo for April ordered     Diagnoses and all orders for this visit:    Type 2 diabetes mellitus with complication, without long-term current use of insulin ([facility] )  -     Comprehensive metabolic panel; Future  -     Hemoglobin A1C; Future  -     metFORMIN (GLUCOPHAGE-XR) 500 mg 24 hr tablet; Take 3 tablets (1,500 mg total) by mouth daily with breakfast As directed    Heart failure, left, with LVEF 41-49% (HCC)    ARMANDO (obstructive sleep apnea)    CKD stage 1 due to type 2 diabetes mellitus ([facility] )    Breast cancer screening  -     Mammo screening bilateral w cad; Future    Immunization due  -     TDAP VACCINE GREATER THAN OR EQUAL TO 8YO IM    Morbid obesity ([facility] )  -     TSH, 3rd generation; Future              Return in about 3 months (around 6/2/2020) for Recheck  Subjective:      Patient ID: Mary Ann Stanford is a 48 y o  female  Chief Complaint   Patient presents with    Follow-up     follow up on diabetes jlopezcma        HPI  Taking all meds  Hard to follow diet  No exercising  Plans to start  Not been using cpap, intolerant per pt and returned it  No period in [place] is Dr Linwood Maya done at [facility] on [address]    The following portions of the patient's history were reviewed and updated as appropriate: allergies, current medications, past family history, past medical history, past social history, past surgical history and problem list     Review of Systems   Respiratory: Negative for shortness of breath  Cardiovascular: Negative for chest pain           Current Outpatient Medications   Medication Sig Dispense Refill    albuterol (PROAIR HFA) 90 mcg/act inhaler Inhale 1-2 puffs      atorvastatin (LIPITOR) 10 mg tablet Take 1 tablet (10 mg total) by mouth daily 90 tablet 1    Blood Glucose Monitoring Suppl ([employer] SYSTEM) w/Device KIT by Does not apply route daily [E11 8] 1 kit 0    carvedilol (COREG) 6 25 mg tablet TAKE 1 TABLET BY MOUTH TWICE A DAY(WITH MORNING AND EVENING MEALS) 180 tablet 0    cetirizine (ZyrTEC) 10 mg tablet Take 1 tablet (10 mg total) by mouth daily for 365 doses 30 tablet 11    fexofenadine (ALLEGRA) 180 MG tablet Take 1 tablet (180 mg total) by mouth daily IN AM 30 tablet 12    glucose blood (ONETOUCH VERIO) test strip 1 each by Other route daily Use as instructed [E11 8] 100 each 5    linaGLIPtin (TRADJENTA) 5 MG TABS Take 5 mg by mouth daily 90 tablet 1    losartan (COZAAR) 25 mg tablet TAKE 1 TABLET BY MOUTH DAILY 90 tablet 0    metFORMIN (GLUCOPHAGE-XR) 500 mg 24 hr tablet Take 3 tablets (1,500 mg total) by mouth daily with breakfast As directed 270 tablet 1    spironolactone (ALDACTONE) 25 mg tablet Take 1 tablet (25 mg total) by mouth daily 90 tablet 3    torsemide (DEMADEX) 20 mg tablet Take 1 tablet (20 mg total) by mouth every other day (Patient taking differently: Take 20 mg by mouth as needed  ) 45 tablet 1     No current facility-administered medications for this visit  Objective:    /64   Pulse 79   Temp 98 8 °F (37 1 °C)   Resp 18   Ht 5' 7 75" (1 721 m)   Wt (!) 151 kg (333 lb)   SpO2 98%   BMI 51 01 kg/m²        Physical Exam   Constitutional: She appears well-developed  No distress  HENT:   Head: Normocephalic  Right Ear: External ear normal    Left Ear: External ear normal    Mouth/Throat: No oropharyngeal exudate  Eyes: Conjunctivae are normal    Neck: Neck supple  Cardiovascular: Normal rate, regular rhythm and intact distal pulses  Pulmonary/Chest: Effort normal and breath sounds normal  No respiratory distress  Abdominal: Soft  Bowel sounds are normal  There is no tenderness     Musculoskeletal: She exhibits no edema or deformity  Neurological: She is alert  Skin: Skin is warm and dry  Capillary refill takes less than 2 seconds  No rash noted  No pallor  Psychiatric: Her behavior is normal  Thought content normal    Nursing note and vitals reviewed  BMI Counseling: Body mass index is 51 01 kg/m²  The BMI is above normal  Nutrition recommendations include decreasing portion sizes and moderation in carbohydrate intake  Exercise recommendations include exercising 3-5 times per week  No pharmacotherapy was ordered                Jonathan Quinn DO

## 2020-03-18 DIAGNOSIS — I44.7 LBBB (LEFT BUNDLE BRANCH BLOCK): ICD-10-CM

## 2020-03-18 DIAGNOSIS — R60.0 BILATERAL LEG EDEMA: ICD-10-CM

## 2020-03-18 DIAGNOSIS — E78.49 OTHER HYPERLIPIDEMIA: ICD-10-CM

## 2020-03-18 RX ORDER — ATORVASTATIN CALCIUM 10 MG/1
TABLET, FILM COATED ORAL
Qty: 90 TABLET | Refills: 3 | Status: SHIPPED | OUTPATIENT
Start: 2020-03-18 | End: 2021-01-05 | Stop reason: SDUPTHER

## 2020-03-23 DIAGNOSIS — I44.7 LBBB (LEFT BUNDLE BRANCH BLOCK): ICD-10-CM

## 2020-03-23 DIAGNOSIS — R60.0 BILATERAL LEG EDEMA: ICD-10-CM

## 2020-03-23 RX ORDER — LOSARTAN POTASSIUM 25 MG/1
25 TABLET ORAL DAILY
Qty: 90 TABLET | Refills: 0 | Status: SHIPPED | OUTPATIENT
Start: 2020-03-23 | End: 2020-08-06

## 2020-03-23 RX ORDER — SPIRONOLACTONE 25 MG/1
25 TABLET ORAL DAILY
Qty: 90 TABLET | Refills: 0 | Status: SHIPPED | OUTPATIENT
Start: 2020-03-23 | End: 2020-08-06

## 2020-03-30 RX ORDER — SPIRONOLACTONE 25 MG/1
TABLET ORAL
Qty: 90 TABLET | Refills: 0 | Status: SHIPPED | OUTPATIENT
Start: 2020-03-30 | End: 2020-04-15 | Stop reason: SDUPTHER

## 2020-03-30 RX ORDER — LOSARTAN POTASSIUM 25 MG/1
TABLET ORAL
Qty: 90 TABLET | Refills: 0 | Status: SHIPPED | OUTPATIENT
Start: 2020-03-30 | End: 2020-04-15 | Stop reason: SDUPTHER

## 2020-04-15 ENCOUNTER — TELEMEDICINE (OUTPATIENT)
Dept: CARDIOLOGY CLINIC | Facility: CLINIC | Age: 55
End: 2020-04-15
Payer: COMMERCIAL

## 2020-04-15 DIAGNOSIS — R60.0 BILATERAL LEG EDEMA: ICD-10-CM

## 2020-04-15 DIAGNOSIS — I50.32 CHRONIC DIASTOLIC HF (HEART FAILURE), NYHA CLASS 2 (HCC): ICD-10-CM

## 2020-04-15 DIAGNOSIS — I44.7 LBBB (LEFT BUNDLE BRANCH BLOCK): ICD-10-CM

## 2020-04-15 PROCEDURE — 99214 OFFICE O/P EST MOD 30 MIN: CPT | Performed by: INTERNAL MEDICINE

## 2020-04-15 PROCEDURE — 4010F ACE/ARB THERAPY RXD/TAKEN: CPT | Performed by: INTERNAL MEDICINE

## 2020-04-15 RX ORDER — CARVEDILOL 6.25 MG/1
6.25 TABLET ORAL 2 TIMES DAILY WITH MEALS
Qty: 180 TABLET | Refills: 3 | Status: SHIPPED | OUTPATIENT
Start: 2020-04-15 | End: 2020-10-06 | Stop reason: SDUPTHER

## 2020-04-15 RX ORDER — SPIRONOLACTONE 25 MG/1
25 TABLET ORAL DAILY
Qty: 90 TABLET | Refills: 3 | Status: SHIPPED | OUTPATIENT
Start: 2020-04-15 | End: 2020-10-06 | Stop reason: SDUPTHER

## 2020-04-15 RX ORDER — LOSARTAN POTASSIUM 25 MG/1
25 TABLET ORAL DAILY
Qty: 90 TABLET | Refills: 3 | Status: SHIPPED | OUTPATIENT
Start: 2020-04-15 | End: 2020-10-06 | Stop reason: SDUPTHER

## 2020-04-23 ENCOUNTER — TELEPHONE (OUTPATIENT)
Dept: BARIATRICS | Facility: CLINIC | Age: 55
End: 2020-04-23

## 2020-07-30 DIAGNOSIS — R60.0 BILATERAL LEG EDEMA: ICD-10-CM

## 2020-07-30 DIAGNOSIS — I44.7 LBBB (LEFT BUNDLE BRANCH BLOCK): ICD-10-CM

## 2020-08-05 RX ORDER — TORSEMIDE 20 MG/1
TABLET ORAL
Qty: 45 TABLET | Refills: 3 | Status: SHIPPED | OUTPATIENT
Start: 2020-08-05 | End: 2020-10-06 | Stop reason: SDUPTHER

## 2020-08-06 ENCOUNTER — ANNUAL EXAM (OUTPATIENT)
Dept: OBGYN CLINIC | Facility: CLINIC | Age: 55
End: 2020-08-06
Payer: COMMERCIAL

## 2020-08-06 VITALS
DIASTOLIC BLOOD PRESSURE: 80 MMHG | WEIGHT: 293 LBS | HEIGHT: 69 IN | BODY MASS INDEX: 43.4 KG/M2 | TEMPERATURE: 97.7 F | SYSTOLIC BLOOD PRESSURE: 118 MMHG

## 2020-08-06 DIAGNOSIS — Z11.3 SCREENING EXAMINATION FOR VENEREAL DISEASE: ICD-10-CM

## 2020-08-06 DIAGNOSIS — R14.0 BLOATING: ICD-10-CM

## 2020-08-06 DIAGNOSIS — N92.6 IRREGULAR MENSES: ICD-10-CM

## 2020-08-06 DIAGNOSIS — Z01.419 ROUTINE GYNECOLOGICAL EXAMINATION: Primary | ICD-10-CM

## 2020-08-06 DIAGNOSIS — Z12.31 ENCOUNTER FOR SCREENING MAMMOGRAM FOR MALIGNANT NEOPLASM OF BREAST: ICD-10-CM

## 2020-08-06 PROCEDURE — 3079F DIAST BP 80-89 MM HG: CPT | Performed by: PHYSICIAN ASSISTANT

## 2020-08-06 PROCEDURE — 87491 CHLMYD TRACH DNA AMP PROBE: CPT | Performed by: PHYSICIAN ASSISTANT

## 2020-08-06 PROCEDURE — 3066F NEPHROPATHY DOC TX: CPT | Performed by: PHYSICIAN ASSISTANT

## 2020-08-06 PROCEDURE — 2022F DILAT RTA XM EVC RTNOPTHY: CPT | Performed by: PHYSICIAN ASSISTANT

## 2020-08-06 PROCEDURE — 3008F BODY MASS INDEX DOCD: CPT | Performed by: PHYSICIAN ASSISTANT

## 2020-08-06 PROCEDURE — G0145 SCR C/V CYTO,THINLAYER,RESCR: HCPCS | Performed by: PHYSICIAN ASSISTANT

## 2020-08-06 PROCEDURE — 3074F SYST BP LT 130 MM HG: CPT | Performed by: PHYSICIAN ASSISTANT

## 2020-08-06 PROCEDURE — S0612 ANNUAL GYNECOLOGICAL EXAMINA: HCPCS | Performed by: PHYSICIAN ASSISTANT

## 2020-08-06 PROCEDURE — 3008F BODY MASS INDEX DOCD: CPT | Performed by: INTERNAL MEDICINE

## 2020-08-06 PROCEDURE — 87624 HPV HI-RISK TYP POOLED RSLT: CPT | Performed by: PHYSICIAN ASSISTANT

## 2020-08-06 PROCEDURE — 87591 N.GONORRHOEAE DNA AMP PROB: CPT | Performed by: PHYSICIAN ASSISTANT

## 2020-08-06 NOTE — ASSESSMENT & PLAN NOTE
Pap guidelines reviewed  Pap with HPV done today  STD cultures done per patient request    Reviewed irregular menses with patient  Likely perimenopause but secondary to noticing correlation between start of menses and intercourse as well as increased bloating will plan pelvic ultrasound to further evaluate  Patient has script for routine blood work from PCP which including TFTs  Office will call with results and appropriate follow up  Return to office for annual or as needed

## 2020-08-06 NOTE — PROGRESS NOTES
Assessment/Plan   Problem List Items Addressed This Visit        Other    Routine gynecological examination - Primary     Pap guidelines reviewed  Pap with HPV done today  STD cultures done per patient request    Reviewed irregular menses with patient  Likely perimenopause but secondary to noticing correlation between start of menses and intercourse as well as increased bloating will plan pelvic ultrasound to further evaluate  Patient has script for routine blood work from PCP which including TFTs  Office will call with results and appropriate follow up  Return to office for annual or as needed  Relevant Orders    Liquid-based pap, screening      Other Visit Diagnoses     Irregular menses        Relevant Orders    US pelvis complete w transvaginal    Bloating        Relevant Orders    US pelvis complete w transvaginal    Encounter for screening mammogram for malignant neoplasm of breast        Relevant Orders    Mammo screening bilateral w 3d & cad    Screening examination for venereal disease        Relevant Orders    Chlamydia/GC amplified DNA by PCR          Subjective:     Patient ID: Leena Martinez is a 48 y o  y o  female  HPI  47 yo seen for annual exam  Reports menses sporadic anywhere from 5 weeks apart to 5 months apart  The longest she went without a menses was 9 months and then menses after was very heavy  Reports does notice that intercourse tends to bring on the bleeding the last few times  Unsure if coincidental or something to be concerned about  Denies any pain with intercourse  Denies any other pelvic pain  Does report significant pelvic bloating especially before menses where it is almost difficult to sit  Denies bowel or bladder issues  Last pap: 5/23/2018 NILM (+)HRHPV non 16, 18  2014 LGSIL  Last mammogram: 8/10/2019 BIRADS 1: Negative  Has not had colonoscopy yet, is planning no scheduling  Follows closely with PCP for routine blood work     The following portions of the patient's history were reviewed and updated as appropriate:   She  has a past medical history of Achilles tendinitis, unspecified leg (12/14/2006), Anxiety, Arthritis, Asthma, Bilateral leg edema, BMI 50 0-59 9, adult ([facility] ), Breast lump (07/11/2008), Chest pain on breathing, Chronic kidney disease, stage 1, Clotting disorder ([facility] ), Diabetes mellitus ([facility] ), Eczema, Environmental allergies, Exposure to potentially hazardous body fluids, Heart failure, left, with LVEF 41-49% ([facility] ), Hypertension (05/30/2012), Infectious mononucleosis, Lateral epicondylitis (02/17/2011), LBBB (left bundle branch block), LBBB (left bundle branch block) (2/8/2018), Lipoma, 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), Psychiatric disorder, Sialodochitis (06/01/2010), Situational anxiety, Sleep apnea, Tongue disorder, and Type 2 diabetes mellitus with kidney complication, without long-term current use of insulin ([facility] )    She   Patient Active Problem List    Diagnosis Date Noted    Routine gynecological examination 08/06/2020    Breast cancer screening 03/02/2020    Immunization due 03/02/2020    Rash 12/11/2019    Other hyperlipidemia 08/19/2019    Colon cancer screening 11/02/2018    Gynecologic exam normal 05/27/2018    Exposure to potentially hazardous body fluids 05/14/2018    Type 2 diabetes mellitus with complication, without long-term current use of insulin ([facility] ) 05/14/2018    Allergic rhinitis due to allergen 05/14/2018    Screening for cardiovascular, respiratory, and genitourinary diseases 04/23/2018    Bilateral leg edema 02/08/2018    BMI 50 0-59 9, adult ([facility] ) 02/08/2018    LBBB (left bundle branch block) 02/08/2018    Low back pain 01/10/2018    CKD stage 1 due to type 2 diabetes mellitus ([facility] ) 11/06/2016    Heart failure, left, with LVEF 41-49% ([facility] ) 08/10/2016    ARMANDO (obstructive sleep apnea) 07/22/2016    Morbid obesity ([facility] ) 07/22/2016    Chronic eczema 06/09/2016    Degenerative arthritis of thoracic spine 02/13/2015    Actinic keratosis 09/04/2012    Benign essential hypertension 09/04/2012    Generalized anxiety disorder 09/04/2012     She  has a past surgical history that includes Cholecystectomy; Colonoscopy (1997); Gallbladder surgery (1996); Essure tubal ligation (2010); Cervical biopsy w/ loop electrode excision (1992); and Gynecologic cryosurgery (1992)  Her family history includes Arthritis in her family, father, and sister; Atrial fibrillation in her father; Bone cancer in her maternal grandfather; Brain cancer in her paternal grandfather; Breast cancer (age of onset: 61) in her maternal grandmother; Diabetes type II in her mother; Heart attack in her mother; Heart disease in her father and maternal grandmother; Other in her other  She  reports that she has never smoked  She has never used smokeless tobacco  She reports that she does not drink alcohol or use drugs    Current Outpatient Medications   Medication Sig Dispense Refill    albuterol (PROAIR HFA) 90 mcg/act inhaler Inhale 1-2 puffs      atorvastatin (LIPITOR) 10 mg tablet TAKE 1 TABLET DAILY 90 tablet 3    Blood Glucose Monitoring Suppl (Mobile Card SYSTEM) w/Device KIT by Does not apply route daily [E11 8] 1 kit 0    carvedilol (COREG) 6 25 mg tablet Take 1 tablet (6 25 mg total) by mouth 2 (two) times a day with meals 180 tablet 3    glucose blood (ONETOUCH VERIO) test strip 1 each by Other route daily Use as instructed [E11 8] 100 each 5    linaGLIPtin (TRADJENTA) 5 MG TABS Take 5 mg by mouth daily 90 tablet 1    metFORMIN (GLUCOPHAGE-XR) 500 mg 24 hr tablet Take 3 tablets (1,500 mg total) by mouth daily with breakfast As directed (Patient taking differently: Take 500 mg by mouth 2 (two) times a day with meals As directed) 270 tablet 1    spironolactone (ALDACTONE) 25 mg tablet Take 1 tablet (25 mg total) by mouth daily 90 tablet 3    torsemide (DEMADEX) 20 mg tablet TAKE 1 TABLET BY MOUTH EVERY OTHER DAY 45 tablet 3    cetirizine (ZyrTEC) 10 mg tablet Take 1 tablet (10 mg total) by mouth daily for 365 doses 30 tablet 11    fexofenadine (ALLEGRA) 180 MG tablet Take 1 tablet (180 mg total) by mouth daily IN AM 30 tablet 12    losartan (COZAAR) 25 mg tablet Take 1 tablet (25 mg total) by mouth daily 90 tablet 3     No current facility-administered medications for this visit  She is allergic to vioxx [rofecoxib]       Menstrual History:  OB History        3    Para   3    Term   2       1    AB        Living   2       SAB        TAB        Ectopic        Multiple        Live Births   2           Obstetric Comments   Pregnancy complicated by Diabetes Mellitus 2005                Patient's last menstrual period was 2020 (exact date)  Review of Systems   Constitutional: Negative for fatigue, fever and unexpected weight change  HENT: Negative for dental problem and sinus pressure  Eyes: Negative for visual disturbance  Respiratory: Negative for cough, shortness of breath and wheezing  Cardiovascular: Negative for chest pain  Gastrointestinal: Negative for abdominal pain, blood in stool, constipation, diarrhea, nausea and vomiting  Endocrine: Negative for polydipsia  Genitourinary: Negative for difficulty urinating, dyspareunia, dysuria, frequency, hematuria, pelvic pain and urgency  Musculoskeletal: Negative for arthralgias and back pain  Neurological: Negative for dizziness, seizures, light-headedness and headaches  Psychiatric/Behavioral: Negative for suicidal ideas  The patient is not nervous/anxious  Objective:  Vitals:    20 1747   BP: 118/80   Temp: 97 7 °F (36 5 °C)   Weight: (!) 153 kg (338 lb)   Height: 5' 9" (1 753 m)      Physical Exam  Constitutional:       Appearance: She is well-developed  Genitourinary:      Pelvic exam was performed with patient supine        Vagina, uterus and rectum normal       No inguinal adenopathy present in the right or left side      No signs of injury or lesions in the vagina  No vaginal discharge, erythema, tenderness or bleeding  No cervical motion tenderness, discharge or lesion  Uterus is not enlarged, tender, irregular or mobile  No uterine mass detected  No right or left adnexal mass present  Right adnexa not tender or full  Left adnexa not tender or full  Rectum:      Guaiac result negative  No rectal mass, anal fissure, tenderness, external hemorrhoid, internal hemorrhoid or abnormal anal tone  HENT:      Head: Normocephalic and atraumatic  Neck:      Thyroid: No thyromegaly  Cardiovascular:      Rate and Rhythm: Normal rate and regular rhythm  Heart sounds: Normal heart sounds  No murmur  No friction rub  No gallop  Pulmonary:      Effort: Pulmonary effort is normal  No respiratory distress  Breath sounds: Normal breath sounds  No wheezing  Chest:      Breasts: Breasts are symmetrical          Right: No inverted nipple, mass, nipple discharge, skin change or tenderness  Left: No inverted nipple, mass, nipple discharge, skin change or tenderness  Abdominal:      General: There is no distension  Palpations: Abdomen is soft  There is no mass  Tenderness: There is no abdominal tenderness  There is no guarding or rebound  Hernia: No hernia is present  Lymphadenopathy:      Cervical: No cervical adenopathy  Lower Body: No right inguinal adenopathy  No left inguinal adenopathy  Neurological:      Mental Status: She is alert and oriented to person, place, and time  Skin:     General: Skin is warm and dry     Psychiatric:         Behavior: Behavior normal

## 2020-08-07 DIAGNOSIS — E11.8 TYPE 2 DIABETES MELLITUS WITH COMPLICATION, WITHOUT LONG-TERM CURRENT USE OF INSULIN (HCC): ICD-10-CM

## 2020-08-07 LAB
C TRACH DNA SPEC QL NAA+PROBE: NEGATIVE
N GONORRHOEA DNA SPEC QL NAA+PROBE: NEGATIVE

## 2020-08-07 RX ORDER — LINAGLIPTIN 5 MG/1
TABLET, FILM COATED ORAL
Qty: 90 TABLET | Refills: 3 | Status: SHIPPED | OUTPATIENT
Start: 2020-08-07 | End: 2021-01-05

## 2020-08-10 LAB
HPV HR 12 DNA CVX QL NAA+PROBE: NEGATIVE
HPV16 DNA CVX QL NAA+PROBE: NEGATIVE
HPV18 DNA CVX QL NAA+PROBE: NEGATIVE

## 2020-08-12 LAB
LAB AP GYN PRIMARY INTERPRETATION: NORMAL
LAB AP LMP: NORMAL
Lab: NORMAL

## 2020-09-26 ENCOUNTER — HOSPITAL ENCOUNTER (OUTPATIENT)
Dept: RADIOLOGY | Facility: HOSPITAL | Age: 55
Discharge: HOME | End: 2020-09-26
Payer: COMMERCIAL

## 2020-09-26 DIAGNOSIS — N92.6 IRREGULAR MENSES: ICD-10-CM

## 2020-09-26 DIAGNOSIS — R14.0 BLOATING: ICD-10-CM

## 2020-09-26 PROCEDURE — 76830 TRANSVAGINAL US NON-OB: CPT

## 2020-09-26 PROCEDURE — 76856 US EXAM PELVIC COMPLETE: CPT

## 2020-09-29 LAB
ALBUMIN SERPL-MCNC: 4.4 G/DL (ref 3.8–4.9)
ALBUMIN/GLOB SERPL: 1.5 {RATIO} (ref 1.2–2.2)
ALP SERPL-CCNC: 88 IU/L (ref 39–117)
ALT SERPL-CCNC: 13 IU/L (ref 0–32)
AST SERPL-CCNC: 15 IU/L (ref 0–40)
BILIRUB SERPL-MCNC: 0.7 MG/DL (ref 0–1.2)
BUN SERPL-MCNC: 15 MG/DL (ref 6–24)
BUN/CREAT SERPL: 21 (ref 9–23)
CALCIUM SERPL-MCNC: 9.4 MG/DL (ref 8.7–10.2)
CHLORIDE SERPL-SCNC: 98 MMOL/L (ref 96–106)
CO2 SERPL-SCNC: 24 MMOL/L (ref 20–29)
CREAT SERPL-MCNC: 0.73 MG/DL (ref 0.57–1)
GLOBULIN SER-MCNC: 3 G/DL (ref 1.5–4.5)
GLUCOSE SERPL-MCNC: 193 MG/DL (ref 65–99)
HBA1C MFR BLD: 8 % (ref 4.8–5.6)
POTASSIUM SERPL-SCNC: 4.7 MMOL/L (ref 3.5–5.2)
PROT SERPL-MCNC: 7.4 G/DL (ref 6–8.5)
SL AMB EGFR AFRICAN AMERICAN: 110 ML/MIN/1.73
SL AMB EGFR NON AFRICAN AMERICAN: 95 ML/MIN/1.73
SODIUM SERPL-SCNC: 136 MMOL/L (ref 134–144)
TSH SERPL DL<=0.005 MIU/L-ACNC: 3.09 UIU/ML (ref 0.45–4.5)

## 2020-09-29 PROCEDURE — 3052F HG A1C>EQUAL 8.0%<EQUAL 9.0%: CPT | Performed by: INTERNAL MEDICINE

## 2020-10-01 ENCOUNTER — OFFICE VISIT (OUTPATIENT)
Dept: FAMILY MEDICINE CLINIC | Facility: CLINIC | Age: 55
End: 2020-10-01
Payer: COMMERCIAL

## 2020-10-01 VITALS
TEMPERATURE: 98 F | HEIGHT: 69 IN | HEART RATE: 84 BPM | SYSTOLIC BLOOD PRESSURE: 124 MMHG | RESPIRATION RATE: 16 BRPM | WEIGHT: 293 LBS | BODY MASS INDEX: 43.4 KG/M2 | DIASTOLIC BLOOD PRESSURE: 76 MMHG

## 2020-10-01 DIAGNOSIS — E11.8 TYPE 2 DIABETES MELLITUS WITH COMPLICATION, WITHOUT LONG-TERM CURRENT USE OF INSULIN (HCC): ICD-10-CM

## 2020-10-01 DIAGNOSIS — E78.49 OTHER HYPERLIPIDEMIA: ICD-10-CM

## 2020-10-01 DIAGNOSIS — I10 BENIGN ESSENTIAL HYPERTENSION: ICD-10-CM

## 2020-10-01 DIAGNOSIS — E11.22 CKD STAGE 1 DUE TO TYPE 2 DIABETES MELLITUS (HCC): ICD-10-CM

## 2020-10-01 DIAGNOSIS — Z00.00 HEALTHCARE MAINTENANCE: Primary | ICD-10-CM

## 2020-10-01 DIAGNOSIS — N18.1 CKD STAGE 1 DUE TO TYPE 2 DIABETES MELLITUS (HCC): ICD-10-CM

## 2020-10-01 PROCEDURE — 3066F NEPHROPATHY DOC TX: CPT | Performed by: INTERNAL MEDICINE

## 2020-10-01 PROCEDURE — 99396 PREV VISIT EST AGE 40-64: CPT | Performed by: FAMILY MEDICINE

## 2020-10-01 PROCEDURE — 3078F DIAST BP <80 MM HG: CPT | Performed by: FAMILY MEDICINE

## 2020-10-01 PROCEDURE — 3725F SCREEN DEPRESSION PERFORMED: CPT | Performed by: FAMILY MEDICINE

## 2020-10-01 RX ORDER — EMPAGLIFLOZIN 10 MG/1
10 TABLET, FILM COATED ORAL EVERY MORNING
Qty: 90 TABLET | Refills: 1 | Status: SHIPPED | OUTPATIENT
Start: 2020-10-01 | End: 2020-12-08 | Stop reason: SDUPTHER

## 2020-10-06 ENCOUNTER — OFFICE VISIT (OUTPATIENT)
Dept: CARDIOLOGY CLINIC | Facility: CLINIC | Age: 55
End: 2020-10-06
Payer: COMMERCIAL

## 2020-10-06 VITALS
SYSTOLIC BLOOD PRESSURE: 120 MMHG | HEIGHT: 69 IN | BODY MASS INDEX: 43.4 KG/M2 | HEART RATE: 89 BPM | DIASTOLIC BLOOD PRESSURE: 80 MMHG | WEIGHT: 293 LBS | TEMPERATURE: 98.7 F | OXYGEN SATURATION: 96 %

## 2020-10-06 DIAGNOSIS — I50.32 CHRONIC DIASTOLIC HF (HEART FAILURE), NYHA CLASS 2 (HCC): ICD-10-CM

## 2020-10-06 DIAGNOSIS — I44.7 LBBB (LEFT BUNDLE BRANCH BLOCK): Primary | ICD-10-CM

## 2020-10-06 DIAGNOSIS — E78.49 OTHER HYPERLIPIDEMIA: ICD-10-CM

## 2020-10-06 DIAGNOSIS — R60.0 BILATERAL LEG EDEMA: ICD-10-CM

## 2020-10-06 DIAGNOSIS — I10 BENIGN ESSENTIAL HYPERTENSION: ICD-10-CM

## 2020-10-06 PROCEDURE — 99214 OFFICE O/P EST MOD 30 MIN: CPT | Performed by: INTERNAL MEDICINE

## 2020-10-06 PROCEDURE — 1036F TOBACCO NON-USER: CPT | Performed by: INTERNAL MEDICINE

## 2020-10-06 PROCEDURE — 93000 ELECTROCARDIOGRAM COMPLETE: CPT | Performed by: INTERNAL MEDICINE

## 2020-10-06 PROCEDURE — 4010F ACE/ARB THERAPY RXD/TAKEN: CPT | Performed by: INTERNAL MEDICINE

## 2020-10-06 RX ORDER — TORSEMIDE 10 MG/1
20 TABLET ORAL EVERY OTHER DAY
Qty: 45 TABLET | Refills: 3
Start: 2020-10-06 | End: 2020-10-06 | Stop reason: SDUPTHER

## 2020-10-06 RX ORDER — SPIRONOLACTONE 25 MG/1
25 TABLET ORAL DAILY
Qty: 90 TABLET | Refills: 2 | Status: SHIPPED | OUTPATIENT
Start: 2020-10-06 | End: 2021-09-21

## 2020-10-06 RX ORDER — TORSEMIDE 10 MG/1
10 TABLET ORAL EVERY OTHER DAY
Qty: 45 TABLET | Refills: 0
Start: 2020-10-06 | End: 2020-10-06 | Stop reason: SDUPTHER

## 2020-10-06 RX ORDER — CARVEDILOL 6.25 MG/1
6.25 TABLET ORAL 2 TIMES DAILY WITH MEALS
Qty: 180 TABLET | Refills: 2 | Status: SHIPPED | OUTPATIENT
Start: 2020-10-06 | End: 2021-09-21

## 2020-10-06 RX ORDER — TORSEMIDE 10 MG/1
TABLET ORAL
Qty: 45 TABLET | Refills: 0
Start: 2020-10-06 | End: 2021-06-04 | Stop reason: SDUPTHER

## 2020-10-06 RX ORDER — LOSARTAN POTASSIUM 25 MG/1
25 TABLET ORAL DAILY
Qty: 90 TABLET | Refills: 2 | Status: SHIPPED | OUTPATIENT
Start: 2020-10-06 | End: 2021-06-16

## 2020-10-27 ENCOUNTER — HOSPITAL ENCOUNTER (OUTPATIENT)
Dept: RADIOLOGY | Facility: HOSPITAL | Age: 55
Discharge: HOME | End: 2020-10-27
Payer: COMMERCIAL

## 2020-10-27 VITALS — HEIGHT: 69 IN | WEIGHT: 293 LBS | BODY MASS INDEX: 43.4 KG/M2

## 2020-10-27 DIAGNOSIS — Z12.31 ENCOUNTER FOR SCREENING MAMMOGRAM FOR MALIGNANT NEOPLASM OF BREAST: ICD-10-CM

## 2020-10-27 PROCEDURE — 77063 BREAST TOMOSYNTHESIS BI: CPT

## 2020-10-27 PROCEDURE — 77067 SCR MAMMO BI INCL CAD: CPT

## 2020-12-08 DIAGNOSIS — E11.8 TYPE 2 DIABETES MELLITUS WITH COMPLICATION, WITHOUT LONG-TERM CURRENT USE OF INSULIN (HCC): ICD-10-CM

## 2020-12-08 RX ORDER — EMPAGLIFLOZIN 10 MG/1
10 TABLET, FILM COATED ORAL EVERY MORNING
Qty: 90 TABLET | Refills: 1 | Status: SHIPPED | OUTPATIENT
Start: 2020-12-08 | End: 2021-05-14

## 2020-12-09 ENCOUNTER — TELEMEDICINE (OUTPATIENT)
Dept: FAMILY MEDICINE CLINIC | Facility: CLINIC | Age: 55
End: 2020-12-09
Payer: COMMERCIAL

## 2020-12-09 DIAGNOSIS — E66.01 MORBID OBESITY (HCC): ICD-10-CM

## 2020-12-09 DIAGNOSIS — E11.8 TYPE 2 DIABETES MELLITUS WITH COMPLICATION, WITHOUT LONG-TERM CURRENT USE OF INSULIN (HCC): ICD-10-CM

## 2020-12-09 DIAGNOSIS — Z20.822 EXPOSURE TO COVID-19 VIRUS: ICD-10-CM

## 2020-12-09 DIAGNOSIS — B34.9 VIRAL ILLNESS: ICD-10-CM

## 2020-12-09 DIAGNOSIS — I50.1 HEART FAILURE, LEFT, WITH LVEF 41-49% (HCC): ICD-10-CM

## 2020-12-09 DIAGNOSIS — Z20.822 EXPOSURE TO COVID-19 VIRUS: Primary | ICD-10-CM

## 2020-12-09 PROCEDURE — U0003 INFECTIOUS AGENT DETECTION BY NUCLEIC ACID (DNA OR RNA); SEVERE ACUTE RESPIRATORY SYNDROME CORONAVIRUS 2 (SARS-COV-2) (CORONAVIRUS DISEASE [COVID-19]), AMPLIFIED PROBE TECHNIQUE, MAKING USE OF HIGH THROUGHPUT TECHNOLOGIES AS DESCRIBED BY CMS-2020-01-R: HCPCS | Performed by: NURSE PRACTITIONER

## 2020-12-09 PROCEDURE — 99213 OFFICE O/P EST LOW 20 MIN: CPT | Performed by: NURSE PRACTITIONER

## 2020-12-10 ENCOUNTER — TELEMEDICINE (OUTPATIENT)
Dept: FAMILY MEDICINE CLINIC | Facility: CLINIC | Age: 55
End: 2020-12-10
Payer: COMMERCIAL

## 2020-12-10 ENCOUNTER — TELEPHONE (OUTPATIENT)
Dept: FAMILY MEDICINE CLINIC | Facility: CLINIC | Age: 55
End: 2020-12-10

## 2020-12-10 DIAGNOSIS — N18.1 CKD STAGE 1 DUE TO TYPE 2 DIABETES MELLITUS (HCC): ICD-10-CM

## 2020-12-10 DIAGNOSIS — I50.1 HEART FAILURE, LEFT, WITH LVEF 41-49% (HCC): ICD-10-CM

## 2020-12-10 DIAGNOSIS — U07.1 COVID-19 VIRUS INFECTION: ICD-10-CM

## 2020-12-10 DIAGNOSIS — E11.22 CKD STAGE 1 DUE TO TYPE 2 DIABETES MELLITUS (HCC): ICD-10-CM

## 2020-12-10 DIAGNOSIS — E11.8 TYPE 2 DIABETES MELLITUS WITH COMPLICATION, WITHOUT LONG-TERM CURRENT USE OF INSULIN (HCC): Primary | ICD-10-CM

## 2020-12-10 LAB — SARS-COV-2 RNA SPEC QL NAA+PROBE: DETECTED

## 2020-12-10 PROCEDURE — 3066F NEPHROPATHY DOC TX: CPT | Performed by: NURSE PRACTITIONER

## 2020-12-10 PROCEDURE — 99214 OFFICE O/P EST MOD 30 MIN: CPT | Performed by: NURSE PRACTITIONER

## 2020-12-10 RX ORDER — SODIUM CHLORIDE 9 MG/ML
20 INJECTION, SOLUTION INTRAVENOUS ONCE
Status: CANCELLED | OUTPATIENT
Start: 2020-12-12

## 2020-12-10 RX ORDER — ACETAMINOPHEN 325 MG/1
650 TABLET ORAL ONCE AS NEEDED
Status: CANCELLED | OUTPATIENT
Start: 2020-12-12

## 2020-12-10 RX ORDER — ALBUTEROL SULFATE 90 UG/1
3 AEROSOL, METERED RESPIRATORY (INHALATION) ONCE AS NEEDED
Status: CANCELLED | OUTPATIENT
Start: 2020-12-12

## 2020-12-12 ENCOUNTER — HOSPITAL ENCOUNTER (OUTPATIENT)
Dept: INFUSION CENTER | Facility: HOSPITAL | Age: 55
Discharge: HOME | End: 2020-12-12
Payer: COMMERCIAL

## 2020-12-12 VITALS
OXYGEN SATURATION: 97 % | RESPIRATION RATE: 16 BRPM | TEMPERATURE: 98.2 F | DIASTOLIC BLOOD PRESSURE: 61 MMHG | SYSTOLIC BLOOD PRESSURE: 103 MMHG | HEART RATE: 65 BPM

## 2020-12-12 DIAGNOSIS — N18.1 CKD STAGE 1 DUE TO TYPE 2 DIABETES MELLITUS (HCC): ICD-10-CM

## 2020-12-12 DIAGNOSIS — I50.1 HEART FAILURE, LEFT, WITH LVEF 41-49% (HCC): Primary | ICD-10-CM

## 2020-12-12 DIAGNOSIS — U07.1 COVID-19 VIRUS INFECTION: ICD-10-CM

## 2020-12-12 DIAGNOSIS — E11.8 TYPE 2 DIABETES MELLITUS WITH COMPLICATION, WITHOUT LONG-TERM CURRENT USE OF INSULIN (HCC): ICD-10-CM

## 2020-12-12 DIAGNOSIS — E11.22 CKD STAGE 1 DUE TO TYPE 2 DIABETES MELLITUS (HCC): ICD-10-CM

## 2020-12-12 PROCEDURE — M0239 BAMLANIVIMAB-XXXX INFUSION: HCPCS | Performed by: FAMILY MEDICINE

## 2020-12-12 RX ORDER — ACETAMINOPHEN 325 MG/1
650 TABLET ORAL ONCE AS NEEDED
Status: CANCELLED | OUTPATIENT
Start: 2020-12-12

## 2020-12-12 RX ORDER — ACETAMINOPHEN 325 MG/1
650 TABLET ORAL ONCE AS NEEDED
Status: DISCONTINUED | OUTPATIENT
Start: 2020-12-12 | End: 2020-12-15 | Stop reason: HOSPADM

## 2020-12-12 RX ORDER — SODIUM CHLORIDE 9 MG/ML
20 INJECTION, SOLUTION INTRAVENOUS ONCE
Status: COMPLETED | OUTPATIENT
Start: 2020-12-12 | End: 2020-12-12

## 2020-12-12 RX ORDER — ALBUTEROL SULFATE 90 UG/1
3 AEROSOL, METERED RESPIRATORY (INHALATION) ONCE AS NEEDED
Status: CANCELLED | OUTPATIENT
Start: 2020-12-12

## 2020-12-12 RX ORDER — SODIUM CHLORIDE 9 MG/ML
20 INJECTION, SOLUTION INTRAVENOUS ONCE
Status: CANCELLED | OUTPATIENT
Start: 2020-12-12

## 2020-12-12 RX ORDER — ALBUTEROL SULFATE 90 UG/1
3 AEROSOL, METERED RESPIRATORY (INHALATION) ONCE AS NEEDED
Status: DISCONTINUED | OUTPATIENT
Start: 2020-12-12 | End: 2020-12-15 | Stop reason: HOSPADM

## 2020-12-12 RX ADMIN — SODIUM CHLORIDE 20 ML/HR: 0.9 INJECTION, SOLUTION INTRAVENOUS at 09:06

## 2020-12-12 RX ADMIN — SODIUM CHLORIDE 700 MG: 9 INJECTION, SOLUTION INTRAVENOUS at 09:31

## 2020-12-13 ENCOUNTER — TELEMEDICINE (OUTPATIENT)
Dept: FAMILY MEDICINE CLINIC | Facility: CLINIC | Age: 55
End: 2020-12-13
Payer: COMMERCIAL

## 2020-12-13 DIAGNOSIS — E11.8 TYPE 2 DIABETES MELLITUS WITH COMPLICATION, WITHOUT LONG-TERM CURRENT USE OF INSULIN (HCC): ICD-10-CM

## 2020-12-13 DIAGNOSIS — U07.1 COVID-19 VIRUS INFECTION: Primary | ICD-10-CM

## 2020-12-13 DIAGNOSIS — I50.1 HEART FAILURE, LEFT, WITH LVEF 41-49% (HCC): ICD-10-CM

## 2020-12-13 PROCEDURE — 99213 OFFICE O/P EST LOW 20 MIN: CPT | Performed by: NURSE PRACTITIONER

## 2020-12-13 PROCEDURE — 1036F TOBACCO NON-USER: CPT | Performed by: NURSE PRACTITIONER

## 2020-12-16 ENCOUNTER — TELEPHONE (OUTPATIENT)
Dept: FAMILY MEDICINE CLINIC | Facility: CLINIC | Age: 55
End: 2020-12-16

## 2021-01-02 ENCOUNTER — APPOINTMENT (OUTPATIENT)
Dept: LAB | Age: 56
End: 2021-01-02
Payer: COMMERCIAL

## 2021-01-02 DIAGNOSIS — E11.8 TYPE 2 DIABETES MELLITUS WITH COMPLICATION, WITHOUT LONG-TERM CURRENT USE OF INSULIN (HCC): ICD-10-CM

## 2021-01-02 DIAGNOSIS — E66.01 MORBID OBESITY (HCC): ICD-10-CM

## 2021-01-02 LAB
ALBUMIN SERPL BCP-MCNC: 4 G/DL (ref 3.5–5)
ALP SERPL-CCNC: 83 U/L (ref 46–116)
ALT SERPL W P-5'-P-CCNC: 27 U/L (ref 12–78)
ANION GAP SERPL CALCULATED.3IONS-SCNC: 3 MMOL/L (ref 4–13)
AST SERPL W P-5'-P-CCNC: 17 U/L (ref 5–45)
BILIRUB SERPL-MCNC: 0.78 MG/DL (ref 0.2–1)
BUN SERPL-MCNC: 13 MG/DL (ref 5–25)
CALCIUM SERPL-MCNC: 9.5 MG/DL (ref 8.3–10.1)
CHLORIDE SERPL-SCNC: 105 MMOL/L (ref 100–108)
CO2 SERPL-SCNC: 28 MMOL/L (ref 21–32)
CREAT SERPL-MCNC: 0.76 MG/DL (ref 0.6–1.3)
CREAT UR-MCNC: 108 MG/DL
EST. AVERAGE GLUCOSE BLD GHB EST-MCNC: 137 MG/DL
GFR SERPL CREATININE-BSD FRML MDRD: 90 ML/MIN/1.73SQ M
GLUCOSE P FAST SERPL-MCNC: 128 MG/DL (ref 65–99)
HBA1C MFR BLD: 6.4 %
MICROALBUMIN UR-MCNC: 9.4 MG/L (ref 0–20)
MICROALBUMIN/CREAT 24H UR: 9 MG/G CREATININE (ref 0–30)
POTASSIUM SERPL-SCNC: 4.3 MMOL/L (ref 3.5–5.3)
PROT SERPL-MCNC: 8.2 G/DL (ref 6.4–8.2)
SODIUM SERPL-SCNC: 136 MMOL/L (ref 136–145)
TSH SERPL DL<=0.05 MIU/L-ACNC: 1.96 UIU/ML (ref 0.36–3.74)

## 2021-01-02 PROCEDURE — 82570 ASSAY OF URINE CREATININE: CPT

## 2021-01-02 PROCEDURE — 36415 COLL VENOUS BLD VENIPUNCTURE: CPT

## 2021-01-02 PROCEDURE — 83036 HEMOGLOBIN GLYCOSYLATED A1C: CPT

## 2021-01-02 PROCEDURE — 3061F NEG MICROALBUMINURIA REV: CPT | Performed by: FAMILY MEDICINE

## 2021-01-02 PROCEDURE — 80053 COMPREHEN METABOLIC PANEL: CPT

## 2021-01-02 PROCEDURE — 82043 UR ALBUMIN QUANTITATIVE: CPT

## 2021-01-02 PROCEDURE — 3044F HG A1C LEVEL LT 7.0%: CPT | Performed by: FAMILY MEDICINE

## 2021-01-02 PROCEDURE — 84443 ASSAY THYROID STIM HORMONE: CPT

## 2021-01-05 ENCOUNTER — OFFICE VISIT (OUTPATIENT)
Dept: FAMILY MEDICINE CLINIC | Facility: CLINIC | Age: 56
End: 2021-01-05
Payer: COMMERCIAL

## 2021-01-05 VITALS
WEIGHT: 293 LBS | OXYGEN SATURATION: 96 % | HEIGHT: 69 IN | HEART RATE: 78 BPM | SYSTOLIC BLOOD PRESSURE: 130 MMHG | RESPIRATION RATE: 18 BRPM | DIASTOLIC BLOOD PRESSURE: 72 MMHG | BODY MASS INDEX: 43.4 KG/M2 | TEMPERATURE: 98.6 F

## 2021-01-05 DIAGNOSIS — E78.49 OTHER HYPERLIPIDEMIA: ICD-10-CM

## 2021-01-05 DIAGNOSIS — I10 BENIGN ESSENTIAL HYPERTENSION: ICD-10-CM

## 2021-01-05 DIAGNOSIS — E66.01 MORBID OBESITY (HCC): ICD-10-CM

## 2021-01-05 DIAGNOSIS — N18.1 CKD STAGE 1 DUE TO TYPE 2 DIABETES MELLITUS (HCC): ICD-10-CM

## 2021-01-05 DIAGNOSIS — E11.22 CKD STAGE 1 DUE TO TYPE 2 DIABETES MELLITUS (HCC): ICD-10-CM

## 2021-01-05 DIAGNOSIS — Z12.11 COLON CANCER SCREENING: ICD-10-CM

## 2021-01-05 DIAGNOSIS — E11.8 TYPE 2 DIABETES MELLITUS WITH COMPLICATION, WITHOUT LONG-TERM CURRENT USE OF INSULIN (HCC): Primary | ICD-10-CM

## 2021-01-05 PROCEDURE — 1036F TOBACCO NON-USER: CPT | Performed by: FAMILY MEDICINE

## 2021-01-05 PROCEDURE — 3078F DIAST BP <80 MM HG: CPT | Performed by: FAMILY MEDICINE

## 2021-01-05 PROCEDURE — 99214 OFFICE O/P EST MOD 30 MIN: CPT | Performed by: FAMILY MEDICINE

## 2021-01-05 PROCEDURE — 3075F SYST BP GE 130 - 139MM HG: CPT | Performed by: FAMILY MEDICINE

## 2021-01-05 PROCEDURE — 3066F NEPHROPATHY DOC TX: CPT | Performed by: FAMILY MEDICINE

## 2021-01-05 PROCEDURE — 3008F BODY MASS INDEX DOCD: CPT | Performed by: FAMILY MEDICINE

## 2021-01-05 RX ORDER — ATORVASTATIN CALCIUM 10 MG/1
10 TABLET, FILM COATED ORAL DAILY
Qty: 90 TABLET | Refills: 1 | Status: SHIPPED | OUTPATIENT
Start: 2021-01-05 | End: 2021-08-02

## 2021-01-05 RX ORDER — METFORMIN HYDROCHLORIDE 500 MG/1
500 TABLET, EXTENDED RELEASE ORAL 2 TIMES DAILY WITH MEALS
Qty: 180 TABLET | Refills: 1 | Status: SHIPPED | OUTPATIENT
Start: 2021-01-05 | End: 2021-05-19 | Stop reason: SDUPTHER

## 2021-01-05 NOTE — PROGRESS NOTES
Assessment/Plan:    No problem-specific Assessment & Plan notes found for this encounter  DM2 stable on meds but change tradjenta to [facility] due to formularly  htn stable  CHF, f/u cardiology  ckd1 stable  Obesity aware, continue to try to lose wt and follow diet  HDL stable on statin    Lab Results   Component Value Date    HGBA1C 6 4 (H) 01/02/2021    HGBA1C 8 0 (H) 09/29/2020    HGBA1C 7 5 (H) 02/28/2020     Lab Results   Component Value Date    GLUF 128 (H) 01/02/2021    LDLCALC 78 02/28/2020    CREATININE 0 76 01/02/2021        Diagnoses and all orders for this visit:    Type 2 diabetes mellitus with complication, without long-term current use of insulin (HCC)  -     metFORMIN (GLUCOPHAGE-XR) 500 mg 24 hr tablet; Take 1 tablet (500 mg total) by mouth 2 (two) times a day with meals As directed  -     sitaGLIPtin (JANUVIA) 100 mg tablet; Take 1 tablet (100 mg total) by mouth daily  -     Comprehensive metabolic panel; Future  -     Hemoglobin A1C; Future    Morbid obesity (HCC)    Benign essential hypertension    CKD stage 1 due to type 2 diabetes mellitus (HCC)    Other hyperlipidemia  -     atorvastatin (LIPITOR) 10 mg tablet; Take 1 tablet (10 mg total) by mouth daily    Colon cancer screening  -     Ambulatory referral to Gastroenterology; Future          Return in about 3 months (around 4/5/2021) for Recheck  Subjective:      Patient ID: Theo Perry is a 50 y o  female      Chief Complaint   Patient presents with    Follow-up     3mo f/u  Mundo Higginbotham       HPI  S/p covid  Some wt loss  Following diet, keto  More walking  Keto diet  tradjenta not covered but [facility] is    Colonoscopy planned    On metformin 1000mg/d not 1500mg/d    The following portions of the patient's history were reviewed and updated as appropriate: allergies, current medications, past family history, past medical history, past social history, past surgical history and problem list     Review of Systems   Respiratory: Negative for shortness of breath  Cardiovascular: Negative for chest pain  Current Outpatient Medications   Medication Sig Dispense Refill    albuterol (PROAIR HFA) 90 mcg/act inhaler Inhale 1-2 puffs      atorvastatin (LIPITOR) 10 mg tablet Take 1 tablet (10 mg total) by mouth daily 90 tablet 1    carvedilol (COREG) 6 25 mg tablet Take 1 tablet (6 25 mg total) by mouth 2 (two) times a day with meals 180 tablet 2    Empagliflozin (Jardiance) 10 MG TABS Take 1 tablet (10 mg total) by mouth every morning 90 tablet 1    losartan (COZAAR) 25 mg tablet Take 1 tablet (25 mg total) by mouth daily 90 tablet 2    metFORMIN (GLUCOPHAGE-XR) 500 mg 24 hr tablet Take 1 tablet (500 mg total) by mouth 2 (two) times a day with meals As directed 180 tablet 1    spironolactone (ALDACTONE) 25 mg tablet Take 1 tablet (25 mg total) by mouth daily 90 tablet 2    torsemide (DEMADEX) 10 mg tablet Mon-Frid- Sunday morning (Patient taking differently: as needed Mon-Frid- Sunday morning) 45 tablet 0    sitaGLIPtin (JANUVIA) 100 mg tablet Take 1 tablet (100 mg total) by mouth daily 90 tablet 1     No current facility-administered medications for this visit  Objective:    /72   Pulse 78   Temp 98 6 °F (37 °C)   Resp 18   Ht 5' 9" (1 753 m)   Wt (!) 145 kg (320 lb)   SpO2 96%   BMI 47 26 kg/m²        Physical Exam  Vitals signs and nursing note reviewed  Constitutional:       Appearance: She is well-developed  She is obese  HENT:      Head: Normocephalic  Right Ear: Tympanic membrane normal       Left Ear: Tympanic membrane normal    Eyes:      Conjunctiva/sclera: Conjunctivae normal    Neck:      Musculoskeletal: Neck supple  Cardiovascular:      Rate and Rhythm: Normal rate and regular rhythm  Heart sounds: No murmur  Pulmonary:      Effort: Pulmonary effort is normal  No respiratory distress  Breath sounds: No rales  Abdominal:      Palpations: Abdomen is soft     Musculoskeletal: General: No deformity  Skin:     General: Skin is warm and dry  Coloration: Skin is not pale  Neurological:      Mental Status: She is alert  Psychiatric:         Behavior: Behavior normal          Thought Content: Thought content normal          BMI Counseling: Body mass index is 47 26 kg/m²  The BMI is above normal  Nutrition recommendations include decreasing portion sizes and moderation in carbohydrate intake  Exercise recommendations include exercising 3-5 times per week  No pharmacotherapy was ordered                Fernanda Garcia DO

## 2021-01-19 ENCOUNTER — TELEPHONE (OUTPATIENT)
Dept: GASTROENTEROLOGY | Facility: CLINIC | Age: 56
End: 2021-01-19

## 2021-01-19 NOTE — TELEPHONE ENCOUNTER
I lmom for pt to please call back to schedule an appt per Dr Jn Brown for consult to colon screening  Will call pt again in one week if do not hear back from her

## 2021-01-26 NOTE — TELEPHONE ENCOUNTER
I lmom for pt to please call back to schedule an appt per Dr Harriet Dickens  Will call pt again in two weeks if do not hear back from her

## 2021-02-09 NOTE — TELEPHONE ENCOUNTER
Called and spoke to pt whom informed that she is at work and will call back to schedule  Will wait for pt's call back at this time

## 2021-02-19 DIAGNOSIS — Z23 ENCOUNTER FOR IMMUNIZATION: ICD-10-CM

## 2021-03-02 ENCOUNTER — IMMUNIZATIONS (OUTPATIENT)
Dept: FAMILY MEDICINE CLINIC | Facility: HOSPITAL | Age: 56
End: 2021-03-02

## 2021-03-02 DIAGNOSIS — Z23 ENCOUNTER FOR IMMUNIZATION: Primary | ICD-10-CM

## 2021-03-02 PROCEDURE — 91300 SARS-COV-2 / COVID-19 MRNA VACCINE (PFIZER-BIONTECH) 30 MCG: CPT

## 2021-03-02 PROCEDURE — 0001A SARS-COV-2 / COVID-19 MRNA VACCINE (PFIZER-BIONTECH) 30 MCG: CPT

## 2021-03-10 ENCOUNTER — HOSPITAL ENCOUNTER (OUTPATIENT)
Dept: RADIOLOGY | Facility: HOSPITAL | Age: 56
Discharge: HOME | End: 2021-03-10
Payer: COMMERCIAL

## 2021-03-10 ENCOUNTER — OFFICE VISIT (OUTPATIENT)
Dept: FAMILY MEDICINE CLINIC | Facility: CLINIC | Age: 56
End: 2021-03-10
Payer: COMMERCIAL

## 2021-03-10 VITALS
SYSTOLIC BLOOD PRESSURE: 114 MMHG | TEMPERATURE: 98.6 F | RESPIRATION RATE: 18 BRPM | HEIGHT: 69 IN | OXYGEN SATURATION: 97 % | DIASTOLIC BLOOD PRESSURE: 78 MMHG | HEART RATE: 80 BPM | BODY MASS INDEX: 43.4 KG/M2 | WEIGHT: 293 LBS

## 2021-03-10 DIAGNOSIS — R23.2 FLUSHING: ICD-10-CM

## 2021-03-10 DIAGNOSIS — R10.811 RIGHT UPPER QUADRANT ABDOMINAL TENDERNESS, REBOUND TENDERNESS PRESENCE NOT SPECIFIED: ICD-10-CM

## 2021-03-10 DIAGNOSIS — R11.0 NAUSEA: ICD-10-CM

## 2021-03-10 DIAGNOSIS — R10.11 RUQ PAIN: Primary | ICD-10-CM

## 2021-03-10 DIAGNOSIS — I10 BENIGN ESSENTIAL HYPERTENSION: ICD-10-CM

## 2021-03-10 DIAGNOSIS — R10.11 RUQ PAIN: ICD-10-CM

## 2021-03-10 LAB
SL AMB  POCT GLUCOSE, UA: ABNORMAL
SL AMB LEUKOCYTE ESTERASE,UA: ABNORMAL
SL AMB POCT BILIRUBIN,UA: ABNORMAL
SL AMB POCT BLOOD,UA: ABNORMAL
SL AMB POCT CLARITY,UA: CLEAR
SL AMB POCT COLOR,UA: YELLOW
SL AMB POCT KETONES,UA: ABNORMAL
SL AMB POCT NITRITE,UA: ABNORMAL
SL AMB POCT PH,UA: 5
SL AMB POCT SPECIFIC GRAVITY,UA: 1.02
SL AMB POCT URINE PROTEIN: ABNORMAL
SL AMB POCT UROBILINOGEN: ABNORMAL

## 2021-03-10 PROCEDURE — 3061F NEG MICROALBUMINURIA REV: CPT | Performed by: NURSE PRACTITIONER

## 2021-03-10 PROCEDURE — 99214 OFFICE O/P EST MOD 30 MIN: CPT | Performed by: NURSE PRACTITIONER

## 2021-03-10 PROCEDURE — 1036F TOBACCO NON-USER: CPT | Performed by: NURSE PRACTITIONER

## 2021-03-10 PROCEDURE — 74177 CT ABD & PELVIS W/CONTRAST: CPT

## 2021-03-10 PROCEDURE — 81003 URINALYSIS AUTO W/O SCOPE: CPT | Performed by: NURSE PRACTITIONER

## 2021-03-10 PROCEDURE — G1004 CDSM NDSC: HCPCS

## 2021-03-10 PROCEDURE — 3008F BODY MASS INDEX DOCD: CPT | Performed by: NURSE PRACTITIONER

## 2021-03-10 PROCEDURE — 3078F DIAST BP <80 MM HG: CPT | Performed by: NURSE PRACTITIONER

## 2021-03-10 PROCEDURE — 3074F SYST BP LT 130 MM HG: CPT | Performed by: NURSE PRACTITIONER

## 2021-03-10 RX ADMIN — IOHEXOL 100 ML: 350 INJECTION, SOLUTION INTRAVENOUS at 15:38

## 2021-03-10 RX ADMIN — IOHEXOL 50 ML: 240 INJECTION, SOLUTION INTRATHECAL; INTRAVASCULAR; INTRAVENOUS; ORAL at 15:38

## 2021-03-10 NOTE — PROGRESS NOTES
Assessment/Plan:  Urine dip in office did not have any suspicion of UTI and will do STAT CT abdomen to r/o appendicitis and will also follow up with urine culture results    1  RUQ pain  -     CT abdomen pelvis w contrast; Future; Expected date: 03/10/2021  -     POCT urine dip auto non-scope  -     Urine culture    2  Nausea  -     CT abdomen pelvis w contrast; Future; Expected date: 03/10/2021  -     POCT urine dip auto non-scope  -     Urine culture    3  Right upper quadrant abdominal tenderness, rebound tenderness presence not specified  -     CT abdomen pelvis w contrast; Future; Expected date: 03/10/2021  -     POCT urine dip auto non-scope  -     Urine culture    4  Flushing  -     CT abdomen pelvis w contrast; Future; Expected date: 03/10/2021  -     POCT urine dip auto non-scope  -     Urine culture    5  Benign essential hypertension  Comments:  stable with current regimen              Patient Instructions:  Supportive care discussed and advised  Advised to RTO for any worsening and no improvement  Follow up for no improvement and worsening of conditions  Patient advised and educated when to see immediate medical care  Return if symptoms worsen or fail to improve  Future Appointments   Date Time Provider Mariann Dean   3/10/2021  3:30 PM [place] [id number]   3/23/2021  5:40 PM BE COVID VACCINE RESOURCE BE [facility]   4/13/2021  5:00 PM Remonia Lunch, DO [facility]           Subjective:      Patient ID: Sarwat Lazaro is a 50 y o  female  Chief Complaint   Patient presents with    Flank Pain     right side, dull pain  sas/cma         Vitals:  /78   Pulse 80   Temp 98 6 °F (37 °C)   Resp 18   Ht 5' 9" (1 753 m)   Wt (!) 147 kg (323 lb)   SpO2 97%   BMI 47 70 kg/m²     HPI  Patient stated that started with RLQ pain on 03/7/2021 and stated that pain is about 5 to 6/10 and not improving and last night got bad nausea with flushed feeling   Denies any fever, chills, vomiting and diarrhea  Denies any urinary symptoms  Also has right sided back pain which is chronic and nothing changed with that pain  Complaint with other medications and tolerating it well        The following portions of the patient's history were reviewed and updated as appropriate: allergies, current medications, past family history, past medical history, past social history, past surgical history and problem list       Review of Systems   Constitutional: Negative for appetite change, chills, fever and unexpected weight change  HENT: Negative  Respiratory: Negative  Cardiovascular: Negative for chest pain and palpitations  Gastrointestinal: Positive for abdominal pain and nausea  Negative for anal bleeding, blood in stool, constipation, diarrhea, rectal pain and vomiting  Genitourinary: Negative  Musculoskeletal: Negative  Skin:        As noted in HPI     Neurological: Negative for dizziness and headaches  Hematological: Negative  Objective:    Social History     Tobacco Use   Smoking Status Never Smoker   Smokeless Tobacco Never Used   Tobacco Comment    N/a       Allergies:    Allergies   Allergen Reactions    Vioxx [Rofecoxib] Swelling         Current Outpatient Medications   Medication Sig Dispense Refill    albuterol (PROAIR HFA) 90 mcg/act inhaler Inhale 1-2 puffs      atorvastatin (LIPITOR) 10 mg tablet Take 1 tablet (10 mg total) by mouth daily 90 tablet 1    carvedilol (COREG) 6 25 mg tablet Take 1 tablet (6 25 mg total) by mouth 2 (two) times a day with meals 180 tablet 2    Empagliflozin (Jardiance) 10 MG TABS Take 1 tablet (10 mg total) by mouth every morning 90 tablet 1    losartan (COZAAR) 25 mg tablet Take 1 tablet (25 mg total) by mouth daily 90 tablet 2    metFORMIN (GLUCOPHAGE-XR) 500 mg 24 hr tablet Take 1 tablet (500 mg total) by mouth 2 (two) times a day with meals As directed 180 tablet 1    sitaGLIPtin (JANUVIA) 100 mg tablet Take 1 tablet (100 mg total) by mouth daily 90 tablet 1    spironolactone (ALDACTONE) 25 mg tablet Take 1 tablet (25 mg total) by mouth daily 90 tablet 2    torsemide (DEMADEX) 10 mg tablet Mon-Frid- Sunday morning (Patient not taking: Reported on 3/10/2021) 45 tablet 0     No current facility-administered medications for this visit  Physical Exam  Vitals signs reviewed  Constitutional:       Appearance: She is well-developed  Cardiovascular:      Rate and Rhythm: Normal rate and regular rhythm  Heart sounds: Normal heart sounds  Pulmonary:      Effort: Pulmonary effort is normal       Breath sounds: Normal breath sounds  Abdominal:      General: Bowel sounds are normal       Palpations: Abdomen is soft  Tenderness: There is abdominal tenderness in the right lower quadrant  There is guarding  There is no rebound  Positive signs include McBurney's sign  Skin:     General: Skin is warm and dry  Neurological:      Mental Status: She is alert and oriented to person, place, and time  Psychiatric:         Behavior: Behavior normal          Thought Content:  Thought content normal          Judgment: Judgment normal                      ZOE Rome

## 2021-03-11 LAB
BACTERIA UR CULT: NORMAL
Lab: NORMAL

## 2021-03-23 ENCOUNTER — IMMUNIZATIONS (OUTPATIENT)
Dept: FAMILY MEDICINE CLINIC | Facility: HOSPITAL | Age: 56
End: 2021-03-23

## 2021-03-23 DIAGNOSIS — Z23 ENCOUNTER FOR IMMUNIZATION: Primary | ICD-10-CM

## 2021-03-23 PROCEDURE — 0002A SARS-COV-2 / COVID-19 MRNA VACCINE (PFIZER-BIONTECH) 30 MCG: CPT

## 2021-03-23 PROCEDURE — 91300 SARS-COV-2 / COVID-19 MRNA VACCINE (PFIZER-BIONTECH) 30 MCG: CPT

## 2021-04-07 ENCOUNTER — RA CDI HCC (OUTPATIENT)
Dept: OTHER | Facility: HOSPITAL | Age: 56
End: 2021-04-07

## 2021-04-07 NOTE — PROGRESS NOTES
[place] [address]  coding opportunities             Chart reviewed, (number of) suggestions sent to provider: 1           Patients insurance company: [employer] (Medicare Advantage and Commercial)     Visit status: Patient arrived for their scheduled appointment     Provider never responded to [place] [address]  coding request     [place] [address]  coding oppertunities             Chart reviewed, (number of) suggestions sent to provider: 1                 I13 0 : Hypertensive heart and chronic kidney disease with heart failure and stage 1 through stage 4 chronic kidney disease, or unspecified chronic kidney disease ([place] [address] )

## 2021-04-10 PROBLEM — Z77.21 EXPOSURE TO POTENTIALLY HAZARDOUS BODY FLUIDS: Status: RESOLVED | Noted: 2018-05-14 | Resolved: 2021-04-10

## 2021-04-10 PROBLEM — Z86.16 HISTORY OF 2019 NOVEL CORONAVIRUS DISEASE (COVID-19): Status: ACTIVE | Noted: 2021-04-10

## 2021-04-12 LAB
ALBUMIN SERPL-MCNC: 4.4 G/DL (ref 3.8–4.9)
ALBUMIN/GLOB SERPL: 1.3 {RATIO} (ref 1.2–2.2)
ALP SERPL-CCNC: 90 IU/L (ref 39–117)
ALT SERPL-CCNC: 15 IU/L (ref 0–32)
AST SERPL-CCNC: 13 IU/L (ref 0–40)
BILIRUB SERPL-MCNC: 0.6 MG/DL (ref 0–1.2)
BUN SERPL-MCNC: 14 MG/DL (ref 6–24)
BUN/CREAT SERPL: 19 (ref 9–23)
CALCIUM SERPL-MCNC: 9.3 MG/DL (ref 8.7–10.2)
CHLORIDE SERPL-SCNC: 100 MMOL/L (ref 96–106)
CO2 SERPL-SCNC: 24 MMOL/L (ref 20–29)
CREAT SERPL-MCNC: 0.75 MG/DL (ref 0.57–1)
GLOBULIN SER-MCNC: 3.3 G/DL (ref 1.5–4.5)
GLUCOSE SERPL-MCNC: 142 MG/DL (ref 65–99)
HBA1C MFR BLD: 6.6 % (ref 4.8–5.6)
POTASSIUM SERPL-SCNC: 4.7 MMOL/L (ref 3.5–5.2)
PROT SERPL-MCNC: 7.7 G/DL (ref 6–8.5)
SL AMB EGFR AFRICAN AMERICAN: 105 ML/MIN/1.73
SL AMB EGFR NON AFRICAN AMERICAN: 91 ML/MIN/1.73
SODIUM SERPL-SCNC: 138 MMOL/L (ref 134–144)

## 2021-04-12 PROCEDURE — 3044F HG A1C LEVEL LT 7.0%: CPT | Performed by: NURSE PRACTITIONER

## 2021-04-13 ENCOUNTER — OFFICE VISIT (OUTPATIENT)
Dept: FAMILY MEDICINE CLINIC | Facility: CLINIC | Age: 56
End: 2021-04-13
Payer: COMMERCIAL

## 2021-04-13 VITALS
RESPIRATION RATE: 20 BRPM | BODY MASS INDEX: 43.4 KG/M2 | WEIGHT: 293 LBS | HEART RATE: 80 BPM | DIASTOLIC BLOOD PRESSURE: 70 MMHG | SYSTOLIC BLOOD PRESSURE: 120 MMHG | TEMPERATURE: 97.8 F | HEIGHT: 69 IN | OXYGEN SATURATION: 95 %

## 2021-04-13 DIAGNOSIS — L98.9 SKIN LESION: ICD-10-CM

## 2021-04-13 DIAGNOSIS — E11.22 CKD STAGE 1 DUE TO TYPE 2 DIABETES MELLITUS (HCC): ICD-10-CM

## 2021-04-13 DIAGNOSIS — I10 BENIGN ESSENTIAL HYPERTENSION: ICD-10-CM

## 2021-04-13 DIAGNOSIS — R47.89 WORD FINDING DIFFICULTY: ICD-10-CM

## 2021-04-13 DIAGNOSIS — E78.49 OTHER HYPERLIPIDEMIA: ICD-10-CM

## 2021-04-13 DIAGNOSIS — S76.212A STRAIN OF GROIN, LEFT, INITIAL ENCOUNTER: ICD-10-CM

## 2021-04-13 DIAGNOSIS — R41.3 MEMORY DIFFICULTIES: ICD-10-CM

## 2021-04-13 DIAGNOSIS — R21 RASH: ICD-10-CM

## 2021-04-13 DIAGNOSIS — E66.01 MORBID OBESITY (HCC): ICD-10-CM

## 2021-04-13 DIAGNOSIS — E11.8 TYPE 2 DIABETES MELLITUS WITH COMPLICATION, WITHOUT LONG-TERM CURRENT USE OF INSULIN (HCC): Primary | ICD-10-CM

## 2021-04-13 DIAGNOSIS — N18.1 CKD STAGE 1 DUE TO TYPE 2 DIABETES MELLITUS (HCC): ICD-10-CM

## 2021-04-13 PROCEDURE — 3725F SCREEN DEPRESSION PERFORMED: CPT | Performed by: FAMILY MEDICINE

## 2021-04-13 PROCEDURE — 3066F NEPHROPATHY DOC TX: CPT | Performed by: NURSE PRACTITIONER

## 2021-04-13 PROCEDURE — 99214 OFFICE O/P EST MOD 30 MIN: CPT | Performed by: FAMILY MEDICINE

## 2021-04-13 NOTE — PROGRESS NOTES
Assessment/Plan:    No problem-specific Assessment & Plan notes found for this encounter  DM2 stable, work on wt loss    htn stable    Left adductor strain new, ortho suggested if no better after PT    CKD1 stable    Neuro eval for word finding dysfunction and memory issues, covid related? Diagnoses and all orders for this visit:    Type 2 diabetes mellitus with complication, without long-term current use of insulin (HCC)  -     Comprehensive metabolic panel; Future  -     Hemoglobin A1C; Future    Benign essential hypertension    Other hyperlipidemia    CKD stage 1 due to type 2 diabetes mellitus (HCC)    Strain of groin, left, initial encounter  -     Ambulatory referral to Physical Therapy; Future    Word finding difficulty  -     Ambulatory referral to Neurology; Future    Memory difficulties  -     Ambulatory referral to Neurology; Future    Rash    Skin lesion  -     Ambulatory referral to Dermatology; Future    Morbid obesity ([facility] [address] )              Return in about 14 weeks (around 7/20/2021)  Subjective:      Patient ID: Luz Jewell is a 50 y o  female  Chief Complaint   Patient presents with    Diabetes     jmcma    Leg Pain     rt side upper inner thigh  Diabetes  She has type 2 diabetes mellitus  No MedicAlert identification noted  The initial diagnosis of diabetes was made 3 years ago  Associated symptoms include polyuria     Leg Pain      Left inner leg pain  Months  Not in groin  No injury recalled  Worse end of day  Better in am  Hard to sleep  Also stairs and walking    Feels she struggles with words  Feels foggy  Since covid  Trouble doing rapid tasks at work    [employer] same  Had bday cake the night before the labwork was done  Not doing smbg since not covered  Has gym to go to now  Diet is pretty good, avoids carbs and flour    Not on diuretic anymore  She will contact Dr Simon Mercedes about her CHF    The following portions of the patient's history were reviewed and updated as appropriate: allergies, current medications, past family history, past medical history, past social history, past surgical history and problem list     Review of Systems   Constitutional: Negative for fever  Respiratory: Negative for shortness of breath  Endocrine: Positive for polyuria  Current Outpatient Medications   Medication Sig Dispense Refill    albuterol (PROAIR HFA) 90 mcg/act inhaler Inhale 1-2 puffs      atorvastatin (LIPITOR) 10 mg tablet Take 1 tablet (10 mg total) by mouth daily 90 tablet 1    carvedilol (COREG) 6 25 mg tablet Take 1 tablet (6 25 mg total) by mouth 2 (two) times a day with meals 180 tablet 2    Empagliflozin (Jardiance) 10 MG TABS Take 1 tablet (10 mg total) by mouth every morning 90 tablet 1    losartan (COZAAR) 25 mg tablet Take 1 tablet (25 mg total) by mouth daily 90 tablet 2    metFORMIN (GLUCOPHAGE-XR) 500 mg 24 hr tablet Take 1 tablet (500 mg total) by mouth 2 (two) times a day with meals As directed 180 tablet 1    sitaGLIPtin (JANUVIA) 100 mg tablet Take 1 tablet (100 mg total) by mouth daily 90 tablet 1    spironolactone (ALDACTONE) 25 mg tablet Take 1 tablet (25 mg total) by mouth daily 90 tablet 2    torsemide (DEMADEX) 10 mg tablet Mon-Frid- Sunday morning (Patient not taking: Reported on 3/10/2021) 45 tablet 0     No current facility-administered medications for this visit  Objective:    /70   Pulse 80   Temp 97 8 °F (36 6 °C)   Resp 20   Ht 5' 9" (1 753 m)   Wt (!) 146 kg (322 lb)   SpO2 95%   BMI 47 55 kg/m²        Physical Exam  Vitals signs and nursing note reviewed  Constitutional:       Appearance: She is well-developed  She is obese  HENT:      Head: Normocephalic  Eyes:      General: No scleral icterus  Conjunctiva/sclera: Conjunctivae normal    Neck:      Musculoskeletal: Neck supple  Cardiovascular:      Rate and Rhythm: Normal rate and regular rhythm  Heart sounds: No murmur     Pulmonary:      Effort: Pulmonary effort is normal  No respiratory distress  Breath sounds: No wheezing  Abdominal:      General: There is no distension  Palpations: Abdomen is soft  Tenderness: There is no abdominal tenderness  Musculoskeletal:         General: Tenderness present  No deformity  Comments: Left thigh adductor tenderness   Skin:     General: Skin is warm and dry  Coloration: Skin is not jaundiced or pale  Findings: No erythema or rash  Neurological:      Mental Status: She is alert  Motor: No weakness  Gait: Gait normal    Psychiatric:         Mood and Affect: Mood normal          Behavior: Behavior normal          Thought Content:  Thought content normal                 Beverly Coleman, DO

## 2021-04-20 ENCOUNTER — OFFICE VISIT (OUTPATIENT)
Dept: OBGYN CLINIC | Facility: CLINIC | Age: 56
End: 2021-04-20
Payer: COMMERCIAL

## 2021-04-20 VITALS
SYSTOLIC BLOOD PRESSURE: 124 MMHG | TEMPERATURE: 96.6 F | DIASTOLIC BLOOD PRESSURE: 82 MMHG | WEIGHT: 293 LBS | HEIGHT: 69 IN | BODY MASS INDEX: 43.4 KG/M2

## 2021-04-20 DIAGNOSIS — B37.3 VAGINAL YEAST INFECTION: Primary | ICD-10-CM

## 2021-04-20 DIAGNOSIS — N94.9 VAGINAL BURNING: ICD-10-CM

## 2021-04-20 LAB
BV WHIFF TEST VAG QL: ABNORMAL
CLUE CELLS SPEC QL WET PREP: ABNORMAL
PH SMN: 4.5 [PH]
SL AMB POCT WET MOUNT: ABNORMAL
T VAGINALIS VAG QL WET PREP: ABNORMAL
YEAST VAG QL WET PREP: ABNORMAL

## 2021-04-20 PROCEDURE — 87210 SMEAR WET MOUNT SALINE/INK: CPT | Performed by: NURSE PRACTITIONER

## 2021-04-20 PROCEDURE — 1036F TOBACCO NON-USER: CPT | Performed by: NURSE PRACTITIONER

## 2021-04-20 PROCEDURE — 99212 OFFICE O/P EST SF 10 MIN: CPT | Performed by: NURSE PRACTITIONER

## 2021-04-20 PROCEDURE — 3079F DIAST BP 80-89 MM HG: CPT | Performed by: NURSE PRACTITIONER

## 2021-04-20 PROCEDURE — 3074F SYST BP LT 130 MM HG: CPT | Performed by: NURSE PRACTITIONER

## 2021-04-20 PROCEDURE — 3008F BODY MASS INDEX DOCD: CPT | Performed by: NURSE PRACTITIONER

## 2021-04-20 RX ORDER — NYSTATIN AND TRIAMCINOLONE ACETONIDE 100000; 1 [USP'U]/G; MG/G
OINTMENT TOPICAL 2 TIMES DAILY
Qty: 30 G | Refills: 0 | Status: SHIPPED | OUTPATIENT
Start: 2021-04-20 | End: 2021-10-15

## 2021-04-20 RX ORDER — FLUCONAZOLE 150 MG/1
150 TABLET ORAL ONCE
Qty: 1 TABLET | Refills: 0 | Status: SHIPPED | OUTPATIENT
Start: 2021-04-20 | End: 2022-01-10 | Stop reason: SDUPTHER

## 2021-04-20 NOTE — PATIENT INSTRUCTIONS
Fluconazole (By mouth)   Fluconazole (xran-DXV-f-zole)  Prevents and treats fungal infections  Brand Name(s): Diflucan   There may be other brand names for this medicine  When This Medicine Should Not Be Used: This medicine is not right for everyone  Do not use it if you had an allergic reaction to fluconazole, or if you are pregnant  How to Use This Medicine:   Liquid, Tablet  · Your doctor will tell you how much medicine to use  Do not use more than directed  · Oral liquid: Shake well just before each use  Measure the oral liquid medicine with a marked measuring spoon, oral syringe, or medicine cup  · Take all of the medicine in your prescription to clear up your infection, even if you feel better after the first few doses  · Read and follow the patient instructions that come with this medicine  Talk to your doctor or pharmacist if you have any questions  · Missed dose: Take a dose as soon as you remember  If it is almost time for your next dose, wait until then and take a regular dose  Do not take extra medicine to make up for a missed dose  · Store the medicine in a closed container at room temperature, away from heat, moisture, and direct light  You may store the oral liquid in the refrigerator or at room temperature for up to 14 days  Do not freeze  Drugs and Foods to Avoid:   Ask your doctor or pharmacist before using any other medicine, including over-the-counter medicines, vitamins, and herbal products  · Do not use this medicine together with astemizole, cisapride, erythromycin, pimozide, quinidine, or terfenadine  · Some medicines can affect how fluconazole works  Tell your doctor of all medicines you are taking, including any of the following:   ? Amiodarone, amphotericin B, astemizole, cimetidine, cyclosporine, halofantrine, midazolam, prednisone, rifabutin, rifampin, sirolimus, tacrolimus, theophylline, tofacitinib, triazolam, vitamin A supplements, voriconazole  ?  Birth control pills  ? Blood pressure medicine (including amlodipine, felodipine, isradipine, losartan, nifedipine)  ? Blood thinner (including warfarin)  ? Cancer medicine (including cyclophosphamide, olaparib, vinblastine, vincristine)  ? Diuretic (water pill, including hydrochlorothiazide)  ? Medicine to lower cholesterol (including atorvastatin, fluvastatin, simvastatin)  ? Medicine to treat depression (including amitriptyline, nortriptyline)  ? Medicine to treat HIV/AIDS (including saquinavir, zidovudine)  ? Medicine to treat seizures (including carbamazepine, phenytoin)  ? Narcotic pain medicine (including alfentanil, fentanyl, methadone)  ? NSAID pain or arthritis medicine (including aspirin, celecoxib, diclofenac, ibuprofen, naproxen)  ? Oral diabetes medicine (including glipizide, glyburide, tolbutamide)  · Check with your doctor before starting any new medicines within 7 days of using this medicine  Warnings While Using This Medicine:   · It is not safe to take this medicine during pregnancy  It could harm an unborn baby  Tell your doctor right away if you become pregnant  Use an effective form of birth control during treatment with this medicine and for at least 1 week after the last dose  · Tell your doctor if you are breastfeeding, or if you have kidney disease, liver disease, heart disease, heart failure, heart rhythm problems, cancer, HIV/AIDS, or hereditary problems  · This medicine may cause the following problems:   ? Liver problems  ? Serious skin reactions  ? Changes in heart rhythm, including QT prolongation  ? Adrenal gland problems  · This medicine may make you dizzy or drowsy  Do not drive or do anything else that could be dangerous until you know how this medicine affects you  · Call your doctor if your symptoms do not improve or if they get worse  · Your doctor will do lab tests at regular visits to check on the effects of this medicine  Keep all appointments    · Keep all medicine out of the reach of children  Never share your medicine with anyone  Possible Side Effects While Using This Medicine:   Call your doctor right away if you notice any of these side effects:  · Allergic reaction: Itching or hives, swelling in your face or hands, swelling or tingling in your mouth or throat, chest tightness, trouble breathing  · Blistering, peeling, or red skin rash  · Changes in skin color, dark freckles, cold feeling, weight loss  · Dark urine or pale stools, nausea, vomiting, loss of appetite, stomach pain, yellow skin or eyes  · Fainting, dizziness, lightheadedness  · Fast, pounding, or uneven heartbeat  · Unusual bleeding, bruising, or weakness  If you notice these less serious side effects, talk with your doctor:   · Headache  · Mild nausea, vomiting, diarrhea  If you notice other side effects that you think are caused by this medicine, tell your doctor  Call your doctor for medical advice about side effects  You may report side effects to FDA at 0-805-FDA-4078  © [address] Information is for End User's use only and may not be sold, redistributed or otherwise used for commercial purposes  The above information is an  only  It is not intended as medical advice for individual conditions or treatments  Talk to your doctor, nurse or pharmacist before following any medical regimen to see if it is safe and effective for you  Nystatin/Triamcinolone (On the skin)   Nystatin (chasity-STAT-in), Triamcinolone (trye-am-SIN-oh-lone)  Treats fungal infections of the skin, especially yeast infections  Brand Name(s):   There may be other brand names for this medicine  When This Medicine Should Not Be Used: You should not use this medicine if you have ever had an allergic reaction to nystatin, triamcinolone, or any cortisone drugs  How to Use This Medicine:   Cream, Ointment  · Your doctor will tell you how much medicine to use and how often    · Put the medicine on the affected area and rub in gently  Keep it out of your eyes  · Do not put a bandage on the area unless your doctor has told you to  · Avoid tight-fitting diapers and plastic pants if using on your child's diaper area  · Wear loose-fitting clothes when using this medicine on the groin area (crotch)  · It is important to use this medicine for as long as your doctor tells you to  If your infection does not begin to clear up after 2 to 3 weeks, or if it gets worse, call your doctor  If a dose is missed:   · Use your medicine as soon as you remember that you have missed your dose  · If it is nearly time for your next dose, wait until then to use the medicine and skip the missed dose  · You should not use two doses at one time  · Try not to miss any doses of this medicine  How to Store and Dispose of This Medicine:   · Keep your medicine at room temperature, away from heat and direct light  Do not freeze  · Ask your pharmacist, doctor, or health caregiver about the best way to dispose of any outdated medicine or medicine no longer needed  · Keep all medicine away from children  Drugs and Foods to Avoid:   Ask your doctor or pharmacist before using any other medicine, including over-the-counter medicines, vitamins, and herbal products  · Make sure your doctor knows if you are using any other medicines or lotions on your skin  · If you are diabetic, ask your doctor before changing your diet or dosage of antidiabetic medicine  Warnings While Using This Medicine:   · If you are pregnant or breastfeeding, talk to your doctor before using this medicine  · If you become pregnant while using this medicine, be sure to tell your doctor  · You should not use more often or for a longer time than your doctor ordered  · Tell your doctor if you have any other medical problems, especially diabetes, herpes, tuberculosis of the skin, vaccina (cowpox), varicella (chickenpox), or other skin infections    · Call your doctor if you have skin irritation that was not there before you started using this medicine  Possible Side Effects While Using This Medicine: If you notice these less serious side effects, talk with your doctor:  · Blistering, burning, itching, or peeling skin  · Break-out of acne (pimples)  · Reddish purple lines on skin  · Easy bruising  If you notice other side effects that you think are caused by this medicine, tell your doctor  Call your doctor for medical advice about side effects  You may report side effects to FDA at 0-749-BOX-8601  © Copyright [address] [place] [address] Drive Information is for End User's use only and may not be sold, redistributed or otherwise used for commercial purposes  The above information is an  only  It is not intended as medical advice for individual conditions or treatments  Talk to your doctor, nurse or pharmacist before following any medical regimen to see if it is safe and effective for you  Yeast Infection   AMBULATORY CARE:   A yeast infection , or vulvovaginal candidiasis, is a common vaginal infection  Vulvovaginal candidiasis is caused by a fungus, or yeast-like germ  Fungi are normally found in your vagina  Too many fungi can cause an infection  Contact your healthcare provider if:   · You have fever and chills  · You develop abdominal or pelvic pain  · Your discharge is bloody and it is not your monthly period  · Your signs and symptoms get worse, even after treatment  · You have questions or concerns about your condition or care  Signs and symptoms:   · Thick, white, cheese-like discharge from your vagina    · Itching, swelling, and redness in your vagina    · Burning when you urinate    Treatment for a yeast infection  includes medicines to treat the fungal infection and decrease inflammation  The medicine may be a pill, cream, ointment, or vaginal tablet or suppository  Keep your vagina healthy:   · Do not have sex until your symptoms go away    Have your partner wear a condom until you complete your course of medication  · Always wipe from front to back  after you use the toilet  This prevents spreading bacteria from your rectal area into your vagina  · Clean around your vulva with mild soap and warm water each day  Gently dry the area after washing  Do not use hot tubs  The heat and moisture from hot tubs can increase your risk for another yeast infection  · Do not wear tight-fitting clothes or undergarments  for long periods  Wear cotton underwear during the day  Cotton helps keep your genital area dry and does not hold in warmth or moisture  Do not wear underwear at night  · Change your laundry soap or fabric softener  if you think it is irritating your skin  · Do not douche  or use feminine hygiene sprays or bubble bath  Do not use pads or tampons that are scented, or colored or perfumed toilet paper  · Ask your healthcare provider about birth control options if necessary  Condoms have latex and diaphragms have gel that kills sperm  Both of these may irritate your genital area  Follow up with your healthcare provider as directed:  Write down your questions so you remember to ask them during your visits  © Copyright [address] [place] [address] Drive Information is for End User's use only and may not be sold, redistributed or otherwise used for commercial purposes  All illustrations and images included in CareNotes® are the copyrighted property of [employer]  or [employer]  The above information is an  only  It is not intended as medical advice for individual conditions or treatments  Talk to your doctor, nurse or pharmacist before following any medical regimen to see if it is safe and effective for you

## 2021-04-20 NOTE — PROGRESS NOTES
Assessment/Plan:      There are no diagnoses linked to this encounter       - reviewed diagnosis of  Vaginal yeast infection and treatment with  Fluconazole and Mycolog cream  Written information provided   -  Encouraged to avoid douching, scented soaps, lotions and lubricants, avoid tight / restrictive clothing  - signs and symptoms to report reviewed    RTO 2021 for annual exam     Subjective:     Patient ID: Charo Bo is a 50 y o  female  HPI  here with complaints of  Vaginal discharge, itching and burning for  7-10 days  Discharge is described as creamy white  Burning is both  Internal and external   Itching is mainly external   Alleviating factors include a cold washcloth  Aggravating factors include being hot sweaty, touch  Has tried over-the-counter 3 day   Miconazole with no relief  Last pap smear 20 resulted NILM/ HR HPV(-)  Last mammogram 10/27/20 BiRads 1      Review of Systems   Constitutional: Negative for chills, fatigue and fever  Respiratory: Negative  Cardiovascular: Negative  Objective:  /82 (BP Location: Right arm, Patient Position: Sitting, Cuff Size: Extra-Large)   Temp (!) 96 6 °F (35 9 °C) (Temporal)   Ht 5' 9" (1 753 m)   Wt (!) 145 kg (319 lb)   LMP 2020   BMI 47 11 kg/m²        Physical Exam  Vitals signs reviewed  Exam conducted with a chaperone present  Constitutional:       Appearance: Normal appearance  Cardiovascular:      Rate and Rhythm: Normal rate and regular rhythm  Pulmonary:      Effort: Pulmonary effort is normal       Breath sounds: Normal breath sounds  Genitourinary:     Labia:         Right: Tenderness present  Left: Tenderness present  Vagina: Vaginal discharge present  No tenderness or lesions  Cervix: Normal       Uterus: Normal        Comments:  Bilateral labia with erythema, swelling    Moderate amount of thick white vaginal discharge adherent to vaginal walls noted on exam  Neurological: Mental Status: She is alert and oriented to person, place, and time     Psychiatric:         Mood and Affect: Mood normal          Behavior: Behavior normal

## 2021-05-14 DIAGNOSIS — E11.8 TYPE 2 DIABETES MELLITUS WITH COMPLICATION, WITHOUT LONG-TERM CURRENT USE OF INSULIN (HCC): ICD-10-CM

## 2021-05-14 RX ORDER — EMPAGLIFLOZIN 10 MG/1
TABLET, FILM COATED ORAL
Qty: 90 TABLET | Refills: 1 | Status: SHIPPED | OUTPATIENT
Start: 2021-05-14 | End: 2021-11-10

## 2021-05-19 ENCOUNTER — TELEPHONE (OUTPATIENT)
Dept: FAMILY MEDICINE CLINIC | Facility: CLINIC | Age: 56
End: 2021-05-19

## 2021-05-19 DIAGNOSIS — E11.8 TYPE 2 DIABETES MELLITUS WITH COMPLICATION, WITHOUT LONG-TERM CURRENT USE OF INSULIN (HCC): ICD-10-CM

## 2021-05-19 RX ORDER — METFORMIN HYDROCHLORIDE 500 MG/1
500 TABLET, EXTENDED RELEASE ORAL 2 TIMES DAILY WITH MEALS
Qty: 60 TABLET | Refills: 0 | Status: SHIPPED | OUTPATIENT
Start: 2021-05-19 | End: 2021-06-24

## 2021-05-25 ENCOUNTER — TELEPHONE (OUTPATIENT)
Dept: OBGYN CLINIC | Facility: CLINIC | Age: 56
End: 2021-05-25

## 2021-05-25 DIAGNOSIS — B37.3 VAGINAL YEAST INFECTION: Primary | ICD-10-CM

## 2021-05-25 RX ORDER — FLUCONAZOLE 150 MG/1
150 TABLET ORAL 2 TIMES WEEKLY
Qty: 2 TABLET | Refills: 0 | Status: SHIPPED | OUTPATIENT
Start: 2021-05-27 | End: 2021-06-01

## 2021-05-25 NOTE — TELEPHONE ENCOUNTER
T/c from patient c/o vaginal itching and possible yeast infection , states she had these SX last month and was seen by Irena Figueroa and treated is requested to be treated again as she is in the process of discuss reoccurrent yeast infection since starting a new medication RX'd by her PCP

## 2021-06-04 ENCOUNTER — TELEPHONE (OUTPATIENT)
Dept: CARDIOLOGY CLINIC | Facility: CLINIC | Age: 56
End: 2021-06-04

## 2021-06-04 DIAGNOSIS — I44.7 LBBB (LEFT BUNDLE BRANCH BLOCK): ICD-10-CM

## 2021-06-04 DIAGNOSIS — R60.0 BILATERAL LEG EDEMA: ICD-10-CM

## 2021-06-04 RX ORDER — TORSEMIDE 10 MG/1
TABLET ORAL
Qty: 45 TABLET | Refills: 0
Start: 2021-06-04 | End: 2021-06-10 | Stop reason: SDUPTHER

## 2021-06-10 RX ORDER — TORSEMIDE 10 MG/1
TABLET ORAL
Qty: 45 TABLET | Refills: 1 | Status: SHIPPED | OUTPATIENT
Start: 2021-06-10

## 2021-06-16 ENCOUNTER — OFFICE VISIT (OUTPATIENT)
Dept: CARDIOLOGY CLINIC | Facility: CLINIC | Age: 56
End: 2021-06-16
Payer: COMMERCIAL

## 2021-06-16 VITALS
TEMPERATURE: 97.9 F | BODY MASS INDEX: 43.4 KG/M2 | SYSTOLIC BLOOD PRESSURE: 118 MMHG | DIASTOLIC BLOOD PRESSURE: 68 MMHG | HEART RATE: 76 BPM | WEIGHT: 293 LBS | OXYGEN SATURATION: 98 % | HEIGHT: 69 IN

## 2021-06-16 DIAGNOSIS — I10 BENIGN ESSENTIAL HYPERTENSION: ICD-10-CM

## 2021-06-16 DIAGNOSIS — I44.7 LBBB (LEFT BUNDLE BRANCH BLOCK): Primary | ICD-10-CM

## 2021-06-16 DIAGNOSIS — E78.49 OTHER HYPERLIPIDEMIA: ICD-10-CM

## 2021-06-16 PROCEDURE — 3008F BODY MASS INDEX DOCD: CPT | Performed by: INTERNAL MEDICINE

## 2021-06-16 PROCEDURE — 99214 OFFICE O/P EST MOD 30 MIN: CPT | Performed by: INTERNAL MEDICINE

## 2021-06-16 PROCEDURE — 1036F TOBACCO NON-USER: CPT | Performed by: INTERNAL MEDICINE

## 2021-06-16 PROCEDURE — 93000 ELECTROCARDIOGRAM COMPLETE: CPT | Performed by: INTERNAL MEDICINE

## 2021-06-16 PROCEDURE — 3078F DIAST BP <80 MM HG: CPT | Performed by: INTERNAL MEDICINE

## 2021-06-16 PROCEDURE — 3074F SYST BP LT 130 MM HG: CPT | Performed by: INTERNAL MEDICINE

## 2021-06-16 RX ORDER — SACUBITRIL AND VALSARTAN 24; 26 MG/1; MG/1
TABLET, FILM COATED ORAL
Qty: 180 TABLET | Refills: 1 | Status: SHIPPED | OUTPATIENT
Start: 2021-06-16 | End: 2022-03-04

## 2021-06-16 NOTE — PATIENT INSTRUCTIONS
Potassium Content of Foods List   WHAT YOU NEED TO KNOW:   Potassium is a mineral that is found in most foods  Potassium helps to balance fluids and minerals in your body  It also helps your body maintain a normal blood pressure  Potassium helps your muscles contract and your nerves function normally  DISCHARGE INSTRUCTIONS:   Why you may need to change the amount of potassium you eat:   · You may need more potassium  if you have hypokalemia (low potassium levels) or high blood pressure  You may also need more potassium if you are taking diuretics  Diuretics and certain medicines cause your body to lose potassium  · You may need less potassium  in your diet if you have hyperkalemia (high potassium levels) or kidney disease  Potassium content of fruit:  The amount of potassium in milligrams (mg) contained in each fruit or serving of fruit is listed beside the item  · High-potassium foods (more than 200 mg per serving):      ? 1 medium banana (425)    ? ½ of a papaya (390)    ? ½ cup of prune juice (370)    ? ¼ cup of raisins (270)    ? 1 medium van (325) or kiwi (240)    ? 1 small orange (240) or ½ cup of orange juice (235)    ? ½ cup of cubed cantaloupe (215) or diced honeydew melon (200)    ? 1 medium pear (200)    · Medium-potassium foods (50 to 200 mg per serving):      ? 1 medium peach (185)    ? 1 small apple or ½ cup of apple juice (150)    ? ½ cup of peaches canned in juice (120)    ? ½ cup of canned pineapple (100)    ? ½ cup of fresh, sliced strawberries (830)    ? ½ cup of watermelon (85)    · Low-potassium foods (less than 50 mg per serving):      ? ½ cup of cranberries (45) or cranberry juice cocktail (20)    ? ½ cup of nectar of papaya, van, or pear (35)    Potassium content of vegetables:   · High-potassium foods (more than 200 mg per serving):      ? 1 medium baked potato, with skin (925)    ? 1 baked medium sweet potato, with skin (450)    ?  ½ cup of tomato or vegetable juice (275), or 1 medium raw tomato (290)    ? ½ cup of mushrooms (280)    ? ½ cup of fresh brussels sprouts (250)    ? ½ cup of cooked zucchini (220) or winter squash (250)    ? ¼ of a medium avocado (245)    ? ½ cup of broccoli (230)    · Medium-potassium foods (50 to 200 mg per serving):      ? ½ cup of corn (195)    ? ½ cup of fresh or cooked carrots (180)    ? ½ cup of fresh cauliflower (150)    ? ½ cup of asparagus (155)    ? ½ cup of canned peas (90)     ? 1 cup of lettuce, all types (100)    ? ½ cup of fresh green beans (90)    ? ½ cup of frozen green beans (85)    ? ½ cup of cucumber (80)    Potassium content of protein foods:   · High-potassium foods (more than 200 mg per serving):      ? ½ cup of cooked britt beans (400) or lentils (365)    ? 1 cup of soy milk (300)    ? 3 ounces of baked or broiled salmon (319)    ? 3 ounces of roasted turkey, dark meat (250)    ? ¼ cup of sunflower seeds (241)    ? 3 ounces of cooked lean beef (224)    ? 2 tablespoons of smooth peanut butter (210)    · Medium-potassium foods (50 to 200 mg per serving):      ? 1 ounce of salted peanuts, almonds, or cashews (200)    ? 1 large egg (60 mg)    Potassium content of dairy foods:   · High-potassium foods (more than 200 mg per serving):      ? 6 ounces of yogurt (260 to 435)    ? 1 cup of nonfat, low-fat, or whole milk (350 to 380)    · Medium-potassium foods (50 to 200 mg per serving):      ? ½ cup of ricotta cheese (154)    ? ½ cup of vanilla ice cream (131)    ?  ½ cup of low-fat (2%) cottage cheese (110)    · Low-potassium foods (less than 50 mg per serving):      ? 1 ounce of cheese (20 to 30)      Potassium content of grains:   · 1 slice of white bread (30)    · ½ cup of white or brown rice (50)    · ½ cup of spaghetti or macaroni (30)    · 1 flour or corn tortilla (50)    · 1 four-inch waffle (50)    Potassium content of other foods:   · 1 tablespoon of molasses (295)    · 1½ ounces of chocolate (165)    · Some salt substitutes may contain a high amount of potassium  Check the food label to find the amount of potassium it contains  © Copyright [address] Information is for End User's use only and may not be sold, redistributed or otherwise used for commercial purposes  All illustrations and images included in CareNotes® are the copyrighted property of A [employer] M , Inc  or [employer]   The above information is an  only  It is not intended as medical advice for individual conditions or treatments  Talk to your doctor, nurse or pharmacist before following any medical regimen to see if it is safe and effective for you

## 2021-06-16 NOTE — PROGRESS NOTES
Cardiology Follow Up  Danny Goodson  1965  [id number]  [address] [facility] [address]    1  LBBB (left bundle branch block)  POCT ECG    sacubitril-valsartan (Entresto) 24-26 MG TABS   2  Benign essential hypertension  POCT ECG   3  Other hyperlipidemia  POCT ECG    sacubitril-valsartan (Entresto) 24-26 MG TABS     1  Chronic systolic heart failure NYHA class 2 nonischemic EF40%- compensated on exam  Continue low salt diet + exercise  Continue carvedilol 6 25mg ++ aldactone 25mg  We will try and switch her from losartan to [facility]  I discussed with her that [facility] has both mortality and morbidity benefit in patients with chronic heart dysfunction  She will watch her potassium intake  She will try and avoid bananas, excessive high potassium foods such as tomatoes, potatoes and spinach  She will obtain lab work in 2 month with her PCP  She will need periodic monitoring of her potassium and kidney function  At least twice a year  2  Dm2-  6 6 on last tight control   jardiance Will follow-up with pcp asap    3  ARMANDO- weight loss  Will consider tonsillectomy    4  LBBB- old  Likely from previous dilated cm/hf    5  Hld- atorvastatin    6  Weight loss/bmi47-    Rtc- six months     Discussion/Plan:  Initail visit: 53 yo pleasant woman hx morbid obesity, nonischemic systolic heart failure [id number]%, LBBB, sleep apnea presents for follow-up  Lower extremity edema has signifcantly improved but remains  Initially improved but stopped torsemide for a couple of days  Has had diarrhea possibly secondary to metformin on lisinopril  Lisinopril held and reports continued diarrhea? Denies having chest pain  Shortness of breath improved  Just received CPAP and is about to start using it 6/23: She has not been compliant with her CPAP device secondary to severe claustrophobia   She is using her diuretic about once or twice a week  Her weight has been stable  She denies having any exertional chest heaviness  She denies having significant palpitations  She denies having any PND or orthopnea  She continues to have diarrhea with metformin usage but her sugars have been improved  She is not very active and does not have an exercise program due to lack of time  She is watching her salt intake  2/8/18: She has not had significant edema in her legs  Her weight has been stable  She is using medications without dizziness  She is using the torsemide once every 11-12 days  Slipped on ice  Denies having shortness of breath on exertion  Not using CPAP  Pending tonsillectomy in the summer      10/06/2020:  She is compliant with her medications  Her lower extremity edema has been stable  She reports having increased diuresis which is affecting her quality of life  She is starting [place]  We will cut back on her torsemide  She denies having significant chest pain  She denies feeling dizziness or lightheadedness  06/16/2021:  She denies having recurrence of lower extremity swelling  She has had periodically take torsemide  Her sugars are better controlled  She is sedentary  She has not been exercising  She denies having chest heaviness  She denies having major palpitations        Interval History:    Patient Active Problem List   Diagnosis    ARMANDO (obstructive sleep apnea)    Morbid obesity ([facility])    Bilateral leg edema    LBBB (left bundle branch block)    Screening for cardiovascular, respiratory, and genitourinary diseases    Benign essential hypertension    Chronic eczema    CKD stage 1 due to type 2 diabetes mellitus ([facility] [address] )    Degenerative arthritis of thoracic spine    Generalized anxiety disorder    Heart failure, left, with LVEF 41-49% ([facility])    Low back pain    Type 2 diabetes mellitus with complication, without long-term current use of insulin ([facility])    Allergic rhinitis due to allergen    Gynecologic exam normal    Colon cancer screening    Other hyperlipidemia    Rash    Breast cancer screening    Immunization due    Routine gynecological examination    Healthcare maintenance    COVID-19 virus infection    History of 2019 novel coronavirus disease (COVID-19)    Strain of groin, left, initial encounter    Word finding difficulty    Memory difficulties    Skin lesion     Past Medical History:   Diagnosis Date    Achilles tendinitis, unspecified leg 12/14/2006    Anxiety     Arthritis     Last Assessed: 9/28/2017    Asthma     Last Assessed: 9/28/2017    Bilateral leg edema     BMI 50 0-59 9, adult ([facility] )     Breast lump 07/11/2008    Chest pain on breathing     Last Assessed: 3/9/2014    Chronic kidney disease, stage 1     Last Assessed: 3/1/2014    Clotting disorder ([facility] )     Diabetes mellitus ([facility] )     Last Assessed: 9/28/2017 Type 2 with renal manifestations, controlled    Eczema     Environmental allergies     Last Assessed: 9/28/2017    Exposure to potentially hazardous body fluids     Last Assessed: 4/10/2015    Heart failure, left, with LVEF 41-49% ([facility] )     Hypertension 05/30/2012    Benign Essential    Infectious mononucleosis     Resolved: 8/19/2015    Lateral epicondylitis 02/17/2011    unspecified laterality    LBBB (left bundle branch block)     LBBB (left bundle branch block) 2/8/2018    Lipoma     Last Assessesd: 10/13/2014    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    Psychiatric disorder     Sialodochitis 06/01/2010    Situational anxiety     Last Assessed: 10/22/2015    Sleep apnea     Tongue disorder     Last Assessed: 6/9/2016    Type 2 diabetes mellitus with kidney complication, without long-term current use of insulin (HCC)      Social History     Socioeconomic History    Marital status: Legally      Spouse name: Not on file    Number of children: 2    Years of education: Not on file    Highest education level: Not on file Occupational History    Not on file   Tobacco Use    Smoking status: Never Smoker    Smokeless tobacco: Never Used    Tobacco comment: N/a   Vaping Use    Vaping Use: Never used   Substance and Sexual Activity    Alcohol use: No    Drug use: Never    Sexual activity: Yes     Partners: Male     Comment: pt  requests std testing   Other Topics Concern    Not on file   Social History Narrative    Employed    Lack of adequate sleep    Lack of exercise    Pets: dog     Social Determinants of Health     Financial Resource Strain:     Difficulty of Paying Living Expenses:    Food Insecurity:     Worried About Running Out of Food in the Last Year:     Ran Out of Food in the Last Year:    Transportation Needs:     Lack of Transportation (Medical):      Lack of Transportation (Non-Medical):    Physical Activity:     Days of Exercise per Week:     Minutes of Exercise per Session:    Stress:     Feeling of Stress :    Social Connections:     Frequency of Communication with Friends and Family:     Frequency of Social Gatherings with Friends and Family:     Attends Sikhism Services:     Active Member of Clubs or Organizations:     Attends Club or Organization Meetings:     Marital Status:    Intimate Partner Violence:     Fear of Current or Ex-Partner:     Emotionally Abused:     Physically Abused:     Sexually Abused:       Family History   Problem Relation Age of Onset    Heart attack Mother     Diabetes type II Mother     Heart disease Father     Atrial fibrillation Father     Arthritis Father     Arthritis Sister     Other Other         Cardiac Disorder    Arthritis Family     Breast cancer Maternal Grandmother 61    Heart disease Maternal Grandmother     Bone cancer Maternal Grandfather     Brain cancer Paternal Grandfather     No Known Problems Maternal Aunt      Past Surgical History:   Procedure Laterality Date    CERVICAL BIOPSY  W/ LOOP ELECTRODE EXCISION  1992    CHOLECYSTECTOMY      COLONOSCOPY  1997    ESSURE TUBAL LIGATION  2010    GALLBLADDER SURGERY  1996    GYNECOLOGIC CRYOSURGERY  1992       Current Outpatient Medications:     albuterol (PROAIR HFA) 90 mcg/act inhaler, Inhale 1-2 puffs, Disp: , Rfl:     atorvastatin (LIPITOR) 10 mg tablet, Take 1 tablet (10 mg total) by mouth daily, Disp: 90 tablet, Rfl: 1    carvedilol (COREG) 6 25 mg tablet, Take 1 tablet (6 25 mg total) by mouth 2 (two) times a day with meals, Disp: 180 tablet, Rfl: 2    Jardiance 10 MG TABS, TAKE 1 TABLET EVERY MORNING, Disp: 90 tablet, Rfl: 1    metFORMIN (GLUCOPHAGE-XR) 500 mg 24 hr tablet, Take 1 tablet (500 mg total) by mouth 2 (two) times a day with meals As directed, Disp: 60 tablet, Rfl: 0    sitaGLIPtin (JANUVIA) 100 mg tablet, Take 1 tablet (100 mg total) by mouth daily, Disp: 90 tablet, Rfl: 1    spironolactone (ALDACTONE) 25 mg tablet, Take 1 tablet (25 mg total) by mouth daily, Disp: 90 tablet, Rfl: 2    torsemide (DEMADEX) 10 mg tablet, Once a day on Mon-Friday- Sunday morning, Disp: 45 tablet, Rfl: 1    nystatin-triamcinolone (MYCOLOG-II) ointment, Apply topically 2 (two) times a day (Patient not taking: Reported on 6/16/2021), Disp: 30 g, Rfl: 0    sacubitril-valsartan (Entresto) 24-26 MG TABS, Take one tablet a day for one week than increase to one tablet twice a day, Disp: 180 tablet, Rfl: 1  Allergies   Allergen Reactions    Vioxx [Rofecoxib] Swelling       Review of Systems:  Review of Systems   Constitutional: Negative  Negative for activity change, appetite change, chills, diaphoresis, fatigue, fever and unexpected weight change  HENT: Negative  Negative for congestion, dental problem, drooling, ear discharge, ear pain, facial swelling, hearing loss, mouth sores, nosebleeds, postnasal drip, rhinorrhea, sinus pain, sinus pressure, sneezing, sore throat, tinnitus, trouble swallowing and voice change  Eyes: Negative    Negative for photophobia, pain, redness, itching and visual disturbance  Respiratory: Negative  Negative for apnea, cough, choking, chest tightness, shortness of breath, wheezing and stridor  Cardiovascular: Negative  Negative for chest pain, palpitations and leg swelling  Gastrointestinal: Negative  Negative for abdominal distention, abdominal pain, anal bleeding, blood in stool, constipation, diarrhea, nausea, rectal pain and vomiting  Endocrine: Negative  Negative for cold intolerance, heat intolerance, polydipsia, polyphagia and polyuria  Genitourinary: Negative  Negative for decreased urine volume, difficulty urinating, dyspareunia, dysuria, enuresis, flank pain, frequency, genital sores, hematuria, menstrual problem, pelvic pain, urgency, vaginal bleeding, vaginal discharge and vaginal pain  Musculoskeletal: Negative  Negative for arthralgias, back pain, gait problem, joint swelling, myalgias, neck pain and neck stiffness  Skin: Negative  Negative for color change, pallor, rash and wound  Allergic/Immunologic: Negative  Negative for environmental allergies, food allergies and immunocompromised state  Neurological: Negative  Negative for dizziness, tremors, seizures, syncope, facial asymmetry, speech difficulty, weakness, light-headedness, numbness and headaches  Hematological: Negative  Negative for adenopathy  Does not bruise/bleed easily  Psychiatric/Behavioral: Positive for sleep disturbance  Negative for agitation, behavioral problems, confusion, decreased concentration, dysphoric mood, hallucinations, self-injury and suicidal ideas  The patient is not nervous/anxious and is not hyperactive  All other systems reviewed and are negative        Vitals:    06/16/21 1635   BP: 118/68   BP Location: Left arm   Patient Position: Sitting   Cuff Size: Large   Pulse: 76   Temp: 97 9 °F (36 6 °C)   SpO2: 98%   Weight: (!) 145 kg (319 lb)   Height: 5' 9" (1 753 m)     Physical Exam:  Physical Exam   Constitutional: She is oriented to person, place, and time  No distress  Obesity, pleasant   HENT:   Head: Normocephalic and atraumatic  Right Ear: External ear normal    Left Ear: External ear normal    Eyes: Conjunctivae are normal  Pupils are equal, round, and reactive to light  Right eye exhibits no discharge  Left eye exhibits no discharge  No scleral icterus  Neck: Normal range of motion  Neck supple  No JVD present  No tracheal deviation present  No thyromegaly present  Cardiovascular: Normal rate and regular rhythm  Exam reveals gallop  Exam reveals no friction rub  No murmur heard  Pulmonary/Chest: Effort normal and breath sounds normal  No stridor  No respiratory distress  She has no wheezes  She has no rales  She exhibits no tenderness  Abdominal: Soft  Bowel sounds are normal  She exhibits no distension and no mass  There is no tenderness  There is no rebound and no guarding  Musculoskeletal: Normal range of motion  She exhibits no edema, tenderness or deformity  Neurological: She is alert and oriented to person, place, and time  She has normal reflexes  No cranial nerve deficit  She exhibits normal muscle tone  Coordination normal    Skin: Skin is warm and dry  No rash noted  She is not diaphoretic  No erythema  No pallor  Psychiatric: She has a normal mood and affect  Her behavior is normal  Judgment and thought content normal        Labs:   CBC with diff:      Invalid input(s):  RBC, TOTALCELLSCOUNTED, SEGS%, GRANS%, LYMPHS%, EOS%, BASO%, ABNEUT, ABGRANS, ABLYMPHS, ABMOMOS, ABEOS, ABBASO    CMP:      Invalid input(s): ALBUMIN    Magnesium:    Coags:    TSH:    Lipid Profile:    Hgb A1c:    NT-proBNP: No results for input(s): NTBNP in the last 72 hours  Imaging & Testing   I have personally reviewed pertinent reports  EKG: Personally reviewed      Normal sinus rhythm LBBB    Cardiac testing:   Results for orders placed during the hospital encounter of 01/04/17   Echo complete with contrast if indicated    Narrative [address]  [address] [facility]  [address]  [phone]    Transthoracic Echocardiogram  2D, M-mode, Doppler, and Color Doppler    Study date:  2017    Patient: Kaleb Strauss  MR number: [id number]  Account number: [de-identified]  : 1965  Age: 54 years  Gender: Female  Status: Routine  Location: Echo lab  Height: 69 in  Weight: 348  3 lb  BP: 128/ 74 mmHg    Indications: Heart Failure    Diagnoses: 428 9 - HEART FAILURE NOS    Sonographer:  Emre Tinajero  Primary Physician:  Chela Quinteros DO  Referring Physician:  Dev Moreno MD  Group:  Colin Hardin  RN:  JEANNETTE Paul  Interpreting Physician:  Farzana Ruff MD    SUMMARY    LEFT VENTRICLE:  The ventricle was mildly dilated  Systolic function was mildly to moderately reduced  Ejection fraction was  estimated in the range of 35 % to 45 % to be 40 %  difficult to assess accurate  EF due to limited study and paradoxical septal motion, consider MUGA or Cardiac  MRI as confirmation  There was mild diffuse hypokinesis with paradoxical septal motion  Wall thickness was mildly increased  There was mild concentric hypertrophy  RIGHT VENTRICLE:  The tricuspid jet envelope definition was inadequate for estimation of RV  systolic pressure  There are no indirect findings (abnormal RV volume or  geometry, altered pulmonary flow velocity profile, or leftward septal  displacement) which would suggest moderate or severe pulmonary hypertension  LEFT ATRIUM:  The atrium was mildly dilated  MITRAL VALVE:  There was trace regurgitation  HISTORY: PRIOR HISTORY: HTN, DM, Anxiety, Asthma, Arthritis    PROCEDURE: The procedure was performed in the echo lab  This was a routine  study  The transthoracic approach was used  The study included complete 2D  imaging, M-mode, complete spectral Doppler, and color Doppler  The heart rate  was 90 bpm, at the start of the study   Intravenous contrast (Definity solution  [1 3 ml Definity/8 7ml normal saline solution], 2 ml) was administered to  opacify the left ventricle  Intravenous contrast ( 1 ml) was administered  Echocardiographic views were limited due to poor acoustic window availability,  decreased penetration, and lung interference  This was a technically difficult  study  LEFT VENTRICLE: The ventricle was mildly dilated  Systolic function was mildly  to moderately reduced  Ejection fraction was estimated in the range of 35 % to  45 % to be 40 %  difficult to assess accurate EF due to limited study and  paradoxical septal motion, consider MUGA or Cardiac MRI as confirmation There  was mild diffuse hypokinesis with paradoxical septal motion  Wall thickness was  mildly increased  There was mild concentric hypertrophy  DOPPLER: Left  ventricular diastolic function parameters were normal for the patient's age  RIGHT VENTRICLE: The size was normal  Systolic function was normal  DOPPLER:  The tricuspid jet envelope definition was inadequate for estimation of RV  systolic pressure  There are no indirect findings (abnormal RV volume or  geometry, altered pulmonary flow velocity profile, or leftward septal  displacement) which would suggest moderate or severe pulmonary hypertension  LEFT ATRIUM: The atrium was mildly dilated  RIGHT ATRIUM: Size was normal     MITRAL VALVE: Valve structure was normal  There was normal leaflet separation  DOPPLER: The transmitral velocity was within the normal range  There was no  evidence for stenosis  There was trace regurgitation  AORTIC VALVE: The valve was trileaflet  Leaflets exhibited normal thickness and  normal cuspal separation  DOPPLER: Transaortic velocity was within the normal  range  There was no evidence for stenosis  There was no regurgitation  TRICUSPID VALVE: DOPPLER: There was no significant regurgitation  PERICARDIUM: There was no thickening or calcification   There was no pericardial  effusion  AORTA: The root exhibited normal size  SYSTEMIC VEINS: IVC: The inferior vena cava was not well visualized  SYSTEM MEASUREMENT TABLES    2D mode  AoR Diam 2D: 3 3 cm  LA Diam (2D): 4 9 cm  LA/Ao (2D): 1 48  FS (2D Teich): 15 2 %  IVSd (2D): 1 18 cm  LVDEV: 170 cm³  LVESV: 116 cm³  LVIDd(2D): 5 85 cm  LVISd (2D): 4 96 cm  LVPWd (2D): 1 18 cm  SV (Teich): 54 cm³    Apical four chamber  LVEF A4C: 33 %    Apical two chamber  LA Area: 26 8 cm squared  LA Volume: 91 cm³    Unspecified Scan Mode  MV Peak A Dejan: 563 mm/s  MV Peak E Dejan  Mean: 1110 mm/s  MVA (PHT): 4 89 cm squared  PHT: 45 ms  Max P mm[Hg]  V Max: 2590 mm/s  Vmax: 2610 mm/s  RA Area: 13 7 cm squared  RA Volume: 35 4 cm³  TAPSE: 2 cm    [facility]    Prepared and electronically signed by    Arianna Sarmiento MD  Signed 2017 11:04:22       10/6/20: normal sinus rhythm LBBB qrs [address] [place] Lolita OZUNA [address] [facility]  Please call with any questions or suggestions    A description of the counseling:   Goals and Barriers:  Patient's ability to self care:  Medication side effect reviewed with patient in detail and all their questions answered  "This note has been constructed using a voice recognition system  Therefore there may be syntax, spelling, and/or grammatical errors   Please call if you have any questions  "

## 2021-06-23 DIAGNOSIS — E11.8 TYPE 2 DIABETES MELLITUS WITH COMPLICATION, WITHOUT LONG-TERM CURRENT USE OF INSULIN (HCC): ICD-10-CM

## 2021-06-24 RX ORDER — METFORMIN HYDROCHLORIDE 500 MG/1
TABLET, EXTENDED RELEASE ORAL
Qty: 60 TABLET | Refills: 0 | Status: SHIPPED | OUTPATIENT
Start: 2021-06-24 | End: 2021-08-02

## 2021-07-14 ENCOUNTER — RA CDI HCC (OUTPATIENT)
Dept: OTHER | Facility: HOSPITAL | Age: 56
End: 2021-07-14

## 2021-07-14 NOTE — PROGRESS NOTES
[place] [address]  coding opportunities             Chart reviewed, (number of) suggestions sent to provider: 1     Problem listed updated  Provider Accepted, (number of) suggestions accepted: 1               Patients insurance company: [employer] (Medicare Advantage and Commercial)           [place] [address]  coding opportunities             Chart reviewed, (number of) suggestions sent to provider: 1                  Patients insurance company: [employer])           I  [place] [address]  coding opportunities             Chart reviewed, (number of) suggestions sent to provider: 1     Problem listed updated   Provider Accepted, (number of) suggestions accepted: 1               Patients insurance company: [employer] (Medicare Advantage and Commercial)     Visit status: Patient canceled the appointment        13 0 : Hypertensive heart and chronic kidney disease with heart failure and stage 1 through stage 4 chronic kidney disease, or unspecified chronic kidney disease ([place] [address] )

## 2021-07-31 DIAGNOSIS — E78.49 OTHER HYPERLIPIDEMIA: ICD-10-CM

## 2021-08-02 DIAGNOSIS — E11.8 TYPE 2 DIABETES MELLITUS WITH COMPLICATION, WITHOUT LONG-TERM CURRENT USE OF INSULIN (HCC): ICD-10-CM

## 2021-08-02 LAB
ALBUMIN SERPL-MCNC: 4.4 G/DL (ref 3.8–4.9)
ALBUMIN/GLOB SERPL: 1.3 {RATIO} (ref 1.2–2.2)
ALP SERPL-CCNC: 98 IU/L (ref 48–121)
ALT SERPL-CCNC: 16 IU/L (ref 0–32)
AST SERPL-CCNC: 12 IU/L (ref 0–40)
BILIRUB SERPL-MCNC: 0.5 MG/DL (ref 0–1.2)
BUN SERPL-MCNC: 19 MG/DL (ref 6–24)
BUN/CREAT SERPL: 25 (ref 9–23)
CALCIUM SERPL-MCNC: 9.6 MG/DL (ref 8.7–10.2)
CHLORIDE SERPL-SCNC: 100 MMOL/L (ref 96–106)
CO2 SERPL-SCNC: 25 MMOL/L (ref 20–29)
CREAT SERPL-MCNC: 0.75 MG/DL (ref 0.57–1)
GLOBULIN SER-MCNC: 3.3 G/DL (ref 1.5–4.5)
GLUCOSE SERPL-MCNC: 135 MG/DL (ref 65–99)
HBA1C MFR BLD: 6.7 % (ref 4.8–5.6)
POTASSIUM SERPL-SCNC: 4.5 MMOL/L (ref 3.5–5.2)
PROT SERPL-MCNC: 7.7 G/DL (ref 6–8.5)
SL AMB EGFR AFRICAN AMERICAN: 105 ML/MIN/1.73
SL AMB EGFR NON AFRICAN AMERICAN: 91 ML/MIN/1.73
SODIUM SERPL-SCNC: 138 MMOL/L (ref 134–144)

## 2021-08-02 PROCEDURE — 3044F HG A1C LEVEL LT 7.0%: CPT | Performed by: INTERNAL MEDICINE

## 2021-08-02 RX ORDER — METFORMIN HYDROCHLORIDE 500 MG/1
TABLET, EXTENDED RELEASE ORAL
Qty: 60 TABLET | Refills: 2 | Status: SHIPPED | OUTPATIENT
Start: 2021-08-02 | End: 2021-11-16

## 2021-08-02 RX ORDER — ATORVASTATIN CALCIUM 10 MG/1
TABLET, FILM COATED ORAL
Qty: 90 TABLET | Refills: 0 | Status: SHIPPED | OUTPATIENT
Start: 2021-08-02 | End: 2021-10-28

## 2021-08-03 ENCOUNTER — RA CDI HCC (OUTPATIENT)
Dept: OTHER | Facility: HOSPITAL | Age: 56
End: 2021-08-03

## 2021-08-03 NOTE — PROGRESS NOTES
[place] [id number]  coding opportunities             Chart Reviewed * (Number of) Inbasket suggestions sent to Provider: 1             Number of suggestions actually used:1     Patients insurance company: [employer] (Medicare Advantage and Commercial)     Visit status: Patient arrived for their scheduled appointment     Provider never responded to [place] [address]  coding request     [place] [address]  coding opportunities             Chart reviewed, (number of) suggestions sent to provider: 1                  Patients insurance company: [employer] [address] [place] [address] (Medicare Advantage and Commercial)           Found in active problem list -     I13 0 : Hypertensive heart and chronic kidney disease with heart failure and stage 1 through stage 4 chronic kidney disease, or unspecified chronic kidney disease ([facility] )

## 2021-08-10 ENCOUNTER — OFFICE VISIT (OUTPATIENT)
Dept: FAMILY MEDICINE CLINIC | Facility: CLINIC | Age: 56
End: 2021-08-10
Payer: COMMERCIAL

## 2021-08-10 VITALS
RESPIRATION RATE: 18 BRPM | HEIGHT: 69 IN | TEMPERATURE: 98.9 F | DIASTOLIC BLOOD PRESSURE: 78 MMHG | SYSTOLIC BLOOD PRESSURE: 126 MMHG | BODY MASS INDEX: 43.4 KG/M2 | HEART RATE: 81 BPM | WEIGHT: 293 LBS | OXYGEN SATURATION: 96 %

## 2021-08-10 DIAGNOSIS — E66.01 MORBID OBESITY (HCC): ICD-10-CM

## 2021-08-10 DIAGNOSIS — I13.0 HYPERTENSIVE HEART AND KIDNEY DISEASE WITH HF AND WITH CKD STAGE I-IV (HCC): ICD-10-CM

## 2021-08-10 DIAGNOSIS — E11.8 TYPE 2 DIABETES MELLITUS WITH COMPLICATION, WITHOUT LONG-TERM CURRENT USE OF INSULIN (HCC): Primary | ICD-10-CM

## 2021-08-10 DIAGNOSIS — E11.22 CKD STAGE 1 DUE TO TYPE 2 DIABETES MELLITUS (HCC): ICD-10-CM

## 2021-08-10 DIAGNOSIS — N18.1 CKD STAGE 1 DUE TO TYPE 2 DIABETES MELLITUS (HCC): ICD-10-CM

## 2021-08-10 DIAGNOSIS — E78.49 OTHER HYPERLIPIDEMIA: ICD-10-CM

## 2021-08-10 DIAGNOSIS — M79.10 MYALGIA: ICD-10-CM

## 2021-08-10 PROBLEM — S76.212A STRAIN OF GROIN, LEFT, INITIAL ENCOUNTER: Status: RESOLVED | Noted: 2021-04-13 | Resolved: 2021-08-10

## 2021-08-10 PROBLEM — U07.1 COVID-19 VIRUS INFECTION: Status: RESOLVED | Noted: 2020-12-10 | Resolved: 2021-08-10

## 2021-08-10 PROBLEM — L98.9 SKIN LESION: Status: RESOLVED | Noted: 2021-04-13 | Resolved: 2021-08-10

## 2021-08-10 PROCEDURE — 3066F NEPHROPATHY DOC TX: CPT | Performed by: INTERNAL MEDICINE

## 2021-08-10 PROCEDURE — 99214 OFFICE O/P EST MOD 30 MIN: CPT | Performed by: FAMILY MEDICINE

## 2021-08-10 PROCEDURE — 3008F BODY MASS INDEX DOCD: CPT | Performed by: INTERNAL MEDICINE

## 2021-08-10 NOTE — PROGRESS NOTES
Assessment/Plan:    No problem-specific Assessment & Plan notes found for this encounter  2w trial off statin offered for myalgias, might consider intermittent dosing in future    DM2 improving, cont same meds and work on diet/wt loss and exercise    ckd1 stable, stay hydrated    htn stable but having low bp issues with entresto, has cardio f/u    Lab Results   Component Value Date    HGBA1C 6 7 (H) 08/02/2021    HGBA1C 6 6 (H) 04/12/2021    HGBA1C 6 4 (H) 01/02/2021     Lab Results   Component Value Date    GLUF 128 (H) 01/02/2021    [address] 02/28/2020    CREATININE 0 75 08/02/2021          Diagnoses and all orders for this visit:    Type 2 diabetes mellitus with complication, without long-term current use of insulin (HCC)  -     Comprehensive metabolic panel; Future  -     Hemoglobin A1C; Future    CKD stage 1 due to type 2 diabetes mellitus (HCC)    Myalgia  -     CK; Future    Morbid obesity ([facility] )    Other hyperlipidemia    Hypertensive heart and kidney disease with HF and with CKD stage I-IV ([facility] )        Return in about 3 months (around 11/10/2021)  Subjective:      Patient ID: Clary Leon is a 50 y o  female  Chief Complaint   Patient presents with    Follow-up     3 month   sas/cma       HPI  Wt up slightly  Urinating a lot  Had pasta last pm, carb aware  Not exercising  Not really counting all calories  Labs reviewed  Muscle pains per pt, upper thighs and upper arms, over 1m    The following portions of the patient's history were reviewed and updated as appropriate: allergies, current medications, past family history, past medical history, past social history, past surgical history and problem list     Review of Systems   Constitutional: Negative for fever  Respiratory: Negative for shortness of breath  Musculoskeletal: Positive for myalgias           Current Outpatient Medications   Medication Sig Dispense Refill    albuterol (PROAIR HFA) 90 mcg/act inhaler Inhale 1-2 puffs      atorvastatin (LIPITOR) 10 mg tablet TAKE 1 TABLET DAILY 90 tablet 0    carvedilol (COREG) 6 25 mg tablet Take 1 tablet (6 25 mg total) by mouth 2 (two) times a day with meals 180 tablet 2    Jardiance 10 MG TABS TAKE 1 TABLET EVERY MORNING 90 tablet 1    metFORMIN (GLUCOPHAGE-XR) 500 mg 24 hr tablet TAKE 1 TABLET(500 MG) BY MOUTH TWICE DAILY WITH MEALS AS DIRECTED 60 tablet 2    sacubitril-valsartan (Entresto) 24-26 MG TABS Take one tablet a day for one week than increase to one tablet twice a day 180 tablet 1    sitaGLIPtin (JANUVIA) 100 mg tablet Take 1 tablet (100 mg total) by mouth daily 90 tablet 1    spironolactone (ALDACTONE) 25 mg tablet Take 1 tablet (25 mg total) by mouth daily 90 tablet 2    torsemide (DEMADEX) 10 mg tablet Once a day on Mon-Friday- Sunday morning 45 tablet 1    chlorhexidine (PERIDEX) 0 12 % solution 2 (two) times a day (Patient not taking: Reported on 8/10/2021)      nystatin-triamcinolone (MYCOLOG-II) ointment Apply topically 2 (two) times a day (Patient not taking: Reported on 8/10/2021) 30 g 0     No current facility-administered medications for this visit  Objective:    /78   Pulse 81   Temp 98 9 °F (37 2 °C)   Resp 18   Ht 5' 9" (1 753 m)   Wt (!) 146 kg (322 lb)   LMP 12/03/2020   SpO2 96%   BMI 47 55 kg/m²        Physical Exam  Vitals and nursing note reviewed  Constitutional:       Appearance: She is well-developed  She is obese  She is not ill-appearing  HENT:      Head: Normocephalic  Eyes:      General: No scleral icterus  Conjunctiva/sclera: Conjunctivae normal    Cardiovascular:      Rate and Rhythm: Normal rate and regular rhythm  Pulses: no weak pulses          Dorsalis pedis pulses are 2+ on the right side and 2+ on the left side  Heart sounds: No murmur heard  Pulmonary:      Effort: Pulmonary effort is normal  No respiratory distress  Breath sounds: No rales  Abdominal:      Palpations: Abdomen is soft  Tenderness: There is no abdominal tenderness  Musculoskeletal:         General: Tenderness present  No deformity  Cervical back: Neck supple  Skin:     General: Skin is warm and dry  Coloration: Skin is not jaundiced or pale  Neurological:      Mental Status: She is alert  Motor: No weakness  Gait: Gait normal    Psychiatric:         Behavior: Behavior normal          Thought Content: Thought content normal        Diabetic Foot Exam    Patient's shoes and socks removed  Right Foot/Ankle   Right Foot Inspection  Skin Exam: skin not intact and no abnormal color                            Sensory       Monofilament testing: intact  Vascular    The right DP pulse is 2+  Left Foot/Ankle  Left Foot Inspection  Skin Exam: skin not intact and normal color                                         Sensory       Monofilament: intact  Vascular    The left DP pulse is 2+  Assign Risk Category:  No deformity present; No loss of protective sensation; No weak pulses       Risk: 0    BMI Counseling: Body mass index is 47 55 kg/m²  The BMI is above normal  Nutrition recommendations include decreasing portion sizes and moderation in carbohydrate intake  Exercise recommendations include exercising 3-5 times per week  No pharmacotherapy was ordered                Pb Nava DO

## 2021-08-10 NOTE — PATIENT INSTRUCTIONS
You can try to see if your muscle aches go away with a 2 week vacation from taking the atorvastatin (lipitor)

## 2021-08-12 DIAGNOSIS — E11.8 TYPE 2 DIABETES MELLITUS WITH COMPLICATION, WITHOUT LONG-TERM CURRENT USE OF INSULIN (HCC): ICD-10-CM

## 2021-08-12 RX ORDER — SITAGLIPTIN 100 MG/1
TABLET, FILM COATED ORAL
Qty: 90 TABLET | Refills: 3 | Status: SHIPPED | OUTPATIENT
Start: 2021-08-12 | End: 2022-05-24 | Stop reason: SDUPTHER

## 2021-09-18 DIAGNOSIS — R60.0 BILATERAL LEG EDEMA: ICD-10-CM

## 2021-09-18 DIAGNOSIS — I44.7 LBBB (LEFT BUNDLE BRANCH BLOCK): ICD-10-CM

## 2021-09-21 RX ORDER — CARVEDILOL 6.25 MG/1
TABLET ORAL
Qty: 180 TABLET | Refills: 3 | Status: SHIPPED | OUTPATIENT
Start: 2021-09-21

## 2021-09-21 RX ORDER — SPIRONOLACTONE 25 MG/1
TABLET ORAL
Qty: 90 TABLET | Refills: 3 | Status: SHIPPED | OUTPATIENT
Start: 2021-09-21

## 2021-09-27 ENCOUNTER — TELEPHONE (OUTPATIENT)
Dept: CARDIOLOGY CLINIC | Facility: CLINIC | Age: 56
End: 2021-09-27

## 2021-09-27 DIAGNOSIS — B37.3 VAGINAL YEAST INFECTION: Primary | ICD-10-CM

## 2021-09-27 RX ORDER — FLUCONAZOLE 150 MG/1
150 TABLET ORAL ONCE
Qty: 1 TABLET | Refills: 0 | Status: SHIPPED | OUTPATIENT
Start: 2021-09-27 | End: 2021-09-27

## 2021-09-27 NOTE — TELEPHONE ENCOUNTER
Patient informed script sent to the pharmacy if not improvement or worsening patient will need to be seen in the office   MM CMA

## 2021-09-27 NOTE — TELEPHONE ENCOUNTER
T/c from patient c/o a yeast  infection , white discharge and itching  requesting to be treated , was treated previously with Diflucan    Arbutus Linger

## 2021-09-28 NOTE — TELEPHONE ENCOUNTER
Can she try the entresto once a day  Split her meds  Take the aldactone torsemide carvedilol in morning  Second dose carvedilol and entresto at night   Let us know how she feels

## 2021-10-15 ENCOUNTER — OFFICE VISIT (OUTPATIENT)
Dept: FAMILY MEDICINE CLINIC | Facility: CLINIC | Age: 56
End: 2021-10-15
Payer: COMMERCIAL

## 2021-10-15 VITALS
DIASTOLIC BLOOD PRESSURE: 74 MMHG | OXYGEN SATURATION: 98 % | RESPIRATION RATE: 16 BRPM | HEIGHT: 69 IN | TEMPERATURE: 98.1 F | BODY MASS INDEX: 43.4 KG/M2 | WEIGHT: 293 LBS | HEART RATE: 74 BPM | SYSTOLIC BLOOD PRESSURE: 122 MMHG

## 2021-10-15 DIAGNOSIS — E11.22 TYPE 2 DIABETES MELLITUS WITH STAGE 1 CHRONIC KIDNEY DISEASE, WITHOUT LONG-TERM CURRENT USE OF INSULIN (HCC): ICD-10-CM

## 2021-10-15 DIAGNOSIS — S46.112A STRAIN OF LONG HEAD OF LEFT BICEPS: Primary | ICD-10-CM

## 2021-10-15 DIAGNOSIS — M79.651 BILATERAL THIGH PAIN: ICD-10-CM

## 2021-10-15 DIAGNOSIS — Z23 NEED FOR INFLUENZA VACCINATION: ICD-10-CM

## 2021-10-15 DIAGNOSIS — Z12.31 BREAST CANCER SCREENING BY MAMMOGRAM: ICD-10-CM

## 2021-10-15 DIAGNOSIS — M79.652 BILATERAL THIGH PAIN: ICD-10-CM

## 2021-10-15 DIAGNOSIS — N18.1 TYPE 2 DIABETES MELLITUS WITH STAGE 1 CHRONIC KIDNEY DISEASE, WITHOUT LONG-TERM CURRENT USE OF INSULIN (HCC): ICD-10-CM

## 2021-10-15 PROCEDURE — 3008F BODY MASS INDEX DOCD: CPT | Performed by: FAMILY MEDICINE

## 2021-10-15 PROCEDURE — 3066F NEPHROPATHY DOC TX: CPT | Performed by: FAMILY MEDICINE

## 2021-10-15 PROCEDURE — 3074F SYST BP LT 130 MM HG: CPT | Performed by: FAMILY MEDICINE

## 2021-10-15 PROCEDURE — 3078F DIAST BP <80 MM HG: CPT | Performed by: FAMILY MEDICINE

## 2021-10-15 PROCEDURE — 1036F TOBACCO NON-USER: CPT | Performed by: FAMILY MEDICINE

## 2021-10-15 PROCEDURE — 99214 OFFICE O/P EST MOD 30 MIN: CPT | Performed by: FAMILY MEDICINE

## 2021-10-15 PROCEDURE — 90471 IMMUNIZATION ADMIN: CPT

## 2021-10-15 PROCEDURE — 90682 RIV4 VACC RECOMBINANT DNA IM: CPT

## 2021-10-20 ENCOUNTER — HOSPITAL ENCOUNTER (OUTPATIENT)
Dept: RADIOLOGY | Facility: HOSPITAL | Age: 56
Discharge: HOME | End: 2021-10-20
Payer: COMMERCIAL

## 2021-10-20 DIAGNOSIS — M79.651 BILATERAL THIGH PAIN: ICD-10-CM

## 2021-10-20 DIAGNOSIS — S46.112A STRAIN OF LONG HEAD OF LEFT BICEPS: ICD-10-CM

## 2021-10-20 DIAGNOSIS — M79.652 BILATERAL THIGH PAIN: ICD-10-CM

## 2021-10-20 PROCEDURE — 73030 X-RAY EXAM OF SHOULDER: CPT

## 2021-10-20 PROCEDURE — 73522 X-RAY EXAM HIPS BI 3-4 VIEWS: CPT

## 2021-10-22 ENCOUNTER — TELEPHONE (OUTPATIENT)
Dept: FAMILY MEDICINE CLINIC | Facility: CLINIC | Age: 56
End: 2021-10-22

## 2021-10-28 DIAGNOSIS — E78.49 OTHER HYPERLIPIDEMIA: ICD-10-CM

## 2021-10-28 RX ORDER — ATORVASTATIN CALCIUM 10 MG/1
TABLET, FILM COATED ORAL
Qty: 90 TABLET | Refills: 1 | Status: SHIPPED | OUTPATIENT
Start: 2021-10-28 | End: 2022-04-26

## 2021-11-01 ENCOUNTER — RA CDI HCC (OUTPATIENT)
Dept: OTHER | Facility: HOSPITAL | Age: 56
End: 2021-11-01

## 2021-11-06 LAB
ALBUMIN SERPL-MCNC: 4.3 G/DL (ref 3.8–4.9)
ALBUMIN/GLOB SERPL: 1.3 {RATIO} (ref 1.2–2.2)
ALP SERPL-CCNC: 87 IU/L (ref 44–121)
ALT SERPL-CCNC: 16 IU/L (ref 0–32)
AST SERPL-CCNC: 19 IU/L (ref 0–40)
BILIRUB SERPL-MCNC: 0.6 MG/DL (ref 0–1.2)
BUN SERPL-MCNC: 17 MG/DL (ref 6–24)
BUN/CREAT SERPL: 23 (ref 9–23)
CALCIUM SERPL-MCNC: 9 MG/DL (ref 8.7–10.2)
CHLORIDE SERPL-SCNC: 100 MMOL/L (ref 96–106)
CK MB SERPL-MCNC: 1.2 NG/ML (ref 0–5.3)
CO2 SERPL-SCNC: 23 MMOL/L (ref 20–29)
CREAT SERPL-MCNC: 0.73 MG/DL (ref 0.57–1)
GLOBULIN SER-MCNC: 3.4 G/DL (ref 1.5–4.5)
GLUCOSE SERPL-MCNC: 124 MG/DL (ref 65–99)
HBA1C MFR BLD: 5.9 % (ref 4.8–5.6)
POTASSIUM SERPL-SCNC: 4.8 MMOL/L (ref 3.5–5.2)
PROT SERPL-MCNC: 7.7 G/DL (ref 6–8.5)
SL AMB EGFR AFRICAN AMERICAN: 109 ML/MIN/1.73
SL AMB EGFR NON AFRICAN AMERICAN: 94 ML/MIN/1.73
SODIUM SERPL-SCNC: 137 MMOL/L (ref 134–144)

## 2021-11-06 PROCEDURE — 3044F HG A1C LEVEL LT 7.0%: CPT | Performed by: FAMILY MEDICINE

## 2021-11-10 ENCOUNTER — OFFICE VISIT (OUTPATIENT)
Dept: FAMILY MEDICINE CLINIC | Facility: CLINIC | Age: 56
End: 2021-11-10
Payer: COMMERCIAL

## 2021-11-10 VITALS
OXYGEN SATURATION: 97 % | WEIGHT: 293 LBS | HEIGHT: 68 IN | BODY MASS INDEX: 44.41 KG/M2 | RESPIRATION RATE: 16 BRPM | SYSTOLIC BLOOD PRESSURE: 104 MMHG | TEMPERATURE: 97.4 F | HEART RATE: 73 BPM | DIASTOLIC BLOOD PRESSURE: 60 MMHG

## 2021-11-10 DIAGNOSIS — E11.8 TYPE 2 DIABETES MELLITUS WITH COMPLICATION, WITHOUT LONG-TERM CURRENT USE OF INSULIN (HCC): ICD-10-CM

## 2021-11-10 DIAGNOSIS — E11.22 CKD STAGE 1 DUE TO TYPE 2 DIABETES MELLITUS (HCC): ICD-10-CM

## 2021-11-10 DIAGNOSIS — E66.01 MORBID OBESITY (HCC): ICD-10-CM

## 2021-11-10 DIAGNOSIS — E78.5 HYPERLIPIDEMIA, UNSPECIFIED HYPERLIPIDEMIA TYPE: ICD-10-CM

## 2021-11-10 DIAGNOSIS — E11.22 TYPE 2 DIABETES MELLITUS WITH STAGE 1 CHRONIC KIDNEY DISEASE, WITHOUT LONG-TERM CURRENT USE OF INSULIN (HCC): Primary | ICD-10-CM

## 2021-11-10 DIAGNOSIS — I50.1 HEART FAILURE, LEFT, WITH LVEF 41-49% (HCC): ICD-10-CM

## 2021-11-10 DIAGNOSIS — N18.1 TYPE 2 DIABETES MELLITUS WITH STAGE 1 CHRONIC KIDNEY DISEASE, WITHOUT LONG-TERM CURRENT USE OF INSULIN (HCC): Primary | ICD-10-CM

## 2021-11-10 DIAGNOSIS — N18.1 CKD STAGE 1 DUE TO TYPE 2 DIABETES MELLITUS (HCC): ICD-10-CM

## 2021-11-10 PROCEDURE — 3078F DIAST BP <80 MM HG: CPT | Performed by: FAMILY MEDICINE

## 2021-11-10 PROCEDURE — 99214 OFFICE O/P EST MOD 30 MIN: CPT | Performed by: FAMILY MEDICINE

## 2021-11-10 PROCEDURE — 1036F TOBACCO NON-USER: CPT | Performed by: FAMILY MEDICINE

## 2021-11-10 PROCEDURE — 3066F NEPHROPATHY DOC TX: CPT | Performed by: FAMILY MEDICINE

## 2021-11-10 PROCEDURE — 3725F SCREEN DEPRESSION PERFORMED: CPT | Performed by: FAMILY MEDICINE

## 2021-11-10 PROCEDURE — 3008F BODY MASS INDEX DOCD: CPT | Performed by: FAMILY MEDICINE

## 2021-11-10 PROCEDURE — 3074F SYST BP LT 130 MM HG: CPT | Performed by: FAMILY MEDICINE

## 2021-11-10 RX ORDER — EMPAGLIFLOZIN 10 MG/1
TABLET, FILM COATED ORAL
Qty: 90 TABLET | Refills: 3 | Status: SHIPPED | OUTPATIENT
Start: 2021-11-10 | End: 2022-05-24 | Stop reason: SDUPTHER

## 2021-11-16 ENCOUNTER — EVALUATION (OUTPATIENT)
Dept: OCCUPATIONAL THERAPY | Facility: CLINIC | Age: 56
End: 2021-11-16
Payer: COMMERCIAL

## 2021-11-16 DIAGNOSIS — S46.112A STRAIN OF LONG HEAD OF LEFT BICEPS: ICD-10-CM

## 2021-11-16 DIAGNOSIS — E11.8 TYPE 2 DIABETES MELLITUS WITH COMPLICATION, WITHOUT LONG-TERM CURRENT USE OF INSULIN (HCC): ICD-10-CM

## 2021-11-16 PROCEDURE — 97110 THERAPEUTIC EXERCISES: CPT

## 2021-11-16 RX ORDER — METFORMIN HYDROCHLORIDE 500 MG/1
TABLET, EXTENDED RELEASE ORAL
Qty: 60 TABLET | Refills: 2 | Status: SHIPPED | OUTPATIENT
Start: 2021-11-16 | End: 2022-02-28

## 2021-11-17 PROCEDURE — 97165 OT EVAL LOW COMPLEX 30 MIN: CPT

## 2021-11-23 ENCOUNTER — OFFICE VISIT (OUTPATIENT)
Dept: OCCUPATIONAL THERAPY | Facility: CLINIC | Age: 56
End: 2021-11-23
Payer: COMMERCIAL

## 2021-11-23 DIAGNOSIS — S46.112A STRAIN OF LONG HEAD OF LEFT BICEPS: Primary | ICD-10-CM

## 2021-11-23 PROCEDURE — 97140 MANUAL THERAPY 1/> REGIONS: CPT

## 2021-11-23 PROCEDURE — 97110 THERAPEUTIC EXERCISES: CPT

## 2021-11-30 ENCOUNTER — OFFICE VISIT (OUTPATIENT)
Dept: OCCUPATIONAL THERAPY | Facility: CLINIC | Age: 56
End: 2021-11-30
Payer: COMMERCIAL

## 2021-11-30 ENCOUNTER — IMMUNIZATIONS (OUTPATIENT)
Dept: FAMILY MEDICINE CLINIC | Facility: HOSPITAL | Age: 56
End: 2021-11-30

## 2021-11-30 DIAGNOSIS — S46.112A STRAIN OF LONG HEAD OF LEFT BICEPS: Primary | ICD-10-CM

## 2021-11-30 DIAGNOSIS — Z23 ENCOUNTER FOR IMMUNIZATION: Primary | ICD-10-CM

## 2021-11-30 PROCEDURE — 0001A COVID-19 PFIZER VACC 0.3 ML: CPT

## 2021-11-30 PROCEDURE — 97110 THERAPEUTIC EXERCISES: CPT

## 2021-11-30 PROCEDURE — 97140 MANUAL THERAPY 1/> REGIONS: CPT

## 2021-11-30 PROCEDURE — 91300 COVID-19 PFIZER VACC 0.3 ML: CPT

## 2021-12-14 ENCOUNTER — HOSPITAL ENCOUNTER (OUTPATIENT)
Dept: RADIOLOGY | Facility: HOSPITAL | Age: 56
Discharge: HOME | End: 2021-12-14
Attending: FAMILY MEDICINE
Payer: COMMERCIAL

## 2021-12-14 VITALS — BODY MASS INDEX: 44.41 KG/M2 | WEIGHT: 293 LBS | HEIGHT: 68 IN

## 2021-12-14 DIAGNOSIS — Z12.31 BREAST CANCER SCREENING BY MAMMOGRAM: ICD-10-CM

## 2021-12-14 PROCEDURE — 77067 SCR MAMMO BI INCL CAD: CPT

## 2021-12-14 PROCEDURE — 77063 BREAST TOMOSYNTHESIS BI: CPT

## 2022-01-10 ENCOUNTER — OFFICE VISIT (OUTPATIENT)
Dept: FAMILY MEDICINE CLINIC | Facility: CLINIC | Age: 57
End: 2022-01-10
Payer: COMMERCIAL

## 2022-01-10 ENCOUNTER — NURSE TRIAGE (OUTPATIENT)
Dept: OTHER | Facility: OTHER | Age: 57
End: 2022-01-10

## 2022-01-10 VITALS
OXYGEN SATURATION: 98 % | DIASTOLIC BLOOD PRESSURE: 66 MMHG | HEART RATE: 95 BPM | WEIGHT: 293 LBS | HEIGHT: 68 IN | SYSTOLIC BLOOD PRESSURE: 110 MMHG | TEMPERATURE: 97.3 F | RESPIRATION RATE: 16 BRPM | BODY MASS INDEX: 44.41 KG/M2

## 2022-01-10 DIAGNOSIS — J01.00 ACUTE NON-RECURRENT MAXILLARY SINUSITIS: Primary | ICD-10-CM

## 2022-01-10 DIAGNOSIS — Z12.11 COLON CANCER SCREENING: ICD-10-CM

## 2022-01-10 DIAGNOSIS — N18.1 TYPE 2 DIABETES MELLITUS WITH STAGE 1 CHRONIC KIDNEY DISEASE, WITHOUT LONG-TERM CURRENT USE OF INSULIN (HCC): ICD-10-CM

## 2022-01-10 DIAGNOSIS — B37.3 VAGINAL YEAST INFECTION: ICD-10-CM

## 2022-01-10 DIAGNOSIS — E11.22 TYPE 2 DIABETES MELLITUS WITH STAGE 1 CHRONIC KIDNEY DISEASE, WITHOUT LONG-TERM CURRENT USE OF INSULIN (HCC): ICD-10-CM

## 2022-01-10 DIAGNOSIS — I13.0 HYPERTENSIVE HEART AND KIDNEY DISEASE WITH HF AND WITH CKD STAGE I-IV (HCC): ICD-10-CM

## 2022-01-10 PROBLEM — B37.31 VAGINAL YEAST INFECTION: Status: ACTIVE | Noted: 2022-01-10

## 2022-01-10 LAB — S PYO AG THROAT QL: NEGATIVE

## 2022-01-10 PROCEDURE — 87880 STREP A ASSAY W/OPTIC: CPT | Performed by: FAMILY MEDICINE

## 2022-01-10 PROCEDURE — 1036F TOBACCO NON-USER: CPT | Performed by: FAMILY MEDICINE

## 2022-01-10 PROCEDURE — 3074F SYST BP LT 130 MM HG: CPT | Performed by: FAMILY MEDICINE

## 2022-01-10 PROCEDURE — 99214 OFFICE O/P EST MOD 30 MIN: CPT | Performed by: FAMILY MEDICINE

## 2022-01-10 PROCEDURE — 3066F NEPHROPATHY DOC TX: CPT | Performed by: FAMILY MEDICINE

## 2022-01-10 PROCEDURE — 3078F DIAST BP <80 MM HG: CPT | Performed by: FAMILY MEDICINE

## 2022-01-10 PROCEDURE — 3008F BODY MASS INDEX DOCD: CPT | Performed by: FAMILY MEDICINE

## 2022-01-10 RX ORDER — FLUCONAZOLE 150 MG/1
150 TABLET ORAL ONCE
Qty: 1 TABLET | Refills: 0 | Status: SHIPPED | OUTPATIENT
Start: 2022-01-10 | End: 2022-01-10

## 2022-01-10 RX ORDER — CEFDINIR 300 MG/1
300 CAPSULE ORAL EVERY 12 HOURS SCHEDULED
Qty: 20 CAPSULE | Refills: 0 | Status: SHIPPED | OUTPATIENT
Start: 2022-01-10 | End: 2022-01-20

## 2022-01-10 NOTE — PATIENT INSTRUCTIONS
Over-the-counter products can be useful for your symptoms:                      <<GUAIFENESIN>> is an expectorant that is useful for thick mucus  Often found in Robitussin and Mucinex products  <<DEXTROMETHORPHAN>> is a cough suppressant that works in the brain   to help reduce bothersome cough  Use with caution with other medications that work in the brain  <<ANTI-HISTAMINES>> are useful as allergy medications and to help dry up   bothersome thin secretions  Less sedating options include   Claritin, Zyrtec, Allegra and Xyzal and have generic OTC forms  <<PSEUDOEPHEDRINE and PHENYLEPHRINE>> are stimulant decongestants   that can be used for congestion and sinus pressure  Avoid or use with caution with high blood pressure or heart conditions  <<NASAL SPRAYS>> Steroid nasal sprays (flonase, nasacort) are used for allergies but   can be used for congestion also  Safe for high blood pressure  Afrin is a decongestant that works quickly but for up to 3 days        As an example, you could use mucinex DM or the equivalent

## 2022-01-10 NOTE — PROGRESS NOTES
Assessment/Plan:    No problem-specific Assessment & Plan notes found for this encounter  Sinusitis new  Take mucinex, avoid stimulants    DM2 stable, continue meds  Last a1c 5 9    htn stable  On meds     Diagnoses and all orders for this visit:    Acute non-recurrent maxillary sinusitis  -     cefdinir (OMNICEF) 300 mg capsule; Take 1 capsule (300 mg total) by mouth every 12 (twelve) hours for 10 days    Vaginal yeast infection  -     fluconazole (DIFLUCAN) 150 mg tablet; Take 1 tablet (150 mg total) by mouth once for 1 dose    Colon cancer screening  -     Ambulatory referral to Gastroenterology; Future    Type 2 diabetes mellitus with stage 1 chronic kidney disease, without long-term current use of insulin (HCC)    Hypertensive heart and kidney disease with HF and with CKD stage I-IV ([facility] )          Return if symptoms worsen or fail to improve  Subjective:      Patient ID: Joseph Pineda is a 49 y o  female  Chief Complaint   Patient presents with    Sore Throat     Possible Strep mz cma       HPI  No exposures  Home covid neg x 2 36hrs apart    Last close exposure to confirmed case:  ____n  Last close exposure to possible case:  ___    Fever_________________________n  Cough:  ___________________n  SOB:  __________________n  Loss of taste:  _________n  Loss of smell:  ______n  Other symptoms:  __fatigue, nasal congestion, sore throat, worsening, nasal dc green and copious  dayquill    First day of symptoms:  ______1/7/22  Covid test in past:   _______neg at home x 2  Had covid vaccine:   ____y  PUI status advised:   __na    Strep neg in office    The following portions of the patient's history were reviewed and updated as appropriate: allergies, current medications, past family history, past medical history, past social history, past surgical history and problem list     Review of Systems   Respiratory: Negative for shortness of breath and wheezing            Current Outpatient Medications   Medication Sig Dispense Refill    albuterol (PROAIR HFA) 90 mcg/act inhaler Inhale 1-2 puffs      atorvastatin (LIPITOR) 10 mg tablet TAKE 1 TABLET DAILY 90 tablet 1    carvedilol (COREG) 6 25 mg tablet TAKE 1 TABLET TWICE A DAY WITH MEALS 180 tablet 3    Januvia 100 MG tablet TAKE 1 TABLET DAILY (REPLACES TRADJENTA) 90 tablet 3    Jardiance 10 MG TABS TAKE 1 TABLET EVERY MORNING 90 tablet 3    metFORMIN (GLUCOPHAGE-XR) 500 mg 24 hr tablet TAKE 1 TABLET(500 MG) BY MOUTH TWICE DAILY WITH MEALS AS DIRECTED 60 tablet 2    sacubitril-valsartan (Entresto) 24-26 MG TABS Take one tablet a day for one week than increase to one tablet twice a day 180 tablet 1    spironolactone (ALDACTONE) 25 mg tablet TAKE 1 TABLET DAILY 90 tablet 3    torsemide (DEMADEX) 10 mg tablet Once a day on Mon-Friday- Sunday morning 45 tablet 1    cefdinir (OMNICEF) 300 mg capsule Take 1 capsule (300 mg total) by mouth every 12 (twelve) hours for 10 days 20 capsule 0    fluconazole (DIFLUCAN) 150 mg tablet Take 1 tablet (150 mg total) by mouth once for 1 dose 1 tablet 0     No current facility-administered medications for this visit  Objective:    /66   Pulse 95   Temp (!) 97 3 °F (36 3 °C)   Resp 16   Ht 5' 7 5" (1 715 m)   Wt (!) 138 kg (305 lb)   SpO2 98%   BMI 47 06 kg/m²        Physical Exam  Vitals and nursing note reviewed  Constitutional:       Appearance: She is well-developed  She is obese  She is not ill-appearing  HENT:      Head: Normocephalic  Right Ear: Tympanic membrane normal       Left Ear: Tympanic membrane normal       Nose: Congestion present  Comments: Sinuses tender to percussion, nasal turbinates visualized and appear red and swollen    Eyes:      General: No scleral icterus  Conjunctiva/sclera: Conjunctivae normal    Cardiovascular:      Rate and Rhythm: Normal rate and regular rhythm  Heart sounds: No murmur heard        Pulmonary:      Effort: Pulmonary effort is normal  No respiratory distress  Breath sounds: No wheezing  Abdominal:      Palpations: Abdomen is soft  Musculoskeletal:         General: No deformity  Cervical back: Neck supple  Skin:     General: Skin is warm and dry  Coloration: Skin is not pale  Neurological:      Mental Status: She is alert  Psychiatric:         Behavior: Behavior normal          Thought Content:  Thought content normal                 Jannie Arndt, DO

## 2022-01-10 NOTE — LETTER
January 10, 2022     Patient: Hermelinda Rogel   YOB: 1965   Date of Visit: 1/10/2022       To Whom it May Concern:    Hermelinda Rogel is under my professional care  She was seen in my office on 1/10/2022  Excuse from work today  If you have any questions or concerns, please don't hesitate to call           Sincerely,          Beverly Coleman DO        CC: No Recipients

## 2022-01-10 NOTE — TELEPHONE ENCOUNTER
Regarding: covid symptomatic   ----- Message from Laura Juarez sent at 1/9/2022  3:32 PM EST -----  " I am congested, coughing, and a sore throat  "

## 2022-02-08 ENCOUNTER — RA CDI HCC (OUTPATIENT)
Dept: OTHER | Facility: HOSPITAL | Age: 57
End: 2022-02-08

## 2022-02-08 NOTE — PROGRESS NOTES
[facility]  coding opportunities          Number of diagnosis code(s) already on the problem list added to FYI fla                     Patients insurance company: [employer] (Medicare Advantage and Commercial)           appt on 2/15/22    Found in active problem - please assess for     E66 01: Morbid (severe) obesity due to excess calories ([facility] ) - Per CMS/ICD 10 coding guidelines, to be used when BMI >=40

## 2022-02-11 LAB
ALBUMIN SERPL-MCNC: 4.4 G/DL (ref 3.8–4.9)
ALBUMIN/CREAT UR: 4 MG/G CREAT (ref 0–29)
ALBUMIN/GLOB SERPL: 1.2 {RATIO} (ref 1.2–2.2)
ALP SERPL-CCNC: 84 IU/L (ref 44–121)
ALT SERPL-CCNC: 16 IU/L (ref 0–32)
AST SERPL-CCNC: 16 IU/L (ref 0–40)
BILIRUB SERPL-MCNC: 0.7 MG/DL (ref 0–1.2)
BUN SERPL-MCNC: 16 MG/DL (ref 6–24)
BUN/CREAT SERPL: 22 (ref 9–23)
CALCIUM SERPL-MCNC: 9.5 MG/DL (ref 8.7–10.2)
CHLORIDE SERPL-SCNC: 101 MMOL/L (ref 96–106)
CHOLEST SERPL-MCNC: 146 MG/DL (ref 100–199)
CO2 SERPL-SCNC: 23 MMOL/L (ref 20–29)
CREAT SERPL-MCNC: 0.74 MG/DL (ref 0.57–1)
CREAT UR-MCNC: 92 MG/DL
GLOBULIN SER-MCNC: 3.6 G/DL (ref 1.5–4.5)
GLUCOSE SERPL-MCNC: 111 MG/DL (ref 65–99)
HBA1C MFR BLD: 6.1 % (ref 4.8–5.6)
HDLC SERPL-MCNC: 52 MG/DL
LDLC SERPL CALC-MCNC: 78 MG/DL (ref 0–99)
MICROALBUMIN UR-MCNC: 3.5 UG/ML
MICRODELETION SYND BLD/T FISH: NORMAL
POTASSIUM SERPL-SCNC: 4.3 MMOL/L (ref 3.5–5.2)
PROT SERPL-MCNC: 8 G/DL (ref 6–8.5)
SL AMB EGFR AFRICAN AMERICAN: 106 ML/MIN/1.73
SL AMB EGFR NON AFRICAN AMERICAN: 92 ML/MIN/1.73
SODIUM SERPL-SCNC: 138 MMOL/L (ref 134–144)
TRIGL SERPL-MCNC: 86 MG/DL (ref 0–149)

## 2022-02-11 PROCEDURE — 3061F NEG MICROALBUMINURIA REV: CPT | Performed by: FAMILY MEDICINE

## 2022-02-11 PROCEDURE — 3044F HG A1C LEVEL LT 7.0%: CPT | Performed by: FAMILY MEDICINE

## 2022-02-15 ENCOUNTER — OFFICE VISIT (OUTPATIENT)
Dept: FAMILY MEDICINE CLINIC | Facility: CLINIC | Age: 57
End: 2022-02-15
Payer: COMMERCIAL

## 2022-02-15 VITALS
BODY MASS INDEX: 44.41 KG/M2 | DIASTOLIC BLOOD PRESSURE: 62 MMHG | TEMPERATURE: 97.2 F | HEIGHT: 68 IN | HEART RATE: 77 BPM | RESPIRATION RATE: 16 BRPM | OXYGEN SATURATION: 99 % | SYSTOLIC BLOOD PRESSURE: 110 MMHG | WEIGHT: 293 LBS

## 2022-02-15 DIAGNOSIS — E78.5 HYPERLIPIDEMIA, UNSPECIFIED HYPERLIPIDEMIA TYPE: ICD-10-CM

## 2022-02-15 DIAGNOSIS — E11.22 TYPE 2 DIABETES MELLITUS WITH STAGE 1 CHRONIC KIDNEY DISEASE, WITHOUT LONG-TERM CURRENT USE OF INSULIN (HCC): Primary | ICD-10-CM

## 2022-02-15 DIAGNOSIS — N18.1 CKD STAGE 1 DUE TO TYPE 2 DIABETES MELLITUS (HCC): ICD-10-CM

## 2022-02-15 DIAGNOSIS — E11.22 CKD STAGE 1 DUE TO TYPE 2 DIABETES MELLITUS (HCC): ICD-10-CM

## 2022-02-15 DIAGNOSIS — N18.1 TYPE 2 DIABETES MELLITUS WITH STAGE 1 CHRONIC KIDNEY DISEASE, WITHOUT LONG-TERM CURRENT USE OF INSULIN (HCC): Primary | ICD-10-CM

## 2022-02-15 DIAGNOSIS — I50.1 HEART FAILURE, LEFT, WITH LVEF 41-49% (HCC): ICD-10-CM

## 2022-02-15 PROBLEM — J01.00 ACUTE NON-RECURRENT MAXILLARY SINUSITIS: Status: RESOLVED | Noted: 2022-01-10 | Resolved: 2022-02-15

## 2022-02-15 PROCEDURE — 3074F SYST BP LT 130 MM HG: CPT | Performed by: FAMILY MEDICINE

## 2022-02-15 PROCEDURE — 3008F BODY MASS INDEX DOCD: CPT | Performed by: FAMILY MEDICINE

## 2022-02-15 PROCEDURE — 3078F DIAST BP <80 MM HG: CPT | Performed by: FAMILY MEDICINE

## 2022-02-15 PROCEDURE — 3066F NEPHROPATHY DOC TX: CPT | Performed by: FAMILY MEDICINE

## 2022-02-15 PROCEDURE — 1036F TOBACCO NON-USER: CPT | Performed by: FAMILY MEDICINE

## 2022-02-15 PROCEDURE — 99214 OFFICE O/P EST MOD 30 MIN: CPT | Performed by: FAMILY MEDICINE

## 2022-02-15 NOTE — PROGRESS NOTES
Assessment/Plan:    No problem-specific Assessment & Plan notes found for this encounter  DM2 stable, continue same  ckd1 stable  HLD good at LDL 66  bmi discussed     Diagnoses and all orders for this visit:    Type 2 diabetes mellitus with stage 1 chronic kidney disease, without long-term current use of insulin (HCC)  -     Comprehensive metabolic panel; Future  -     Hemoglobin A1C; Future    CKD stage 1 due to type 2 diabetes mellitus (HCC)    Heart failure, left, with LVEF 41-49% (HCC)    Hyperlipidemia, unspecified hyperlipidemia type      Return in about 3 months (around 5/15/2022) for Recheck  Subjective:      Patient ID: Elijah Vasquez is a 49 y o  female  Chief Complaint   Patient presents with    Follow-up       HPI  Wt about same  Following diet  Past 3w been exercising    Lab Results   Component Value Date    HGBA1C 6 1 (H) 02/10/2022    HGBA1C 5 9 (H) 11/05/2021    HGBA1C 6 7 (H) 08/02/2021     Lab Results   Component Value Date    GLUF 128 (H) 01/02/2021    [address] 78 02/10/2022    CREATININE 0 74 02/10/2022       The following portions of the patient's history were reviewed and updated as appropriate: allergies, current medications, past family history, past medical history, past social history, past surgical history and problem list     Review of Systems   Constitutional: Negative for fever  Respiratory: Negative for shortness of breath            Current Outpatient Medications   Medication Sig Dispense Refill    albuterol (PROAIR HFA) 90 mcg/act inhaler Inhale 1-2 puffs      atorvastatin (LIPITOR) 10 mg tablet TAKE 1 TABLET DAILY 90 tablet 1    carvedilol (COREG) 6 25 mg tablet TAKE 1 TABLET TWICE A DAY WITH MEALS 180 tablet 3    Januvia 100 MG tablet TAKE 1 TABLET DAILY (REPLACES TRADJENTA) 90 tablet 3    Jardiance 10 MG TABS TAKE 1 TABLET EVERY MORNING 90 tablet 3    metFORMIN (GLUCOPHAGE-XR) 500 mg 24 hr tablet TAKE 1 TABLET(500 MG) BY MOUTH TWICE DAILY WITH MEALS AS DIRECTED 60 tablet 2    sacubitril-valsartan (Entresto) 24-26 MG TABS Take one tablet a day for one week than increase to one tablet twice a day 180 tablet 1    spironolactone (ALDACTONE) 25 mg tablet TAKE 1 TABLET DAILY 90 tablet 3    torsemide (DEMADEX) 10 mg tablet Once a day on Mon-Friday- Sunday morning 45 tablet 1     No current facility-administered medications for this visit  Objective:    /62   Pulse 77   Temp (!) 97 2 °F (36 2 °C)   Resp 16   Ht 5' 7 5" (1 715 m)   Wt (!) 138 kg (305 lb)   SpO2 99%   BMI 47 06 kg/m²        Physical Exam  Vitals and nursing note reviewed  Constitutional:       Appearance: She is well-developed  She is obese  She is not diaphoretic  HENT:      Head: Normocephalic  Right Ear: Tympanic membrane normal       Left Ear: Tympanic membrane normal    Eyes:      General: No scleral icterus  Conjunctiva/sclera: Conjunctivae normal    Cardiovascular:      Rate and Rhythm: Normal rate and regular rhythm  Heart sounds: No murmur heard  Pulmonary:      Effort: Pulmonary effort is normal  No respiratory distress  Breath sounds: No wheezing  Abdominal:      Palpations: Abdomen is soft  Musculoskeletal:         General: No deformity  Cervical back: Neck supple  Skin:     General: Skin is warm and dry  Coloration: Skin is not pale  Neurological:      Mental Status: She is alert  Psychiatric:         Mood and Affect: Mood normal          Behavior: Behavior normal          Thought Content: Thought content normal        BMI Counseling: Body mass index is 47 06 kg/m²  The BMI is above normal  Nutrition recommendations include decreasing portion sizes and moderation in carbohydrate intake  Exercise recommendations include exercising 3-5 times per week  No pharmacotherapy was ordered  Rationale for BMI follow-up plan is due to patient being overweight or obese                Nishi Swan DO

## 2022-02-28 DIAGNOSIS — E11.8 TYPE 2 DIABETES MELLITUS WITH COMPLICATION, WITHOUT LONG-TERM CURRENT USE OF INSULIN (HCC): ICD-10-CM

## 2022-02-28 RX ORDER — METFORMIN HYDROCHLORIDE 500 MG/1
TABLET, EXTENDED RELEASE ORAL
Qty: 60 TABLET | Refills: 5 | Status: SHIPPED | OUTPATIENT
Start: 2022-02-28 | End: 2022-05-24

## 2022-03-04 DIAGNOSIS — I44.7 LBBB (LEFT BUNDLE BRANCH BLOCK): ICD-10-CM

## 2022-03-04 DIAGNOSIS — E78.49 OTHER HYPERLIPIDEMIA: ICD-10-CM

## 2022-03-04 RX ORDER — SACUBITRIL AND VALSARTAN 24; 26 MG/1; MG/1
TABLET, FILM COATED ORAL
Qty: 180 TABLET | Refills: 3 | Status: SHIPPED | OUTPATIENT
Start: 2022-03-04

## 2022-03-22 ENCOUNTER — TELEPHONE (OUTPATIENT)
Dept: OBGYN CLINIC | Facility: HOSPITAL | Age: 57
End: 2022-03-22

## 2022-03-22 ENCOUNTER — TELEPHONE (OUTPATIENT)
Dept: OBGYN CLINIC | Facility: CLINIC | Age: 57
End: 2022-03-22

## 2022-03-22 NOTE — TELEPHONE ENCOUNTER
L/m for pt to inform rx sent and if symptoms worsen or do not improve, will need to be seen in office

## 2022-03-22 NOTE — TELEPHONE ENCOUNTER
I received a Care Request from patient to schedule for:    Where Does it Hurt? Shoulder  Are you considering joint replacement? No   Are you seeking a second opinion? No  If yes, who is your doctor? Patient is scheduled

## 2022-03-22 NOTE — TELEPHONE ENCOUNTER
Pt called and states she is having severe itching and discharge  She is diabetic and states she did eat more sugar than normal over the weekend  Would like to know if Diflucan can be sent in for her  Annual scheduled 5/9/2022

## 2022-03-23 ENCOUNTER — OFFICE VISIT (OUTPATIENT)
Dept: OBGYN CLINIC | Facility: CLINIC | Age: 57
End: 2022-03-23
Payer: COMMERCIAL

## 2022-03-23 VITALS
BODY MASS INDEX: 47.09 KG/M2 | HEIGHT: 66 IN | HEART RATE: 69 BPM | DIASTOLIC BLOOD PRESSURE: 71 MMHG | WEIGHT: 293 LBS | SYSTOLIC BLOOD PRESSURE: 109 MMHG

## 2022-03-23 DIAGNOSIS — M75.02 ADHESIVE CAPSULITIS OF LEFT SHOULDER: Primary | ICD-10-CM

## 2022-03-23 DIAGNOSIS — M75.82 ROTATOR CUFF TENDINITIS, LEFT: ICD-10-CM

## 2022-03-23 DIAGNOSIS — M75.22 BICEPS TENDINITIS ON LEFT: ICD-10-CM

## 2022-03-23 PROCEDURE — 3008F BODY MASS INDEX DOCD: CPT | Performed by: ORTHOPAEDIC SURGERY

## 2022-03-23 PROCEDURE — 3074F SYST BP LT 130 MM HG: CPT | Performed by: ORTHOPAEDIC SURGERY

## 2022-03-23 PROCEDURE — 1036F TOBACCO NON-USER: CPT | Performed by: ORTHOPAEDIC SURGERY

## 2022-03-23 PROCEDURE — 99204 OFFICE O/P NEW MOD 45 MIN: CPT | Performed by: ORTHOPAEDIC SURGERY

## 2022-03-23 PROCEDURE — 3078F DIAST BP <80 MM HG: CPT | Performed by: ORTHOPAEDIC SURGERY

## 2022-03-23 RX ORDER — HYDROCODONE BITARTRATE AND ACETAMINOPHEN 5; 325 MG/1; MG/1
1 TABLET ORAL SEE ADMIN INSTRUCTIONS
Qty: 10 TABLET | Refills: 0 | Status: SHIPPED | OUTPATIENT
Start: 2022-03-23 | End: 2022-05-24

## 2022-03-23 NOTE — PROGRESS NOTES
Assessment:  1  Adhesive capsulitis of left shoulder  Ambulatory referral to Physical Therapy   2  Rotator cuff tendinitis, left     3  Biceps tendinitis on left       Patient Active Problem List   Diagnosis    ARMANDO (obstructive sleep apnea)    Morbid obesity ([facility])    Bilateral leg edema    LBBB (left bundle branch block)    Screening for cardiovascular, respiratory, and genitourinary diseases    Hypertensive heart and kidney disease with HF and with CKD stage I-IV ([facility] )    Chronic eczema    CKD stage 1 due to type 2 diabetes mellitus ([facility] )    Degenerative arthritis of thoracic spine    Generalized anxiety disorder    Heart failure, left, with LVEF 41-49% ([facility])    Low back pain    Type 2 diabetes mellitus with stage 1 chronic kidney disease, without long-term current use of insulin ([facility])    Allergic rhinitis due to allergen    Gynecologic exam normal    Colon cancer screening    Other hyperlipidemia    Rash    Breast cancer screening    Immunization due    Routine gynecological examination   [facility] maintenance    History of 2019 novel coronavirus disease (COVID-19)    Word finding difficulty    Memory difficulties    Myalgia    Strain of long head of left biceps    Bilateral thigh pain    Hyperlipidemia    Vaginal yeast infection           Plan:  Patient has left shoulder he has capsulitis which tear cuff biceps tendinitis    · PT order was given out today   · Prescribed patient Norco to take before going to physical therapy  · Follow up in 8 weeks                 Subjective:     Patient ID:    Chief Complaint:Catheryn Briscoe Squibb 49 y o  female      HPI    Patient comes in today for consultation for left shoulder pain  She was referred by her PCP Dr Maribel Harmon  The patient reports that the pain is in the left shoulder since September October 21 and states she had days having COVID vaccination and shot in her left arm at that time    She was seen by PCP x-rays taken shoulder which showed AC joint arthritis occupational therapy  She had four sessions occupational therapy evaluations was pain worse  The pain is rated at0 at its best and8 at its worst   The pain is described as stabbing shooting pain from the left anterior lateral left shoulder radiating down to the biceps region     It is worsened with internal rotation, overhead reaching and has trouble sleeping on the left side  , and is made better with doing pendulum exercises  The patient has taken Advil as needed for pain for treatment        The following portions of the patient's history were reviewed and updated as appropriate: allergies, current medications, past family history, past social history, past surgical history and problem list         Social History     Socioeconomic History    Marital status: Legally      Spouse name: Not on file    Number of children: 2    Years of education: Not on file    Highest education level: Not on file   Occupational History    Not on file   Tobacco Use    Smoking status: Never Smoker    Smokeless tobacco: Never Used    Tobacco comment: N/a   Vaping Use    Vaping Use: Never used   Substance and Sexual Activity    Alcohol use: Never    Drug use: Never    Sexual activity: Yes     Partners: Male     Comment: pt  requests std testing   Other Topics Concern    Not on file   Social History Narrative    Employed    Lack of adequate sleep    Lack of exercise    Pets: dog     Social Determinants of Health     Financial Resource Strain: Not on file   Food Insecurity: Not on file   Transportation Needs: Not on file   Physical Activity: Not on file   Stress: Not on file   Social Connections: Not on file   Intimate Partner Violence: Not on file   Housing Stability: Not on file     Past Medical History:   Diagnosis Date    Achilles tendinitis, unspecified leg 12/14/2006    Anxiety     Arthritis     Last Assessed: 9/28/2017    Asthma     Last Assessed: 9/28/2017    Bilateral leg edema     BMI 50  0-59 9, adult ([place] [address] )     Breast lump 07/11/2008    Chest pain on breathing     Last Assessed: 3/9/2014    Chronic kidney disease, stage 1     Last Assessed: 3/1/2014    Clotting disorder ([place] [address] )     Diabetes mellitus ([place] [address] )     Last Assessed: 9/28/2017 Type 2 with renal manifestations, controlled    Eczema     Environmental allergies     Last Assessed: 9/28/2017    Exposure to potentially hazardous body fluids     Last Assessed: 4/10/2015    Heart failure, left, with LVEF 41-49% (HCC)     Hypertension 05/30/2012    Benign Essential    Infectious mononucleosis     Resolved: 8/19/2015    Lateral epicondylitis 02/17/2011    unspecified laterality    LBBB (left bundle branch block)     LBBB (left bundle branch block) 2/8/2018    Lipoma     Last Assessesd: 10/13/2014    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    Psychiatric disorder     Sialodochitis 06/01/2010    Situational anxiety     Last Assessed: 10/22/2015    Sleep apnea     Tongue disorder     Last Assessed: 6/9/2016    Type 2 diabetes mellitus with kidney complication, without long-term current use of insulin ([place] [address] )      Past Surgical History:   Procedure Laterality Date    CERVICAL BIOPSY  W/ LOOP ELECTRODE EXCISION  1992    CHOLECYSTECTOMY      COLONOSCOPY  1997    ESSURE TUBAL LIGATION  2010   [address] [facility]    GYNECOLOGIC CRYOSURGERY  1992     Allergies   Allergen Reactions    Vioxx [Rofecoxib] Swelling     Current Outpatient Medications on File Prior to Visit   Medication Sig Dispense Refill    albuterol (PROAIR HFA) 90 mcg/act inhaler Inhale 1-2 puffs      atorvastatin (LIPITOR) 10 mg tablet TAKE 1 TABLET DAILY 90 tablet 1    carvedilol (COREG) 6 25 mg tablet TAKE 1 TABLET TWICE A DAY WITH MEALS 180 tablet 3    fluconazole (DIFLUCAN) 150 mg tablet Take 1 tablet (150 mg total) by mouth every other day for 2 doses 2 tablet 0    Januvia 100 MG tablet TAKE 1 TABLET DAILY (REPLACES TRADJENTA) 90 tablet 3    Jardiance 10 MG TABS TAKE 1 TABLET EVERY MORNING 90 tablet 3    metFORMIN (GLUCOPHAGE-XR) 500 mg 24 hr tablet TAKE 1 TABLET(500 MG) BY MOUTH TWICE DAILY WITH MEALS AS DIRECTED 60 tablet 5    sacubitril-valsartan (Entresto) 24-26 MG TABS AT THE START OF THERAPY TAKE 1 TABLET DAILY FOR 1 WEEK THEN INCREASE TO 1 TABLET TWICE A DAY THEREAFTER 180 tablet 3    spironolactone (ALDACTONE) 25 mg tablet TAKE 1 TABLET DAILY 90 tablet 3    torsemide (DEMADEX) 10 mg tablet Once a day on Mon-Friday- Sunday morning 45 tablet 1     No current facility-administered medications on file prior to visit  Objective:    Review of Systems   Constitutional: Negative for chills, fever and unexpected weight change  HENT: Negative for hearing loss, nosebleeds and postnasal drip  Eyes: Negative for pain, redness and visual disturbance  Respiratory: Negative for cough, shortness of breath and wheezing  Cardiovascular: Negative for chest pain, palpitations and leg swelling  Gastrointestinal: Negative for abdominal pain, nausea and vomiting  Endocrine: Negative for polydipsia and polyuria  Genitourinary: Negative for dysuria  Musculoskeletal: Positive for arthralgias and joint swelling  Skin: Negative for rash and wound  Neurological: Negative for dizziness, numbness and headaches  Psychiatric/Behavioral: Negative for decreased concentration and suicidal ideas  The patient is not nervous/anxious  Right Shoulder Exam     Range of Motion   Right shoulder active abduction: 180  Internal rotation 0 degrees: T8   Right shoulder internal rotation 90 degrees: hip region      Comments:  Passive motion   90° external rotation   Internal rotation 80   90° abduction        Left Shoulder Exam     Range of Motion   Active abduction: 80   Forward flexion: 110     Muscle Strength   The patient has normal left shoulder strength      Other   Erythema: absent  Scars: absent     Comments:  Internal rotation to her hip region    Passive abduction 40°, external rotation 80°, internal rotation 10°            Physical Exam  Constitutional:       Appearance: She is well-developed  HENT:      Head: Normocephalic and atraumatic  Eyes:      Pupils: Pupils are equal, round, and reactive to light  Cardiovascular:      Rate and Rhythm: Normal rate and regular rhythm  Pulmonary:      Effort: Pulmonary effort is normal       Breath sounds: Normal breath sounds  Musculoskeletal:      Cervical back: Neck supple  Skin:     General: Skin is warm and dry  Neurological:      Mental Status: She is alert and oriented to person, place, and time  Psychiatric:         Behavior: Behavior normal          Thought Content: Thought content normal          Procedures  No Procedures performed today    I have personally reviewed pertinent films in PACS and my interpretation is x-ray left shoulder demonstrates mild AC joint arthritis, no fractures or dislocations   Scribe Attestation    I,:  James Ascencio am acting as a scribe while in the presence of the attending physician :       I,:  Akshat Garcia DO personally performed the services described in this documentation    as scribed in my presence :           Portions of the record may have been created with voice recognition software   Occasional wrong word or "sound a like" substitutions may have occurred due to the inherent limitations of voice recognition software   Read the chart carefully and recognize, using context, where substitutions have occurred

## 2022-04-26 DIAGNOSIS — E78.49 OTHER HYPERLIPIDEMIA: ICD-10-CM

## 2022-04-26 RX ORDER — ATORVASTATIN CALCIUM 10 MG/1
TABLET, FILM COATED ORAL
Qty: 90 TABLET | Refills: 1 | Status: SHIPPED | OUTPATIENT
Start: 2022-04-26

## 2022-05-10 ENCOUNTER — RA CDI HCC (OUTPATIENT)
Dept: OTHER | Facility: HOSPITAL | Age: 57
End: 2022-05-10

## 2022-05-10 NOTE — PROGRESS NOTES
[facility]  coding opportunities          Chart Reviewed number of suggestions sent to Provider: 1     Patients Insurance        Commercial Insurance: [employer]       appt on 5/17/22      Found in active problem - please assess for 2022    E66 01: Morbid (severe) obesity due to excess calories ([facility] ) - Per CMS/ICD 10 coding guidelines, to be used when BMI >=40

## 2022-05-20 LAB
ALBUMIN SERPL-MCNC: 4.6 G/DL (ref 3.8–4.9)
ALBUMIN/GLOB SERPL: 1.4 {RATIO} (ref 1.2–2.2)
ALP SERPL-CCNC: 82 IU/L (ref 44–121)
ALT SERPL-CCNC: 16 IU/L (ref 0–32)
AST SERPL-CCNC: 14 IU/L (ref 0–40)
BILIRUB SERPL-MCNC: 0.9 MG/DL (ref 0–1.2)
BUN SERPL-MCNC: 14 MG/DL (ref 6–24)
BUN/CREAT SERPL: 18 (ref 9–23)
CALCIUM SERPL-MCNC: 9.4 MG/DL (ref 8.7–10.2)
CHLORIDE SERPL-SCNC: 101 MMOL/L (ref 96–106)
CO2 SERPL-SCNC: 24 MMOL/L (ref 20–29)
CREAT SERPL-MCNC: 0.78 MG/DL (ref 0.57–1)
EGFR: 90 ML/MIN/1.73
GLOBULIN SER-MCNC: 3.2 G/DL (ref 1.5–4.5)
GLUCOSE SERPL-MCNC: 109 MG/DL (ref 65–99)
HBA1C MFR BLD: 5.7 % (ref 4.8–5.6)
POTASSIUM SERPL-SCNC: 4.3 MMOL/L (ref 3.5–5.2)
PROT SERPL-MCNC: 7.8 G/DL (ref 6–8.5)
SODIUM SERPL-SCNC: 140 MMOL/L (ref 134–144)

## 2022-05-20 PROCEDURE — 3044F HG A1C LEVEL LT 7.0%: CPT | Performed by: FAMILY MEDICINE

## 2022-05-24 ENCOUNTER — OFFICE VISIT (OUTPATIENT)
Dept: FAMILY MEDICINE CLINIC | Facility: CLINIC | Age: 57
End: 2022-05-24
Payer: COMMERCIAL

## 2022-05-24 VITALS
HEIGHT: 66 IN | TEMPERATURE: 97.3 F | DIASTOLIC BLOOD PRESSURE: 64 MMHG | RESPIRATION RATE: 16 BRPM | SYSTOLIC BLOOD PRESSURE: 108 MMHG | WEIGHT: 293 LBS | HEART RATE: 86 BPM | BODY MASS INDEX: 47.09 KG/M2 | OXYGEN SATURATION: 98 %

## 2022-05-24 DIAGNOSIS — Z12.11 COLON CANCER SCREENING: ICD-10-CM

## 2022-05-24 DIAGNOSIS — E11.22 CKD STAGE 1 DUE TO TYPE 2 DIABETES MELLITUS (HCC): ICD-10-CM

## 2022-05-24 DIAGNOSIS — E78.49 OTHER HYPERLIPIDEMIA: ICD-10-CM

## 2022-05-24 DIAGNOSIS — E11.8 TYPE 2 DIABETES MELLITUS WITH COMPLICATION, WITHOUT LONG-TERM CURRENT USE OF INSULIN (HCC): ICD-10-CM

## 2022-05-24 DIAGNOSIS — E11.22 TYPE 2 DIABETES MELLITUS WITH STAGE 1 CHRONIC KIDNEY DISEASE, WITHOUT LONG-TERM CURRENT USE OF INSULIN (HCC): Primary | ICD-10-CM

## 2022-05-24 DIAGNOSIS — N18.1 TYPE 2 DIABETES MELLITUS WITH STAGE 1 CHRONIC KIDNEY DISEASE, WITHOUT LONG-TERM CURRENT USE OF INSULIN (HCC): Primary | ICD-10-CM

## 2022-05-24 DIAGNOSIS — N18.1 CKD STAGE 1 DUE TO TYPE 2 DIABETES MELLITUS (HCC): ICD-10-CM

## 2022-05-24 DIAGNOSIS — E66.01 MORBID OBESITY (HCC): ICD-10-CM

## 2022-05-24 PROCEDURE — 3066F NEPHROPATHY DOC TX: CPT | Performed by: FAMILY MEDICINE

## 2022-05-24 PROCEDURE — 99214 OFFICE O/P EST MOD 30 MIN: CPT | Performed by: FAMILY MEDICINE

## 2022-05-24 PROCEDURE — 3725F SCREEN DEPRESSION PERFORMED: CPT | Performed by: FAMILY MEDICINE

## 2022-05-24 PROCEDURE — 1036F TOBACCO NON-USER: CPT | Performed by: FAMILY MEDICINE

## 2022-05-24 PROCEDURE — 3074F SYST BP LT 130 MM HG: CPT | Performed by: FAMILY MEDICINE

## 2022-05-24 PROCEDURE — 3078F DIAST BP <80 MM HG: CPT | Performed by: FAMILY MEDICINE

## 2022-05-24 PROCEDURE — 3008F BODY MASS INDEX DOCD: CPT | Performed by: FAMILY MEDICINE

## 2022-05-24 NOTE — PROGRESS NOTES
Assessment/Plan:    No problem-specific Assessment & Plan notes found for this encounter  DM2 very well controlled, will try DC of metformin 500mg bid and continue jardiance and januvia  a1c in 3m, goals aware    Continue weight loss success    ckd1 stable, stay hydrated    Left frozen shoulder, PT recommendations aware but economic issues    HLD stable on statin for risk reduction     Diagnoses and all orders for this visit:    Type 2 diabetes mellitus with stage 1 chronic kidney disease, without long-term current use of insulin ([facility])    Colon cancer screening    Type 2 diabetes mellitus with complication, without long-term current use of insulin ([facility])  -     sitaGLIPtin (Januvia) 100 mg tablet; Take 1 tablet (100 mg total) by mouth in the morning   -     Empagliflozin (Jardiance) 10 MG TABS; Take 1 tablet (10 mg total) by mouth every morning  -     Comprehensive metabolic panel; Future  -     Hemoglobin A1C; Future    Other hyperlipidemia    CKD stage 1 due to type 2 diabetes mellitus ([facility] )    Morbid obesity ([facility])    BMI 45 0-49 9, adult ([facility] )              Return in about 3 months (around 8/31/2022) for Recheck  Subjective:      Patient ID: Sheela Ritchie is a 49 y o  female  Chief Complaint   Patient presents with    Follow-up     Christen Luis MA         HPI  No freq vaginitis on jardiance  Lost more weight  Exercises 1 hr daily  Lost 10#  Gained muscle mass  Going to gym  Feels better    Left frozen shoulder  Seeing orthopedics  Not going to PT due to cost issues    fbs 109  a1c 5 7    The following portions of the patient's history were reviewed and updated as appropriate: allergies, current medications, past family history, past medical history, past social history, past surgical history and problem list     Review of Systems   Constitutional: Negative for chills and fever           Current Outpatient Medications   Medication Sig Dispense Refill    albuterol (PROVENTIL HFA,VENTOLIN HFA) 90 mcg/act inhaler Inhale 1-2 puffs      atorvastatin (LIPITOR) 10 mg tablet TAKE 1 TABLET DAILY 90 tablet 1    carvedilol (COREG) 6 25 mg tablet TAKE 1 TABLET TWICE A DAY WITH MEALS 180 tablet 3    Empagliflozin (Jardiance) 10 MG TABS Take 1 tablet (10 mg total) by mouth every morning 90 tablet 1    sacubitril-valsartan (Entresto) 24-26 MG TABS AT THE START OF THERAPY TAKE 1 TABLET DAILY FOR 1 WEEK THEN INCREASE TO 1 TABLET TWICE A DAY THEREAFTER 180 tablet 3    sitaGLIPtin (Januvia) 100 mg tablet Take 1 tablet (100 mg total) by mouth in the morning  90 tablet 1    spironolactone (ALDACTONE) 25 mg tablet TAKE 1 TABLET DAILY 90 tablet 3    torsemide (DEMADEX) 10 mg tablet Once a day on Mon-Friday- Sunday morning 45 tablet 1     No current facility-administered medications for this visit  Objective:    /64   Pulse 86   Temp (!) 97 3 °F (36 3 °C)   Resp 16   Ht 5' 5 75" (1 67 m)   Wt 133 kg (293 lb)   SpO2 98%   BMI 47 65 kg/m²        Physical Exam  Vitals and nursing note reviewed  Constitutional:       General: She is not in acute distress  Appearance: She is well-developed  She is obese  She is not ill-appearing  HENT:      Head: Normocephalic  Right Ear: Tympanic membrane normal       Left Ear: Tympanic membrane normal    Eyes:      General: No scleral icterus  Conjunctiva/sclera: Conjunctivae normal    Cardiovascular:      Rate and Rhythm: Normal rate and regular rhythm  Pulmonary:      Effort: Pulmonary effort is normal  No respiratory distress  Breath sounds: No wheezing  Abdominal:      General: There is no distension  Palpations: Abdomen is soft  Tenderness: There is no abdominal tenderness  Musculoskeletal:         General: No deformity  Cervical back: Neck supple  Right lower leg: No edema  Left lower leg: No edema  Skin:     General: Skin is warm and dry  Coloration: Skin is not jaundiced or pale     Neurological:      Mental Status: She is alert  Motor: No weakness  Gait: Gait normal    Psychiatric:         Mood and Affect: Mood normal          Behavior: Behavior normal          Thought Content:  Thought content normal                 Erik Danielle DO

## 2022-06-01 ENCOUNTER — OFFICE VISIT (OUTPATIENT)
Dept: FAMILY MEDICINE CLINIC | Facility: CLINIC | Age: 57
End: 2022-06-01
Payer: COMMERCIAL

## 2022-06-01 VITALS
TEMPERATURE: 97.4 F | DIASTOLIC BLOOD PRESSURE: 64 MMHG | SYSTOLIC BLOOD PRESSURE: 98 MMHG | OXYGEN SATURATION: 97 % | RESPIRATION RATE: 16 BRPM | HEIGHT: 66 IN | BODY MASS INDEX: 47.65 KG/M2 | HEART RATE: 84 BPM

## 2022-06-01 DIAGNOSIS — J02.9 ACUTE PHARYNGITIS, UNSPECIFIED ETIOLOGY: Primary | ICD-10-CM

## 2022-06-01 DIAGNOSIS — E11.22 TYPE 2 DIABETES MELLITUS WITH STAGE 1 CHRONIC KIDNEY DISEASE, WITHOUT LONG-TERM CURRENT USE OF INSULIN (HCC): ICD-10-CM

## 2022-06-01 DIAGNOSIS — E78.5 HYPERLIPIDEMIA, UNSPECIFIED HYPERLIPIDEMIA TYPE: ICD-10-CM

## 2022-06-01 DIAGNOSIS — N18.1 TYPE 2 DIABETES MELLITUS WITH STAGE 1 CHRONIC KIDNEY DISEASE, WITHOUT LONG-TERM CURRENT USE OF INSULIN (HCC): ICD-10-CM

## 2022-06-01 DIAGNOSIS — R60.0 BILATERAL LEG EDEMA: ICD-10-CM

## 2022-06-01 LAB — S PYO AG THROAT QL: NEGATIVE

## 2022-06-01 PROCEDURE — 1036F TOBACCO NON-USER: CPT | Performed by: NURSE PRACTITIONER

## 2022-06-01 PROCEDURE — 3066F NEPHROPATHY DOC TX: CPT | Performed by: NURSE PRACTITIONER

## 2022-06-01 PROCEDURE — 3074F SYST BP LT 130 MM HG: CPT | Performed by: NURSE PRACTITIONER

## 2022-06-01 PROCEDURE — 3078F DIAST BP <80 MM HG: CPT | Performed by: NURSE PRACTITIONER

## 2022-06-01 PROCEDURE — 99214 OFFICE O/P EST MOD 30 MIN: CPT | Performed by: NURSE PRACTITIONER

## 2022-06-01 PROCEDURE — 87880 STREP A ASSAY W/OPTIC: CPT | Performed by: NURSE PRACTITIONER

## 2022-06-01 RX ORDER — AMOXICILLIN 875 MG/1
875 TABLET, COATED ORAL 2 TIMES DAILY
Qty: 20 TABLET | Refills: 0 | Status: SHIPPED | OUTPATIENT
Start: 2022-06-01 | End: 2022-06-11

## 2022-06-01 NOTE — PROGRESS NOTES
Assessment/Plan:    1  Acute pharyngitis, unspecified etiology  -     amoxicillin (AMOXIL) 875 mg tablet; Take 1 tablet (875 mg total) by mouth 2 (two) times a day for 10 days  -     POCT rapid strepA    2  Type 2 diabetes mellitus with stage 1 chronic kidney disease, without long-term current use of insulin ([facility])  Comments:  complaint with regimen and managed by PCP and last HBa1c at goal    3  Bilateral leg edema  Comments:  stable with current regimen    4  Hyperlipidemia, unspecified hyperlipidemia type  Comments:  complaint with statin and tolerating it well          Patient Instructions: Take medication with food  It is important that you take the entire course of antibiotics prescribed  May also take a probiotic of your choice to maintain healthy GI fidelia  Can take some probiotic and yogurt with the medication  Gargle with warm salt water for 5 minutes every 4 hours  Drink plenty of fluids at least 6 glasses of water a day  Can use some honey lemon tea  Call or follow up if symptoms are not better in 7 days  Supportive care discussed and advised  Advised to RTO for any worsening and no improvement  Follow up for no improvement and worsening of conditions  Patient advised and educated when to see immediate medical care  Return if symptoms worsen or fail to improve  Future Appointments   Date Time Provider Mariann Dean   6/7/2022  5:30 PM Chu Begum PA-C [facility]   8/30/2022  5:15 PM Andrea Villa DO [facility]           Subjective:      Patient ID: Mercedes Loo is a 49 y o  female  Chief Complaint   Patient presents with    Sore Throat     Started yesterday  Negative at home Covid tests today and yesterday    Mz cma         Vitals:  BP 98/64   Pulse 84   Temp (!) 97 4 °F (36 3 °C)   Resp 16   Ht 5' 5 75" (1 67 m)   SpO2 97%   BMI 47 65 kg/m²     HPI  Patient stated that started with sore throat on 5/31/2022 and feeling mild fatigue with body aches  Denies fever, chills and sob  Did home covid-19 test yesterday and today which come back negative  Patient dose not want covid-19 PCR test at this time  Stated that noticed white spots on her tonsils  Complaint with medications for chronic illnesses and tolerating it well  The following portions of the patient's history were reviewed and updated as appropriate: allergies, current medications, past family history, past medical history, past social history, past surgical history and problem list       Review of Systems   Constitutional: Positive for fatigue  Negative for chills, diaphoresis, fever and unexpected weight change  HENT: Positive for sore throat  Negative for congestion, dental problem, drooling, ear discharge, ear pain, facial swelling, hearing loss, mouth sores, nosebleeds, postnasal drip, rhinorrhea, sinus pressure, sinus pain, sneezing, tinnitus, trouble swallowing and voice change  Respiratory: Negative for cough, chest tightness, shortness of breath and wheezing  Cardiovascular: Negative  Gastrointestinal: Negative for abdominal pain, constipation, diarrhea, nausea and vomiting  Musculoskeletal: Positive for myalgias  Skin: Negative  Neurological: Negative for dizziness, light-headedness and headaches  Objective:    Social History     Tobacco Use   Smoking Status Never Smoker   Smokeless Tobacco Never Used   Tobacco Comment    N/a       Allergies:    Allergies   Allergen Reactions    Vioxx [Rofecoxib] Swelling         Current Outpatient Medications   Medication Sig Dispense Refill    albuterol (PROVENTIL HFA,VENTOLIN HFA) 90 mcg/act inhaler Inhale 1-2 puffs      amoxicillin (AMOXIL) 875 mg tablet Take 1 tablet (875 mg total) by mouth 2 (two) times a day for 10 days 20 tablet 0    atorvastatin (LIPITOR) 10 mg tablet TAKE 1 TABLET DAILY 90 tablet 1    carvedilol (COREG) 6 25 mg tablet TAKE 1 TABLET TWICE A DAY WITH MEALS 180 tablet 3    Empagliflozin (Jardiance) 10 MG TABS Take 1 tablet (10 mg total) by mouth every morning 90 tablet 1    sacubitril-valsartan (Entresto) 24-26 MG TABS AT THE START OF THERAPY TAKE 1 TABLET DAILY FOR 1 WEEK THEN INCREASE TO 1 TABLET TWICE A DAY THEREAFTER 180 tablet 3    sitaGLIPtin (Januvia) 100 mg tablet Take 1 tablet (100 mg total) by mouth in the morning  90 tablet 1    spironolactone (ALDACTONE) 25 mg tablet TAKE 1 TABLET DAILY 90 tablet 3    torsemide (DEMADEX) 10 mg tablet Once a day on Mon-Friday- Sunday morning 45 tablet 1     No current facility-administered medications for this visit  Physical Exam  Vitals reviewed  Constitutional:       Appearance: She is well-developed  She is obese  HENT:      Head: Normocephalic  Right Ear: Tympanic membrane, ear canal and external ear normal       Left Ear: Tympanic membrane, ear canal and external ear normal       Nose: Nose normal       Right Sinus: No maxillary sinus tenderness or frontal sinus tenderness  Left Sinus: No maxillary sinus tenderness or frontal sinus tenderness  Mouth/Throat:      Mouth: No oral lesions  Pharynx: Oropharyngeal exudate and posterior oropharyngeal erythema present  Cardiovascular:      Rate and Rhythm: Normal rate and regular rhythm  Heart sounds: Normal heart sounds  Pulmonary:      Effort: Pulmonary effort is normal       Breath sounds: Normal breath sounds  Musculoskeletal:         General: Normal range of motion  Cervical back: Neck supple  Lymphadenopathy:      Cervical:      Right cervical: No superficial or posterior cervical adenopathy  Left cervical: No superficial or posterior cervical adenopathy  Skin:     General: Skin is warm and dry  Neurological:      Mental Status: She is alert and oriented to person, place, and time  Psychiatric:         Behavior: Behavior normal          Thought Content:  Thought content normal          Judgment: Judgment normal              Recent Results (from the past 24 hour(s))   POCT rapid strepA    Collection Time: 06/01/22 12:37 PM   Result Value Ref Range     RAPID STREP A Negative Negative             ZOE Ortega

## 2022-06-01 NOTE — PATIENT INSTRUCTIONS
Take medication with food  It is important that you take the entire course of antibiotics prescribed  May also take a probiotic of your choice to maintain healthy GI fidelia  Can take some probiotic and yogurt with the medication  Gargle with warm salt water for 5 minutes every 4 hours  Drink plenty of fluids at least 6 glasses of water a day  Can use some honey lemon tea  Call or follow up if symptoms are not better in 7 days  Supportive care discussed and advised  Advised to RTO for any worsening and no improvement  Follow up for no improvement and worsening of conditions  Patient advised and educated when to see immediate medical care

## 2022-06-07 ENCOUNTER — ANNUAL EXAM (OUTPATIENT)
Dept: OBGYN CLINIC | Facility: CLINIC | Age: 57
End: 2022-06-07
Payer: COMMERCIAL

## 2022-06-07 VITALS
WEIGHT: 293 LBS | SYSTOLIC BLOOD PRESSURE: 100 MMHG | BODY MASS INDEX: 44.41 KG/M2 | HEIGHT: 68 IN | DIASTOLIC BLOOD PRESSURE: 70 MMHG

## 2022-06-07 DIAGNOSIS — Z12.31 ENCOUNTER FOR SCREENING MAMMOGRAM FOR BREAST CANCER: ICD-10-CM

## 2022-06-07 DIAGNOSIS — Z01.419 GYNECOLOGIC EXAM NORMAL: Primary | ICD-10-CM

## 2022-06-07 DIAGNOSIS — N92.6 IRREGULAR BLEEDING: ICD-10-CM

## 2022-06-07 DIAGNOSIS — N92.4 ABNORMAL PERIMENOPAUSAL BLEEDING: ICD-10-CM

## 2022-06-07 PROCEDURE — S0612 ANNUAL GYNECOLOGICAL EXAMINA: HCPCS | Performed by: PHYSICIAN ASSISTANT

## 2022-06-07 PROCEDURE — 3008F BODY MASS INDEX DOCD: CPT | Performed by: NURSE PRACTITIONER

## 2022-06-07 NOTE — ASSESSMENT & PLAN NOTE
Pap guidelines reviewed  Pap deferred secondary to negative pap and HPV in 2020 in this low risk patient  Continue yearly mammograms, script given  Reviewed colonoscopy guidelines, patient planning on scheduling  Reviewed perimenopausal bleeding  Will plan labs to see if in menopausal range as well as pelvic ultrasound to evaluate endometrial stripe  Return to office for annual or as needed

## 2022-06-07 NOTE — PROGRESS NOTES
Assessment/Plan   Problem List Items Addressed This Visit        Other    Gynecologic exam normal - Primary     Pap guidelines reviewed  Pap deferred secondary to negative pap and HPV in 2020 in this low risk patient  Continue yearly mammograms, script given  Reviewed colonoscopy guidelines, patient planning on scheduling  Reviewed perimenopausal bleeding  Will plan labs to see if in menopausal range as well as pelvic ultrasound to evaluate endometrial stripe  Return to office for annual or as needed  Other Visit Diagnoses     Irregular bleeding        Relevant Orders    Follicle stimulating hormone    Luteinizing hormone    Encounter for screening mammogram for breast cancer        Relevant Orders    Mammo screening bilateral w 3d & cad    Abnormal perimenopausal bleeding        Relevant Orders    US pelvis complete w transvaginal          Subjective:     Patient ID: Andrew Morton is a 49 y o  y o  female  HPI  50 yo seen for annual exam  Reports had 3 days of spotting in 11/2021, prior to that went 11 months without a menses  Reports sees her boyfriend once every 2 weeks and typically the first time they have intercourse will have spotting, the rest of the weekend doesn't have spotting  Denies pain with intercourse that isn't resolved with positional change  Denies vaginal dryness  Denies bowel or bladder issues  Last pap: 8/6/2020 NILM, (-)HRHPV  Last mammogram: 12/14/2021 BIRADS 2: Benign  Last colonoscopy: Has not had done yet     The following portions of the patient's history were reviewed and updated as appropriate:   She  has a past medical history of Achilles tendinitis, unspecified leg (12/14/2006), Anxiety, Arthritis, Asthma, Bilateral leg edema, BMI 50 0-59 9, adult ([facility] ), Breast lump (07/11/2008), Chest pain on breathing, Chronic kidney disease, stage 1, Clotting disorder ([facility] ), Diabetes mellitus ([facility] ), Eczema, Environmental allergies, Exposure to potentially hazardous body fluids, Heart failure, left, with LVEF 41-49% ([facility] [facility] ), Hypertension (05/30/2012), Infectious mononucleosis, Lateral epicondylitis (02/17/2011), LBBB (left bundle branch block), LBBB (left bundle branch block) (2/8/2018), Lipoma, 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), Psychiatric disorder, Sialodochitis (06/01/2010), Situational anxiety, Sleep apnea, Tongue disorder, and Type 2 diabetes mellitus with kidney complication, without long-term current use of insulin ([facility] )    She   Patient Active Problem List    Diagnosis Date Noted    BMI 45 0-49 9, adult ([facility] ) 05/24/2022    Adhesive capsulitis of left shoulder 03/23/2022    Rotator cuff tendinitis, left 03/23/2022    Biceps tendinitis on left 03/23/2022    Vaginal yeast infection 01/10/2022    Hyperlipidemia 11/10/2021    Strain of long head of left biceps 10/15/2021    Bilateral thigh pain 10/15/2021    Myalgia 08/10/2021    Word finding difficulty 04/13/2021    Memory difficulties 04/13/2021    History of 2019 novel coronavirus disease (COVID-19) 04/10/2021   [address] maintenance 10/01/2020    Routine gynecological examination 08/06/2020    Breast cancer screening 03/02/2020    Immunization due 03/02/2020    Rash 12/11/2019    Other hyperlipidemia 08/19/2019    Colon cancer screening 11/02/2018    Gynecologic exam normal 05/27/2018    Type 2 diabetes mellitus with stage 1 chronic kidney disease, without long-term current use of insulin ([facility] ) 05/14/2018    Allergic rhinitis due to allergen 05/14/2018    Screening for cardiovascular, respiratory, and genitourinary diseases 04/23/2018    Bilateral leg edema 02/08/2018    LBBB (left bundle branch block) 02/08/2018    Low back pain 01/10/2018    CKD stage 1 due to type 2 diabetes mellitus ([address] ) 11/06/2016    Heart failure, left, with LVEF 41-49% ([facility] ) 08/10/2016    ARMANDO (obstructive sleep apnea) 07/22/2016    Morbid obesity ([facility] ) 07/22/2016    Chronic eczema 06/09/2016    Degenerative arthritis of thoracic spine 02/13/2015    Hypertensive heart and kidney disease with HF and with CKD stage I-IV ([facility] ) 09/04/2012    Generalized anxiety disorder 09/04/2012     She  has a past surgical history that includes Cholecystectomy; Colonoscopy (1997); Gallbladder surgery (1996); Essure tubal ligation (2010); Cervical biopsy w/ loop electrode excision (1992); and Gynecologic cryosurgery (1992)  Her family history includes Arthritis in her family, father, and sister; Atrial fibrillation in her father; Bone cancer in her maternal grandfather; Brain cancer in her paternal grandfather; Breast cancer (age of onset: 61) in her maternal grandmother; Diabetes type II in her mother; Heart attack in her mother; Heart disease in her father and maternal grandmother; No Known Problems in her maternal aunt; Other in her other  She  reports that she has never smoked  She has never used smokeless tobacco  She reports that she does not drink alcohol and does not use drugs  Current Outpatient Medications   Medication Sig Dispense Refill    albuterol (PROVENTIL HFA,VENTOLIN HFA) 90 mcg/act inhaler Inhale 1-2 puffs      amoxicillin (AMOXIL) 875 mg tablet Take 1 tablet (875 mg total) by mouth 2 (two) times a day for 10 days 20 tablet 0    atorvastatin (LIPITOR) 10 mg tablet TAKE 1 TABLET DAILY 90 tablet 1    carvedilol (COREG) 6 25 mg tablet TAKE 1 TABLET TWICE A DAY WITH MEALS 180 tablet 3    Empagliflozin (Jardiance) 10 MG TABS Take 1 tablet (10 mg total) by mouth every morning 90 tablet 1    sacubitril-valsartan (Entresto) 24-26 MG TABS AT THE START OF THERAPY TAKE 1 TABLET DAILY FOR 1 WEEK THEN INCREASE TO 1 TABLET TWICE A DAY THEREAFTER 180 tablet 3    sitaGLIPtin (Januvia) 100 mg tablet Take 1 tablet (100 mg total) by mouth in the morning   90 tablet 1    spironolactone (ALDACTONE) 25 mg tablet TAKE 1 TABLET DAILY 90 tablet 3    torsemide (DEMADEX) 10 mg tablet Once a day on Mon-Friday- Sunday morning 45 tablet 1     No current facility-administered medications for this visit  She is allergic to vioxx [rofecoxib]       Menstrual History:  OB History        3    Para   3    Term   2       1    AB        Living   2       SAB        IAB        Ectopic        Multiple        Live Births   2           Obstetric Comments   Pregnancy complicated by Diabetes Mellitus 2005                No LMP recorded  Patient is premenopausal          Review of Systems   Constitutional: Negative for fatigue, fever and unexpected weight change  HENT: Negative for dental problem and sinus pressure  Eyes: Negative for visual disturbance  Respiratory: Negative for cough, shortness of breath and wheezing  Cardiovascular: Negative for chest pain  Gastrointestinal: Negative for abdominal pain, blood in stool, constipation, diarrhea, nausea and vomiting  Endocrine: Negative for polydipsia  Genitourinary: Negative for difficulty urinating, dyspareunia, dysuria, frequency, hematuria, pelvic pain and urgency  Musculoskeletal: Negative for arthralgias and back pain  Neurological: Negative for dizziness, seizures, light-headedness and headaches  Psychiatric/Behavioral: Negative for suicidal ideas  The patient is not nervous/anxious  Objective:  Vitals:    22 1719   BP: 100/70   BP Location: Right arm   Patient Position: Sitting   Cuff Size: Large   Weight: 135 kg (297 lb 9 6 oz)   Height: 5' 8" (1 727 m)      Physical Exam  Constitutional:       Appearance: Normal appearance  She is well-developed  Genitourinary:      Vulva and bladder normal       No lesions in the vagina  Right Labia: No rash, tenderness, lesions or skin changes  Left Labia: No tenderness, lesions, skin changes or rash  No labial fusion noted  No inguinal adenopathy present in the right or left side  No vaginal discharge, erythema, tenderness or bleeding  Right Adnexa: not tender, not full and no mass present  Left Adnexa: not tender, not full and no mass present  No cervical motion tenderness, discharge or lesion  Uterus is not enlarged, tender or irregular  No uterine mass detected  No urethral prolapse, tenderness or mass present  Bladder is not tender  Breasts: Breasts are symmetrical       Right: No swelling, bleeding, inverted nipple, mass, nipple discharge, skin change, tenderness, axillary adenopathy or supraclavicular adenopathy  Left: No swelling, bleeding, inverted nipple, mass, nipple discharge, skin change, tenderness, axillary adenopathy or supraclavicular adenopathy  HENT:      Head: Normocephalic and atraumatic  Neck:      Thyroid: No thyromegaly  Cardiovascular:      Rate and Rhythm: Normal rate and regular rhythm  Heart sounds: Normal heart sounds  No murmur heard  No friction rub  No gallop  Pulmonary:      Effort: Pulmonary effort is normal  No respiratory distress  Breath sounds: Normal breath sounds  No wheezing  Abdominal:      General: There is no distension  Palpations: Abdomen is soft  There is no mass  Tenderness: There is no abdominal tenderness  There is no guarding or rebound  Hernia: No hernia is present  Lymphadenopathy:      Cervical: No cervical adenopathy  Upper Body:      Right upper body: No supraclavicular, axillary or pectoral adenopathy  Left upper body: No supraclavicular, axillary or pectoral adenopathy  Lower Body: No right inguinal adenopathy  No left inguinal adenopathy  Neurological:      Mental Status: She is alert and oriented to person, place, and time  Skin:     General: Skin is warm and dry     Psychiatric:         Behavior: Behavior normal

## 2022-07-14 ENCOUNTER — HOSPITAL ENCOUNTER (OUTPATIENT)
Dept: ULTRASOUND IMAGING | Facility: HOSPITAL | Age: 57
Discharge: HOME | End: 2022-07-14
Attending: PHYSICIAN ASSISTANT
Payer: COMMERCIAL

## 2022-07-14 DIAGNOSIS — N92.4 ABNORMAL PERIMENOPAUSAL BLEEDING: ICD-10-CM

## 2022-07-14 PROCEDURE — 76830 TRANSVAGINAL US NON-OB: CPT

## 2022-07-14 PROCEDURE — 76856 US EXAM PELVIC COMPLETE: CPT

## 2022-07-19 ENCOUNTER — TELEPHONE (OUTPATIENT)
Dept: OBGYN CLINIC | Facility: CLINIC | Age: 57
End: 2022-07-19

## 2022-07-19 NOTE — TELEPHONE ENCOUNTER
Reviewed results and provider recommendations with pt  Pt states she would prefer to wait at this time and continue to monitor and will call back with any questions or concerns

## 2022-07-21 ENCOUNTER — TELEPHONE (OUTPATIENT)
Dept: OBGYN CLINIC | Facility: CLINIC | Age: 57
End: 2022-07-21

## 2022-07-21 DIAGNOSIS — B37.31 VAGINAL YEAST INFECTION: Primary | ICD-10-CM

## 2022-07-21 NOTE — TELEPHONE ENCOUNTER
Pt lmom - states is diabetic and on a medication that she gets frequent yeast infections and has been good for awhile but is starting to get one now and was hoping to get a script called in for it because the OTC stuff doesn't help her   Uses [facility] on [address]

## 2022-07-22 RX ORDER — FLUCONAZOLE 150 MG/1
150 TABLET ORAL ONCE
Qty: 1 TABLET | Refills: 0 | Status: SHIPPED | OUTPATIENT
Start: 2022-07-22 | End: 2022-07-22

## 2022-08-23 ENCOUNTER — RA CDI HCC (OUTPATIENT)
Dept: OTHER | Facility: HOSPITAL | Age: 57
End: 2022-08-23

## 2022-08-23 NOTE — PROGRESS NOTES
[facility]  coding opportunities       Chart reviewed, no opportunity found: CHART REVIEWED, NO OPPORTUNITY FOUND     Patients Insurance     Commercial Insurance: [address] [place] [address]

## 2022-08-29 DIAGNOSIS — R60.0 BILATERAL LEG EDEMA: ICD-10-CM

## 2022-08-29 DIAGNOSIS — I44.7 LBBB (LEFT BUNDLE BRANCH BLOCK): ICD-10-CM

## 2022-08-30 RX ORDER — CARVEDILOL 6.25 MG/1
6.25 TABLET ORAL 2 TIMES DAILY WITH MEALS
Qty: 180 TABLET | Refills: 0 | Status: SHIPPED | OUTPATIENT
Start: 2022-08-30 | End: 2022-10-04 | Stop reason: SDUPTHER

## 2022-08-30 RX ORDER — SPIRONOLACTONE 25 MG/1
25 TABLET ORAL DAILY
Qty: 90 TABLET | Refills: 0 | Status: SHIPPED | OUTPATIENT
Start: 2022-08-30 | End: 2022-10-04 | Stop reason: SDUPTHER

## 2022-10-04 ENCOUNTER — OFFICE VISIT (OUTPATIENT)
Dept: CARDIOLOGY CLINIC | Facility: CLINIC | Age: 57
End: 2022-10-04
Payer: COMMERCIAL

## 2022-10-04 VITALS
WEIGHT: 293 LBS | OXYGEN SATURATION: 99 % | TEMPERATURE: 97 F | BODY MASS INDEX: 44.41 KG/M2 | HEIGHT: 68 IN | SYSTOLIC BLOOD PRESSURE: 120 MMHG | DIASTOLIC BLOOD PRESSURE: 78 MMHG | HEART RATE: 69 BPM

## 2022-10-04 DIAGNOSIS — R60.0 BILATERAL LEG EDEMA: ICD-10-CM

## 2022-10-04 DIAGNOSIS — I44.7 LBBB (LEFT BUNDLE BRANCH BLOCK): Primary | ICD-10-CM

## 2022-10-04 DIAGNOSIS — E78.49 OTHER HYPERLIPIDEMIA: ICD-10-CM

## 2022-10-04 DIAGNOSIS — I50.1 HEART FAILURE, LEFT, WITH LVEF 41-49% (HCC): ICD-10-CM

## 2022-10-04 PROCEDURE — 99214 OFFICE O/P EST MOD 30 MIN: CPT | Performed by: INTERNAL MEDICINE

## 2022-10-04 PROCEDURE — 93000 ELECTROCARDIOGRAM COMPLETE: CPT | Performed by: INTERNAL MEDICINE

## 2022-10-04 RX ORDER — TORSEMIDE 10 MG/1
TABLET ORAL
Qty: 45 TABLET | Refills: 1 | Status: SHIPPED | OUTPATIENT
Start: 2022-10-04 | End: 2022-10-10 | Stop reason: SDUPTHER

## 2022-10-04 RX ORDER — CARVEDILOL 6.25 MG/1
6.25 TABLET ORAL 2 TIMES DAILY WITH MEALS
Qty: 180 TABLET | Refills: 1 | Status: SHIPPED | OUTPATIENT
Start: 2022-10-04 | End: 2022-10-10 | Stop reason: SDUPTHER

## 2022-10-04 RX ORDER — SPIRONOLACTONE 25 MG/1
25 TABLET ORAL DAILY
Qty: 90 TABLET | Refills: 1 | Status: SHIPPED | OUTPATIENT
Start: 2022-10-04 | End: 2022-10-10 | Stop reason: SDUPTHER

## 2022-10-04 NOTE — PATIENT INSTRUCTIONS
GLP-1-   Ozempic- diabetes/weight loss                  Wegovy- weight loss      Potassium Content of Foods List   AMBULATORY CARE:   Potassium  is a mineral that is found in most foods  Potassium helps to balance fluids and minerals in your body  It also helps your body maintain a normal blood pressure  Potassium helps your muscles contract and your nerves function normally  Why you may need to change the amount of potassium you eat: You may need more potassium  if you have hypokalemia (low potassium levels) or high blood pressure  You may also need more potassium if you are taking diuretics  Diuretics and certain medicines cause your body to lose potassium  You may need less potassium  in your diet if you have hyperkalemia (high potassium levels) or kidney disease  Potassium content of fruit:  The amount of potassium in milligrams (mg) contained in each fruit or serving of fruit is listed beside the item    High-potassium foods (more than 200 mg per serving):      1 medium banana (425)    ½ of a papaya (390)    ½ cup of prune juice (370)    ¼ cup of raisins (270)    1 medium van (325) or kiwi (240)    1 small orange (240) or ½ cup of orange juice (235)    ½ cup of cubed cantaloupe (215) or diced honeydew melon (200)    1 medium pear (200)    Medium-potassium foods (50 to 200 mg per serving):      1 medium peach (185)    1 small apple or ½ cup of apple juice (150)    ½ cup of peaches canned in juice (120)    ½ cup of canned pineapple (100)    ½ cup of fresh, sliced strawberries (328)    ½ cup of watermelon (85)    Low-potassium foods (less than 50 mg per serving):      ½ cup of cranberries (45) or cranberry juice cocktail (20)    ½ cup of nectar of papaya, van, or pear (35)    Potassium content of vegetables:   High-potassium foods (more than 200 mg per serving):      1 medium baked potato, with skin (925)    1 baked medium sweet potato, with skin (450)    ½ cup of tomato or vegetable juice (275), or 1 medium raw tomato (290)    ½ cup of mushrooms (280)    ½ cup of fresh brussels sprouts (250)    ½ cup of cooked zucchini (220) or winter squash (250)    ¼ of a medium avocado (245)    ½ cup of broccoli (230)    Medium-potassium foods (50 to 200 mg per serving):      ½ cup of corn (195)    ½ cup of fresh or cooked carrots (180)    ½ cup of fresh cauliflower (150)    ½ cup of asparagus (155)    ½ cup of canned peas (90)     1 cup of lettuce, all types (100)    ½ cup of fresh green beans (90)    ½ cup of frozen green beans (85)    ½ cup of cucumber (80)    Potassium content of protein foods:   High-potassium foods (more than 200 mg per serving):      ½ cup of cooked britt beans (400) or lentils (365)    1 cup of soy milk (300)    3 ounces of baked or broiled salmon (319)    3 ounces of roasted turkey, dark meat (250)    ¼ cup of sunflower seeds (241)    3 ounces of cooked lean beef (224)    2 tablespoons of smooth peanut butter (210)    Medium-potassium foods (50 to 200 mg per serving):      1 ounce of salted peanuts, almonds, or cashews (200)    1 large egg (60 mg)    Potassium content of dairy foods:   High-potassium foods (more than 200 mg per serving):      6 ounces of yogurt (260 to 435)    1 cup of nonfat, low-fat, or whole milk (350 to 380)    Medium-potassium foods (50 to 200 mg per serving):      ½ cup of ricotta cheese (154)    ½ cup of vanilla ice cream (131)    ½ cup of low-fat (2%) cottage cheese (110)    Low-potassium foods (less than 50 mg per serving):      1 ounce of cheese (20 to 30)      Potassium content of grains:   1 slice of white bread (30)    ½ cup of white or brown rice (50)    ½ cup of spaghetti or macaroni (30)    1 flour or corn tortilla (50)    1 four-inch waffle (50)    Potassium content of other foods:   1 tablespoon of molasses (295)    1½ ounces of chocolate (165)    Some salt substitutes may contain a high amount of potassium   Check the food label to find the amount of potassium it contains  © Copyright [employer] 2022 Information is for End User's use only and may not be sold, redistributed or otherwise used for commercial purposes  All illustrations and images included in CareNotes® are the copyrighted property of [employer]  or [employer]  The above information is an  only  It is not intended as medical advice for individual conditions or treatments  Talk to your doctor, nurse or pharmacist before following any medical regimen to see if it is safe and effective for you

## 2022-10-04 NOTE — PROGRESS NOTES
Cardiology Follow Up  Joseph Second  1965  [id number]  [address] [id number] [address] [address]    1  LBBB (left bundle branch block)  POCT ECG   2  Heart failure, left, with LVEF 41-49% ([facility])  POCT ECG   3  Other hyperlipidemia  POCT ECG     1  Chronic systolic heart failure NYHA class 2 nonischemic EF40%- compensated on exam  Continue low salt diet + exercise  Continue carvedilol 6 25mg ++ aldactone 25mg  We will try and switch her from losartan to [facility]  I discussed with her that [facility] has both mortality and morbidity benefit in patients with chronic heart dysfunction  She will watch her potassium intake  I discussed with her the importance need to periodically monitor her electrolytes when she is on diuretics and potassium-sparing medications  2  Dm2-  A1c-5 7    3  ARMANDO- weight loss  Will consider tonsillectomy    4  LBBB- old  Likely from previous dilated cm/hf  stable    5  Hld- atorvastatin    6  Weight loss/bmi46- no contraindication to GLP-1  Patient will talk with primary  Rtc- six months     Discussion/Plan:  Initail visit: 51 yo pleasant woman hx morbid obesity, nonischemic systolic heart failure [id number]%, LBBB, sleep apnea presents for follow-up  Lower extremity edema has signifcantly improved but remains  Initially improved but stopped torsemide for a couple of days  Has had diarrhea possibly secondary to metformin on lisinopril  Lisinopril held and reports continued diarrhea? Denies having chest pain  Shortness of breath improved  Just received CPAP and is about to start using it 6/23: She has not been compliant with her CPAP device secondary to severe claustrophobia  She is using her diuretic about once or twice a week  Her weight has been stable  She denies having any exertional chest heaviness  She denies having significant palpitations   She denies having any PND or orthopnea  She continues to have diarrhea with metformin usage but her sugars have been improved  She is not very active and does not have an exercise program due to lack of time  She is watching her salt intake  2/8/18: She has not had significant edema in her legs  Her weight has been stable  She is using medications without dizziness  She is using the torsemide once every 11-12 days  Slipped on ice  Denies having shortness of breath on exertion  Not using CPAP  Pending tonsillectomy in the summer      10/06/2020:  She is compliant with her medications  Her lower extremity edema has been stable  She reports having increased diuresis which is affecting her quality of life  She is starting [address]  We will cut back on her torsemide  She denies having significant chest pain  She denies feeling dizziness or lightheadedness  06/16/2021:  She denies having recurrence of lower extremity swelling  She has had periodically take torsemide  Her sugars are better controlled  She is sedentary  She has not been exercising  She denies having chest heaviness  She denies having major palpitations  10/04/2022:  She states lower extremity swelling has been stable  She has not had to use torsemide frequently  She has had some yeast infections after taking Jardiance  However they have been treated easily  Denies any major chest pain  She is compliant with her medications  We discussed about the new treatments medical for weight loss    There would be no contraindications cardiovascularly for a GLP- 1 agonist          Interval History:    Patient Active Problem List   Diagnosis    ARMANDO (obstructive sleep apnea)    Morbid obesity (HCC)    Bilateral leg edema    LBBB (left bundle branch block)    Screening for cardiovascular, respiratory, and genitourinary diseases    Hypertensive heart and kidney disease with HF and with CKD stage I-IV ([facility] )    Chronic eczema    CKD stage 1 due to type 2 diabetes mellitus ([facility] )    Degenerative arthritis of thoracic spine    Generalized anxiety disorder    Heart failure, left, with LVEF 41-49% ([facility]    Low back pain    Type 2 diabetes mellitus with stage 1 chronic kidney disease, without long-term current use of insulin (HCC)    Allergic rhinitis due to allergen    Gynecologic exam normal    Colon cancer screening    Other hyperlipidemia    Rash    Breast cancer screening    Immunization due    Routine gynecological examination    Healthcare maintenance    History of 2019 novel coronavirus disease (COVID-19)    Word finding difficulty    Memory difficulties    Myalgia    Strain of long head of left biceps    Bilateral thigh pain    Hyperlipidemia    Vaginal yeast infection    Adhesive capsulitis of left shoulder    Rotator cuff tendinitis, left    Biceps tendinitis on left    BMI 45 0-49 9, adult ([facility] )     Past Medical History:   Diagnosis Date    Achilles tendinitis, unspecified leg 12/14/2006    Anxiety     Arthritis     Last Assessed: 9/28/2017    Asthma     Last Assessed: 9/28/2017    Bilateral leg edema     BMI 50 0-59 9, adult ([facility] )     Breast lump 07/11/2008    Chest pain on breathing     Last Assessed: 3/9/2014    Chronic kidney disease, stage 1     Last Assessed: 3/1/2014    Clotting disorder ([facility] )     Diabetes mellitus ([facility] )     Last Assessed: 9/28/2017 Type 2 with renal manifestations, controlled    Eczema     Environmental allergies     Last Assessed: 9/28/2017    Exposure to potentially hazardous body fluids     Last Assessed: 4/10/2015    Heart failure, left, with LVEF 41-49% [facility]     Hypertension 05/30/2012    Benign Essential    Infectious mononucleosis     Resolved: 8/19/2015    Lateral epicondylitis 02/17/2011    unspecified laterality    LBBB (left bundle branch block)     LBBB (left bundle branch block) 2/8/2018    Lipoma     Last Assessesd: 10/13/2014    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    Psychiatric disorder     Sialodochitis 06/01/2010    Situational anxiety     Last Assessed: 10/22/2015    Sleep apnea     Tongue disorder     Last Assessed: 6/9/2016    Type 2 diabetes mellitus with kidney complication, without long-term current use of insulin ([facility] )      Social History     Socioeconomic History    Marital status: Legally      Spouse name: Not on file    Number of children: 2    Years of education: Not on file    Highest education level: Not on file   Occupational History    Not on file   Tobacco Use    Smoking status: Never Smoker    Smokeless tobacco: Never Used    Tobacco comment: N/a   Vaping Use    Vaping Use: Never used   Substance and Sexual Activity    Alcohol use: Never    Drug use: Never    Sexual activity: Yes     Partners: Male     Comment: pt  requests std testing   Other Topics Concern    Not on file   Social History Narrative    Employed    Lack of adequate sleep    Lack of exercise    Pets: dog     Social Determinants of Health     Financial Resource Strain: Not on file   Food Insecurity: Not on file   Transportation Needs: Not on file   Physical Activity: Not on file   Stress: Not on file   Social Connections: Not on file   Intimate Partner Violence: Not on file   Housing Stability: Not on file      Family History   Problem Relation Age of Onset    Heart attack Mother     Diabetes type II Mother     Heart disease Father     Atrial fibrillation Father     Arthritis Father     Arthritis Sister     Other Other         Cardiac Disorder    Arthritis Family     Breast cancer Maternal Grandmother 61    Heart disease Maternal Grandmother     Bone cancer Maternal Grandfather     Brain cancer Paternal Grandfather     No Known Problems Maternal Aunt      Past Surgical History:   Procedure Laterality Date    CERVICAL BIOPSY  W/ LOOP ELECTRODE EXCISION  1992    CHOLECYSTECTOMY      COLONOSCOPY  1997    ESSURE TUBAL LIGATION  2010   [place] [address] GYNECOLOGIC CRYOSURGERY  1992       Current Outpatient Medications:     albuterol (PROVENTIL HFA,VENTOLIN HFA) 90 mcg/act inhaler, Inhale 1-2 puffs, Disp: , Rfl:     atorvastatin (LIPITOR) 10 mg tablet, TAKE 1 TABLET DAILY, Disp: 90 tablet, Rfl: 1    carvedilol (COREG) 6 25 mg tablet, Take 1 tablet (6 25 mg total) by mouth 2 (two) times a day with meals, Disp: 180 tablet, Rfl: 0    Empagliflozin (Jardiance) 10 MG TABS, Take 1 tablet (10 mg total) by mouth every morning, Disp: 90 tablet, Rfl: 1    sacubitril-valsartan (Entresto) 24-26 MG TABS, AT THE START OF THERAPY TAKE 1 TABLET DAILY FOR 1 WEEK THEN INCREASE TO 1 TABLET TWICE A DAY THEREAFTER, Disp: 180 tablet, Rfl: 3    sitaGLIPtin (Januvia) 100 mg tablet, Take 1 tablet (100 mg total) by mouth in the morning , Disp: 90 tablet, Rfl: 1    spironolactone (ALDACTONE) 25 mg tablet, Take 1 tablet (25 mg total) by mouth daily, Disp: 90 tablet, Rfl: 0    torsemide (DEMADEX) 10 mg tablet, Once a day on Mon-Friday- Sunday morning, Disp: 45 tablet, Rfl: 1  Allergies   Allergen Reactions    Vioxx [Rofecoxib] Swelling       Review of Systems:  Review of Systems   Constitutional: Negative  Negative for activity change, appetite change, chills, diaphoresis, fatigue, fever and unexpected weight change  HENT: Negative  Negative for congestion, dental problem, drooling, ear discharge, ear pain, facial swelling, hearing loss, mouth sores, nosebleeds, postnasal drip, rhinorrhea, sinus pain, sinus pressure, sneezing, sore throat, tinnitus, trouble swallowing and voice change  Eyes: Negative  Negative for photophobia, pain, redness, itching and visual disturbance  Respiratory: Negative  Negative for apnea, cough, choking, chest tightness, shortness of breath, wheezing and stridor  Cardiovascular: Negative  Negative for chest pain, palpitations and leg swelling  Gastrointestinal: Negative    Negative for abdominal distention, abdominal pain, anal bleeding, blood in stool, constipation, diarrhea, nausea, rectal pain and vomiting  Endocrine: Negative  Negative for cold intolerance, heat intolerance, polydipsia, polyphagia and polyuria  Genitourinary: Negative  Negative for decreased urine volume, difficulty urinating, dyspareunia, dysuria, enuresis, flank pain, frequency, genital sores, hematuria, menstrual problem, pelvic pain, urgency, vaginal bleeding, vaginal discharge and vaginal pain  Musculoskeletal: Negative  Negative for arthralgias, back pain, gait problem, joint swelling, myalgias, neck pain and neck stiffness  Skin: Negative  Negative for color change, pallor, rash and wound  Allergic/Immunologic: Negative  Negative for environmental allergies, food allergies and immunocompromised state  Neurological: Negative  Negative for dizziness, tremors, seizures, syncope, facial asymmetry, speech difficulty, weakness, light-headedness, numbness and headaches  Hematological: Negative  Negative for adenopathy  Does not bruise/bleed easily  Psychiatric/Behavioral: Positive for sleep disturbance  Negative for agitation, behavioral problems, confusion, decreased concentration, dysphoric mood, hallucinations, self-injury and suicidal ideas  The patient is not nervous/anxious and is not hyperactive  All other systems reviewed and are negative  Vitals:    10/04/22 1638   BP: 120/78   BP Location: Right arm   Patient Position: Sitting   Cuff Size: Large   Pulse: 69   Temp: (!) 97 °F (36 1 °C)   SpO2: 99%   Weight: (!) 137 kg (302 lb)   Height: 5' 8" (1 727 m)     Physical Exam:  Physical Exam   Constitutional: She is oriented to person, place, and time  No distress  Obesity, pleasant   HENT:   Head: Normocephalic and atraumatic  Right Ear: External ear normal    Left Ear: External ear normal    Eyes: Conjunctivae are normal  Pupils are equal, round, and reactive to light  Right eye exhibits no discharge  Left eye exhibits no discharge   No scleral icterus  Neck: Normal range of motion  Neck supple  No JVD present  No tracheal deviation present  No thyromegaly present  Cardiovascular: Normal rate and regular rhythm  Exam reveals gallop  Exam reveals no friction rub  No murmur heard  Pulmonary/Chest: Effort normal and breath sounds normal  No stridor  No respiratory distress  She has no wheezes  She has no rales  She exhibits no tenderness  Abdominal: Soft  Bowel sounds are normal  She exhibits no distension and no mass  There is no tenderness  There is no rebound and no guarding  Musculoskeletal: Normal range of motion  She exhibits no edema, tenderness or deformity  Neurological: She is alert and oriented to person, place, and time  She has normal reflexes  No cranial nerve deficit  She exhibits normal muscle tone  Coordination normal    Skin: Skin is warm and dry  No rash noted  She is not diaphoretic  No erythema  No pallor  Psychiatric: She has a normal mood and affect  Her behavior is normal  Judgment and thought content normal        Labs:   CBC with diff:      Invalid input(s):  RBC, TOTALCELLSCOUNTED, SEGS%, GRANS%, LYMPHS%, EOS%, BASO%, ABNEUT, ABGRANS, ABLYMPHS, ABMOMOS, ABEOS, ABBASO    CMP:      Invalid input(s): ALBUMIN    Magnesium:    Coags:    TSH:    Lipid Profile:    Hgb A1c:    NT-proBNP: No results for input(s): NTBNP in the last 72 hours  Imaging & Testing   I have personally reviewed pertinent reports  EKG: Personally reviewed      Normal sinus rhythm LBBB    Cardiac testing:   Results for orders placed during the hospital encounter of 17   Echo complete with contrast if indicated    Narrative [address]  [address] [address]  [phone]    Transthoracic Echocardiogram  2D, M-mode, Doppler, and Color Doppler    Study date:  2017    Patient: Tristin Beavers  MR number: [id number]  Account number: [de-identified]  : [id number]  Age: 54 years  Gender: Female  Status: Routine  Location: Echo lab  Height: 69 in  Weight: 348  3 lb  BP: 128/ 74 mmHg    Indications: Heart Failure    Diagnoses: 428 9 - HEART FAILURE NOS    Sonographer:  Emre Danielle  Primary Physician:  Saturnino Ren DO  Referring Physician:  Joy Myles MD  Group:  Jamee Young  RN:  JEANNETTE Zapata  Interpreting Physician:  Bill Hitchcock MD    SUMMARY    LEFT VENTRICLE:  The ventricle was mildly dilated  Systolic function was mildly to moderately reduced  Ejection fraction was  estimated in the range of 35 % to 45 % to be 40 %  difficult to assess accurate  EF due to limited study and paradoxical septal motion, consider MUGA or Cardiac  MRI as confirmation  There was mild diffuse hypokinesis with paradoxical septal motion  Wall thickness was mildly increased  There was mild concentric hypertrophy  RIGHT VENTRICLE:  The tricuspid jet envelope definition was inadequate for estimation of RV  systolic pressure  There are no indirect findings (abnormal RV volume or  geometry, altered pulmonary flow velocity profile, or leftward septal  displacement) which would suggest moderate or severe pulmonary hypertension  LEFT ATRIUM:  The atrium was mildly dilated  MITRAL VALVE:  There was trace regurgitation  HISTORY: PRIOR HISTORY: HTN, DM, Anxiety, Asthma, Arthritis    PROCEDURE: The procedure was performed in the echo lab  This was a routine  study  The transthoracic approach was used  The study included complete 2D  imaging, M-mode, complete spectral Doppler, and color Doppler  The heart rate  was 90 bpm, at the start of the study  Intravenous contrast (Definity solution  [1 3 ml Definity/8 7ml normal saline solution], 2 ml) was administered to  opacify the left ventricle  Intravenous contrast ( 1 ml) was administered  Echocardiographic views were limited due to poor acoustic window availability,  decreased penetration, and lung interference   This was a technically difficult  study  LEFT VENTRICLE: The ventricle was mildly dilated  Systolic function was mildly  to moderately reduced  Ejection fraction was estimated in the range of 35 % to  45 % to be 40 %  difficult to assess accurate EF due to limited study and  paradoxical septal motion, consider MUGA or Cardiac MRI as confirmation There  was mild diffuse hypokinesis with paradoxical septal motion  Wall thickness was  mildly increased  There was mild concentric hypertrophy  DOPPLER: Left  ventricular diastolic function parameters were normal for the patient's age  RIGHT VENTRICLE: The size was normal  Systolic function was normal  DOPPLER:  The tricuspid jet envelope definition was inadequate for estimation of RV  systolic pressure  There are no indirect findings (abnormal RV volume or  geometry, altered pulmonary flow velocity profile, or leftward septal  displacement) which would suggest moderate or severe pulmonary hypertension  LEFT ATRIUM: The atrium was mildly dilated  RIGHT ATRIUM: Size was normal     MITRAL VALVE: Valve structure was normal  There was normal leaflet separation  DOPPLER: The transmitral velocity was within the normal range  There was no  evidence for stenosis  There was trace regurgitation  AORTIC VALVE: The valve was trileaflet  Leaflets exhibited normal thickness and  normal cuspal separation  DOPPLER: Transaortic velocity was within the normal  range  There was no evidence for stenosis  There was no regurgitation  TRICUSPID VALVE: DOPPLER: There was no significant regurgitation  PERICARDIUM: There was no thickening or calcification  There was no pericardial  effusion  AORTA: The root exhibited normal size  SYSTEMIC VEINS: IVC: The inferior vena cava was not well visualized      SYSTEM MEASUREMENT TABLES    2D mode  AoR Diam 2D: 3 3 cm  LA Diam (2D): 4 9 cm  LA/Ao (2D): 1 48  FS (2D Teich): 15 2 %  IVSd (2D): 1 18 cm  LVDEV: 170 cm³  LVESV: 116 cm³  LVIDd(2D): 5 85 cm  LVISd (2D): 4 96 cm  LVPWd (2D): 1 18 cm  SV (Teich): 54 cm³    Apical four chamber  LVEF A4C: 33 %    Apical two chamber  LA Area: 26 8 cm squared  LA Volume: 91 cm³    Unspecified Scan Mode  MV Peak A Dejan: 563 mm/s  MV Peak E Dejan  Mean: 1110 mm/s  MVA (PHT): 4 89 cm squared  PHT: 45 ms  Max P mm[Hg]  V Max: 2590 mm/s  Vmax: 2610 mm/s  RA Area: 13 7 cm squared  RA Volume: 35 4 cm³  TAPSE: 2 cm    [facility]    Prepared and electronically signed by    Hugh De Los Santos MD  Signed 2017 11:04:22       10/6/20: normal sinus rhythm LBBB qrs [address]UNA [address]  Please call with any questions or suggestions    A description of the counseling:   Goals and Barriers:  Patient's ability to self care:  Medication side effect reviewed with patient in detail and all their questions answered  "This note has been constructed using a voice recognition system  Therefore there may be syntax, spelling, and/or grammatical errors   Please call if you have any questions  "

## 2022-10-08 LAB
ALBUMIN SERPL-MCNC: 4.5 G/DL (ref 3.8–4.9)
ALBUMIN/GLOB SERPL: 1.6 {RATIO} (ref 1.2–2.2)
ALP SERPL-CCNC: 82 IU/L (ref 44–121)
ALT SERPL-CCNC: 24 IU/L (ref 0–32)
AST SERPL-CCNC: 21 IU/L (ref 0–40)
BILIRUB SERPL-MCNC: 0.8 MG/DL (ref 0–1.2)
BUN SERPL-MCNC: 14 MG/DL (ref 6–24)
BUN/CREAT SERPL: 18 (ref 9–23)
CALCIUM SERPL-MCNC: 9.3 MG/DL (ref 8.7–10.2)
CHLORIDE SERPL-SCNC: 102 MMOL/L (ref 96–106)
CO2 SERPL-SCNC: 26 MMOL/L (ref 20–29)
CREAT SERPL-MCNC: 0.77 MG/DL (ref 0.57–1)
EGFR: 92 ML/MIN/1.73
FSH SERPL-ACNC: 19.2 MIU/ML
GLOBULIN SER-MCNC: 2.9 G/DL (ref 1.5–4.5)
GLUCOSE SERPL-MCNC: 131 MG/DL (ref 70–99)
HBA1C MFR BLD: 6 % (ref 4.8–5.6)
LH SERPL-ACNC: 17.6 MIU/ML
POTASSIUM SERPL-SCNC: 4.8 MMOL/L (ref 3.5–5.2)
PROT SERPL-MCNC: 7.4 G/DL (ref 6–8.5)
SODIUM SERPL-SCNC: 141 MMOL/L (ref 134–144)

## 2022-10-10 DIAGNOSIS — R60.0 BILATERAL LEG EDEMA: ICD-10-CM

## 2022-10-10 DIAGNOSIS — E78.49 OTHER HYPERLIPIDEMIA: ICD-10-CM

## 2022-10-10 DIAGNOSIS — I44.7 LBBB (LEFT BUNDLE BRANCH BLOCK): ICD-10-CM

## 2022-10-10 RX ORDER — SPIRONOLACTONE 25 MG/1
25 TABLET ORAL DAILY
Qty: 90 TABLET | Refills: 1 | Status: SHIPPED | OUTPATIENT
Start: 2022-10-10

## 2022-10-10 RX ORDER — TORSEMIDE 10 MG/1
TABLET ORAL
Qty: 45 TABLET | Refills: 1 | Status: SHIPPED | OUTPATIENT
Start: 2022-10-10

## 2022-10-10 RX ORDER — CARVEDILOL 6.25 MG/1
6.25 TABLET ORAL 2 TIMES DAILY WITH MEALS
Qty: 180 TABLET | Refills: 1 | Status: SHIPPED | OUTPATIENT
Start: 2022-10-10

## 2022-10-10 NOTE — TELEPHONE ENCOUNTER
----- Message from Elijah Vasquez sent at 10/10/2022  2:01 PM EDT -----  Regarding: Medications  Good afternoon,    My medications were called into [facility] in [facility]  I actually need [facility] use [facility] as per my medical plan  Can you please call them in to [facility]  THANK YOU!

## 2022-10-17 ENCOUNTER — TELEPHONE (OUTPATIENT)
Dept: OBGYN CLINIC | Facility: CLINIC | Age: 57
End: 2022-10-17

## 2022-10-17 NOTE — TELEPHONE ENCOUNTER
Reviewed with patient, will try coconut oil first  Only having itching and irritation around the fissures  Pt to call back if doesn't improve  Doesn't like monistat because it makes her burn

## 2022-10-17 NOTE — TELEPHONE ENCOUNTER
Pt lmom - having sx of yeast infection with fissures  Did get new medication for diabetes and not sure if that is causing  Not sure if Rx can be called in or needs apt

## 2022-10-18 ENCOUNTER — OFFICE VISIT (OUTPATIENT)
Dept: FAMILY MEDICINE CLINIC | Facility: CLINIC | Age: 57
End: 2022-10-18
Payer: COMMERCIAL

## 2022-10-18 VITALS
HEIGHT: 68 IN | BODY MASS INDEX: 44.41 KG/M2 | WEIGHT: 293 LBS | OXYGEN SATURATION: 95 % | HEART RATE: 87 BPM | RESPIRATION RATE: 16 BRPM | DIASTOLIC BLOOD PRESSURE: 74 MMHG | SYSTOLIC BLOOD PRESSURE: 116 MMHG | TEMPERATURE: 97.3 F

## 2022-10-18 DIAGNOSIS — Z12.31 BREAST CANCER SCREENING BY MAMMOGRAM: ICD-10-CM

## 2022-10-18 DIAGNOSIS — N18.1 TYPE 2 DIABETES MELLITUS WITH STAGE 1 CHRONIC KIDNEY DISEASE, WITHOUT LONG-TERM CURRENT USE OF INSULIN (HCC): Primary | ICD-10-CM

## 2022-10-18 DIAGNOSIS — E78.5 HYPERLIPIDEMIA, UNSPECIFIED HYPERLIPIDEMIA TYPE: ICD-10-CM

## 2022-10-18 DIAGNOSIS — N18.1 CKD STAGE 1 DUE TO TYPE 2 DIABETES MELLITUS (HCC): ICD-10-CM

## 2022-10-18 DIAGNOSIS — Z12.11 COLON CANCER SCREENING: ICD-10-CM

## 2022-10-18 DIAGNOSIS — E11.22 TYPE 2 DIABETES MELLITUS WITH STAGE 1 CHRONIC KIDNEY DISEASE, WITHOUT LONG-TERM CURRENT USE OF INSULIN (HCC): Primary | ICD-10-CM

## 2022-10-18 DIAGNOSIS — I50.1 HEART FAILURE, LEFT, WITH LVEF 41-49% (HCC): ICD-10-CM

## 2022-10-18 DIAGNOSIS — E11.22 CKD STAGE 1 DUE TO TYPE 2 DIABETES MELLITUS (HCC): ICD-10-CM

## 2022-10-18 PROCEDURE — 99214 OFFICE O/P EST MOD 30 MIN: CPT | Performed by: FAMILY MEDICINE

## 2022-10-18 RX ORDER — SEMAGLUTIDE 1.34 MG/ML
0.25 INJECTION, SOLUTION SUBCUTANEOUS
Qty: 4.5 ML | Refills: 1 | Status: SHIPPED | OUTPATIENT
Start: 2022-10-18

## 2022-10-18 NOTE — PROGRESS NOTES
Assessment/Plan:    No problem-specific Assessment & Plan notes found for this encounter  DM2  Pt wants to change to GLP1a   Hopes to secondarily benefit from wt loss and cardiac benefit  Dc januvia  Start ozempic  Titration advised  Thyroid ca risk advised    htn with chf, continue meds    HLD stable, continue meds    F/u in 3m    Start ozempic at 0 25mg weekly for the first 4 weeks then we will increase to 0 5mg weekly thereafter  Januvia can be stopped when this is started  Diagnoses and all orders for this visit:    Type 2 diabetes mellitus with stage 1 chronic kidney disease, without long-term current use of insulin (HCC)  -     Semaglutide,0 25 or 0 5MG/DOS, (Ozempic, 0 25 or 0 5 MG/DOSE,) 2 MG/1 5ML SOPN; Inject 0 25 mg under the skin every 7 days  -     Comprehensive metabolic panel; Future  -     Hemoglobin A1C; Future    Colon cancer screening    Breast cancer screening by mammogram  -     Mammo screening bilateral w 3d & cad; Future    CKD stage 1 due to type 2 diabetes mellitus ([facility] )    Hyperlipidemia, unspecified hyperlipidemia type    Heart failure, left, with LVEF 41-49% ([facility] )      Return in about 3 months (around 1/18/2023) for Recheck  Subjective:      Patient ID: Ben Owen is a 49 y o  female  Chief Complaint   Patient presents with   • Follow-up     KEMAL Wasserman         HPI  Wants to be on ozempic  Off metformin  Does exercise and watches diet    The following portions of the patient's history were reviewed and updated as appropriate: allergies, current medications, past family history, past medical history, past social history, past surgical history and problem list     Review of Systems   Constitutional: Negative for fever  Respiratory: Negative for shortness of breath            Current Outpatient Medications   Medication Sig Dispense Refill   • atorvastatin (LIPITOR) 10 mg tablet TAKE 1 TABLET DAILY 90 tablet 1   • carvedilol (COREG) 6 25 mg tablet Take 1 tablet (6 25 mg total) by mouth 2 (two) times a day with meals 180 tablet 1   • Empagliflozin (Jardiance) 10 MG TABS Take 1 tablet (10 mg total) by mouth every morning 90 tablet 1   • sacubitril-valsartan (Entresto) 24-26 MG TABS AT THE START OF THERAPY TAKE 1 TABLET DAILY FOR 1 WEEK THEN INCREASE TO 1 TABLET TWICE A DAY THEREAFTER 180 tablet 3   • Semaglutide,0 25 or 0 5MG/DOS, (Ozempic, 0 25 or 0 5 MG/DOSE,) 2 MG/1 5ML SOPN Inject 0 25 mg under the skin every 7 days 4 5 mL 1   • spironolactone (ALDACTONE) 25 mg tablet Take 1 tablet (25 mg total) by mouth daily 90 tablet 1   • torsemide (DEMADEX) 10 mg tablet Once a day on Mon-Friday- Sunday as needed 45 tablet 1     No current facility-administered medications for this visit  Objective:    /74   Pulse 87   Temp (!) 97 3 °F (36 3 °C)   Resp 16   Ht 5' 8" (1 727 m)   Wt 136 kg (300 lb)   SpO2 95%   BMI 45 61 kg/m²        Physical Exam  Vitals and nursing note reviewed  Constitutional:       General: She is not in acute distress  Appearance: She is well-developed  She is obese  She is not ill-appearing  HENT:      Head: Normocephalic  Right Ear: External ear normal       Left Ear: External ear normal    Eyes:      General: No scleral icterus  Conjunctiva/sclera: Conjunctivae normal    Cardiovascular:      Rate and Rhythm: Normal rate and regular rhythm  Pulmonary:      Effort: Pulmonary effort is normal  No respiratory distress  Breath sounds: No wheezing  Abdominal:      Palpations: Abdomen is soft  Musculoskeletal:         General: No deformity  Cervical back: Neck supple  Right lower leg: No edema  Left lower leg: No edema  Skin:     General: Skin is warm and dry  Coloration: Skin is not pale  Neurological:      Mental Status: She is alert  Psychiatric:         Mood and Affect: Mood normal          Behavior: Behavior normal          Thought Content:  Thought content normal                 Nigel Medina [employer]

## 2022-10-18 NOTE — PATIENT INSTRUCTIONS
Start ozempic at 0 25mg weekly for the first 4 weeks then we will increase to 0 5mg weekly thereafter  Januvia can be stopped when this is started

## 2022-10-24 DIAGNOSIS — E78.49 OTHER HYPERLIPIDEMIA: ICD-10-CM

## 2022-10-24 RX ORDER — ATORVASTATIN CALCIUM 10 MG/1
TABLET, FILM COATED ORAL
Qty: 90 TABLET | Refills: 1 | Status: SHIPPED | OUTPATIENT
Start: 2022-10-24

## 2022-10-26 ENCOUNTER — OFFICE VISIT (OUTPATIENT)
Dept: FAMILY MEDICINE CLINIC | Facility: CLINIC | Age: 57
End: 2022-10-26
Payer: COMMERCIAL

## 2022-10-26 VITALS
DIASTOLIC BLOOD PRESSURE: 64 MMHG | HEART RATE: 77 BPM | RESPIRATION RATE: 18 BRPM | WEIGHT: 293 LBS | OXYGEN SATURATION: 96 % | TEMPERATURE: 98.1 F | SYSTOLIC BLOOD PRESSURE: 112 MMHG | BODY MASS INDEX: 46.07 KG/M2

## 2022-10-26 DIAGNOSIS — R60.0 BILATERAL LEG EDEMA: ICD-10-CM

## 2022-10-26 DIAGNOSIS — E11.22 TYPE 2 DIABETES MELLITUS WITH STAGE 1 CHRONIC KIDNEY DISEASE, WITHOUT LONG-TERM CURRENT USE OF INSULIN (HCC): ICD-10-CM

## 2022-10-26 DIAGNOSIS — J01.90 ACUTE SINUSITIS, RECURRENCE NOT SPECIFIED, UNSPECIFIED LOCATION: Primary | ICD-10-CM

## 2022-10-26 DIAGNOSIS — I44.7 LBBB (LEFT BUNDLE BRANCH BLOCK): ICD-10-CM

## 2022-10-26 DIAGNOSIS — N18.1 TYPE 2 DIABETES MELLITUS WITH STAGE 1 CHRONIC KIDNEY DISEASE, WITHOUT LONG-TERM CURRENT USE OF INSULIN (HCC): ICD-10-CM

## 2022-10-26 PROCEDURE — 99214 OFFICE O/P EST MOD 30 MIN: CPT | Performed by: NURSE PRACTITIONER

## 2022-10-26 RX ORDER — AMOXICILLIN AND CLAVULANATE POTASSIUM 875; 125 MG/1; MG/1
1 TABLET, FILM COATED ORAL EVERY 12 HOURS SCHEDULED
Qty: 20 TABLET | Refills: 0 | Status: SHIPPED | OUTPATIENT
Start: 2022-10-26 | End: 2022-11-05

## 2022-10-26 NOTE — PROGRESS NOTES
Assessment/Plan:    1  Acute sinusitis, recurrence not specified, unspecified location  -     amoxicillin-clavulanate (Augmentin) 875-125 mg per tablet; Take 1 tablet by mouth every 12 (twelve) hours for 10 days    2  LBBB (left bundle branch block)  Assessment & Plan:  Managed by cardiologist      3  Bilateral leg edema  Assessment & Plan:  Stable with current regimen      4  Type 2 diabetes mellitus with stage 1 chronic kidney disease, without long-term current use of insulin ([facility] [address] )  Assessment & Plan:  Advised to do not start ozempic until finishes antibiotic and will continue her current regimen  Lab Results   Component Value Date    HGBA1C 6 0 (H) 10/07/2022                 Patient Instructions: Take medication with food  It is important that you take the entire course of antibiotics prescribed  May also take a probiotic of your choice to maintain healthy GI fidelia  Can take some probiotic and yogurt with the medication  Supportive care discussed and advised  Advised to RTO for any worsening and no improvement  Follow up for no improvement and worsening of conditions  Patient advised and educated when to see immediate medical care  Return if symptoms worsen or fail to improve  Future Appointments   Date Time Provider Mariann Dean   12/20/2022  5:30 PM Sebastian Brown   1/24/2023  5:00 PM DO KEIRA Peters [facility]           Subjective:      Patient ID: Rick Knutson is a 49 y o  female      Chief Complaint   Patient presents with   • Cough     Neg COVID last night, SX Saturday         Sas/cma         Vitals:  /64   Pulse 77   Temp 98 1 °F (36 7 °C)   Resp 18   Wt (!) 137 kg (303 lb)   SpO2 96%   BMI 46 07 kg/m²   Wt Readings from Last 3 Encounters:   10/26/22 (!) 137 kg (303 lb)   10/18/22 136 kg (300 lb)   10/04/22 (!) 137 kg (302 lb)      HPI  Patient started with symptoms on 10/22/2022 with sneezing, congestion and progressed to cough and stated that having productive greenish phlegm  Denies fever, chills and sob  Tested negative for covid-19 last night  Complaint with medications for chronic illnesses and tolerating it well  Has not started ozempic yet          The following portions of the patient's history were reviewed and updated as appropriate: allergies, current medications, past family history, past medical history, past social history, past surgical history and problem list       Review of Systems   Constitutional: Negative for chills, diaphoresis, fatigue, fever and unexpected weight change  HENT: Positive for congestion and sneezing  Negative for dental problem, drooling, ear discharge, ear pain, facial swelling, hearing loss, mouth sores, nosebleeds, postnasal drip, rhinorrhea, sinus pressure, sinus pain, sore throat, tinnitus, trouble swallowing and voice change  Respiratory: Positive for cough  Negative for chest tightness, shortness of breath and wheezing  Cardiovascular: Negative  Gastrointestinal: Negative for abdominal pain, constipation, diarrhea, nausea and vomiting  Musculoskeletal: Negative  Skin: Negative  Neurological: Negative for dizziness, light-headedness and headaches  Objective:    Social History     Tobacco Use   Smoking Status Never Smoker   Smokeless Tobacco Never Used   Tobacco Comment    N/a       Allergies:    Allergies   Allergen Reactions   • Vioxx [Rofecoxib] Swelling         Current Outpatient Medications   Medication Sig Dispense Refill   • amoxicillin-clavulanate (Augmentin) 875-125 mg per tablet Take 1 tablet by mouth every 12 (twelve) hours for 10 days 20 tablet 0   • atorvastatin (LIPITOR) 10 mg tablet TAKE 1 TABLET DAILY 90 tablet 1   • carvedilol (COREG) 6 25 mg tablet Take 1 tablet (6 25 mg total) by mouth 2 (two) times a day with meals 180 tablet 1   • Empagliflozin (Jardiance) 10 MG TABS Take 1 tablet (10 mg total) by mouth every morning 90 tablet 1   • sacubitril-valsartan (Entresto) 24-26 MG TABS AT THE START OF THERAPY TAKE 1 TABLET DAILY FOR 1 WEEK THEN INCREASE TO 1 TABLET TWICE A DAY THEREAFTER 180 tablet 3   • spironolactone (ALDACTONE) 25 mg tablet Take 1 tablet (25 mg total) by mouth daily 90 tablet 1   • torsemide (DEMADEX) 10 mg tablet Once a day on Mon-Friday- Sunday as needed 45 tablet 1   • Semaglutide,0 25 or 0 5MG/DOS, (Ozempic, 0 25 or 0 5 MG/DOSE,) 2 MG/1 5ML SOPN Inject 0 25 mg under the skin every 7 days (Patient not taking: Reported on 10/26/2022) 4 5 mL 1     No current facility-administered medications for this visit  Physical Exam  Vitals reviewed  Constitutional:       Appearance: She is well-developed  She is obese  HENT:      Head: Normocephalic  Right Ear: Tympanic membrane, ear canal and external ear normal       Left Ear: Tympanic membrane, ear canal and external ear normal       Nose: Mucosal edema present  Right Sinus: Maxillary sinus tenderness present  No frontal sinus tenderness  Left Sinus: Maxillary sinus tenderness present  No frontal sinus tenderness  Mouth/Throat:      Mouth: No oral lesions  Pharynx: No oropharyngeal exudate or posterior oropharyngeal erythema  Cardiovascular:      Rate and Rhythm: Normal rate and regular rhythm  Heart sounds: Normal heart sounds  Pulmonary:      Effort: Pulmonary effort is normal       Breath sounds: Normal breath sounds  Musculoskeletal:         General: Normal range of motion  Cervical back: Neck supple  Lymphadenopathy:      Cervical:      Right cervical: No superficial or posterior cervical adenopathy  Left cervical: No superficial or posterior cervical adenopathy  Skin:     General: Skin is warm and dry  Neurological:      Mental Status: She is alert and oriented to person, place, and time  Psychiatric:         Behavior: Behavior normal          Thought Content:  Thought content normal          Judgment: Judgment normal  Naoma Messenger

## 2022-10-26 NOTE — LETTER
October 26, 2022     Patient: Elle Roberts  YOB: 1965  Date of Visit: 10/26/2022      To Whom it May Concern:    Elle Roberts is under my professional care  Analilia was seen in my office on 10/26/2022  If you have any questions or concerns, please don't hesitate to call           Sincerely,          ZOE Menendez        CC: No Recipients [phone]

## 2022-10-26 NOTE — PATIENT INSTRUCTIONS
Amoxicillin/Clavulanate Potassium (By mouth)   Amoxicillin (c-pkc-v-SUZANNA-in), Clavulanate Potassium ([id number]-vg-gy-brooks fti-TYC-if-um)  Treats infections  This medicine is a penicillin antibiotic  Brand Name(s): Augmentin, Augmentin ES-600, Augmentin XR   There may be other brand names for this medicine  When This Medicine Should Not Be Used: This medicine is not right for everyone  Do not use it if you had an allergic reaction to amoxicillin, clavulanate, or a similar antibiotic (penicillin or cephalosporin), or if you had liver problems caused by Augmentin®  How to Use This Medicine:   Liquid, Tablet, Chewable Tablet, Long Acting Tablet  Your doctor will tell you how much medicine to use  Do not use more than directed  Take this medicine with a snack or at the beginning of a meal to help prevent nausea  Chewable tablets: Chew the tablet completely before you swallow it  Measure the oral liquid medicine with a marked measuring spoon, oral syringe, or medicine cup  Shake the medicine well just before you measure each dose  Rinse the spoon or dropper after each use  Swallow the extended-release tablet whole  Do not crush, break, or chew it  Take all of the medicine in your prescription to clear up your infection, even if you feel better after the first few doses  Missed dose: Take a dose as soon as you remember  If it is almost time for your next dose, wait until then and take a regular dose  Do not take extra medicine to make up for a missed dose  Tablet, extended-release tablet, chewable tablet: Store at room temperature, away from heat, moisture, and direct light  Oral liquid: Store in the refrigerator  Do not freeze  Throw away any unused oral liquid after 10 days  Drugs and Foods to Avoid:   Ask your doctor or pharmacist before using any other medicine, including over-the-counter medicines, vitamins, and herbal products  Some medicines can affect how this medicine works   Tell your doctor if you are taking a blood thinner (such as warfarin), allopurinol, or probenecid  Warnings While Using This Medicine:   Tell your doctor if you are pregnant or breastfeeding, or if you have kidney disease, liver disease, or mononucleosis (mono)  Birth control pills may not work as well while you are taking this medicine  Use another form of birth control to prevent pregnancy  This medicine can cause diarrhea  Call your doctor if the diarrhea becomes severe, does not stop, or is bloody  Do not take any medicine to stop diarrhea until you have talked to your doctor  Diarrhea can occur 2 months or more after you stop taking this medicine  Tell any doctor or dentist who treats you that you are using this medicine  This medicine may affect certain medical test results  Call your doctor if your symptoms do not improve or if they get worse  The chewable tablet and oral liquid contain phenylalanine  Talk to your doctor before you use this medicine if you have phenylketonuria (PKU)  Keep all medicine out of the reach of children  Never share your medicine with anyone  Possible Side Effects While Using This Medicine:   Call your doctor right away if you notice any of these side effects: Allergic reaction: Itching or hives, swelling in your face or hands, swelling or tingling in your mouth or throat, chest tightness, trouble breathing  Blistering, peeling, red skin rash  Change in how much or how often you urinate  Dark urine or pale stools, nausea, vomiting, loss of appetite, stomach pain, yellow eyes or skin  Diarrhea that may contain blood, stomach cramps  If you notice these less serious side effects, talk with your doctor:   Diaper rash  Mild diarrhea, nausea, vomiting  Tooth discoloration (in children)  If you notice other side effects that you think are caused by this medicine, tell your doctor  Call your doctor for medical advice about side effects   You may report side effects to FDA at 8-265-FDA-0748    © Copyright [employer] 2022 Information is for [employer] use only and may not be sold, redistributed or otherwise used for commercial purposes  The above information is an  only  It is not intended as medical advice for individual conditions or treatments  Talk to your doctor, nurse or pharmacist before following any medical regimen to see if it is safe and effective for you

## 2022-10-26 NOTE — ASSESSMENT & PLAN NOTE
Advised to do not start ozempic until finishes antibiotic and will continue her current regimen  Lab Results   Component Value Date    HGBA1C 6 0 (H) 10/07/2022

## 2022-12-16 DIAGNOSIS — N18.1 TYPE 2 DIABETES MELLITUS WITH STAGE 1 CHRONIC KIDNEY DISEASE, WITHOUT LONG-TERM CURRENT USE OF INSULIN (HCC): ICD-10-CM

## 2022-12-16 DIAGNOSIS — E11.22 TYPE 2 DIABETES MELLITUS WITH STAGE 1 CHRONIC KIDNEY DISEASE, WITHOUT LONG-TERM CURRENT USE OF INSULIN (HCC): ICD-10-CM

## 2022-12-16 RX ORDER — SEMAGLUTIDE 1.34 MG/ML
0.5 INJECTION, SOLUTION SUBCUTANEOUS
Qty: 4.5 ML | Refills: 1 | Status: SHIPPED | OUTPATIENT
Start: 2022-12-16

## 2022-12-16 NOTE — TELEPHONE ENCOUNTER
Patient states she went to go refill this rx and she was told that the medication was stopped by the doctor  She is currently taking the 0 5mg which she states is helping and she will be on her last needle as on Monday  She wants to know why the medication was stopped and if there is something she needs to do? Can you please call in a refill?  She is starting to get results at the 0 5mg     Toño Charles MA

## 2022-12-20 ENCOUNTER — HOSPITAL ENCOUNTER (OUTPATIENT)
Dept: RADIOLOGY | Facility: HOSPITAL | Age: 57
Discharge: HOME | End: 2022-12-20
Attending: FAMILY MEDICINE

## 2022-12-20 VITALS — BODY MASS INDEX: 44.41 KG/M2 | WEIGHT: 293 LBS | HEIGHT: 68 IN

## 2022-12-20 DIAGNOSIS — Z12.31 BREAST CANCER SCREENING BY MAMMOGRAM: ICD-10-CM

## 2023-01-11 DIAGNOSIS — E11.8 TYPE 2 DIABETES MELLITUS WITH COMPLICATION, WITHOUT LONG-TERM CURRENT USE OF INSULIN (HCC): ICD-10-CM

## 2023-01-11 RX ORDER — SITAGLIPTIN 100 MG/1
TABLET, FILM COATED ORAL
Qty: 90 TABLET | Refills: 1 | Status: SHIPPED | OUTPATIENT
Start: 2023-01-11 | End: 2023-01-21

## 2023-01-11 RX ORDER — EMPAGLIFLOZIN 10 MG/1
TABLET, FILM COATED ORAL
Qty: 90 TABLET | Refills: 1 | Status: SHIPPED | OUTPATIENT
Start: 2023-01-11

## 2023-01-19 ENCOUNTER — OFFICE VISIT (OUTPATIENT)
Dept: OBGYN CLINIC | Facility: CLINIC | Age: 58
End: 2023-01-19

## 2023-01-19 VITALS — WEIGHT: 293 LBS | DIASTOLIC BLOOD PRESSURE: 80 MMHG | SYSTOLIC BLOOD PRESSURE: 130 MMHG | BODY MASS INDEX: 47.14 KG/M2

## 2023-01-19 DIAGNOSIS — N18.1 TYPE 2 DIABETES MELLITUS WITH STAGE 1 CHRONIC KIDNEY DISEASE, WITHOUT LONG-TERM CURRENT USE OF INSULIN (HCC): ICD-10-CM

## 2023-01-19 DIAGNOSIS — B37.2 YEAST INFECTION OF THE SKIN: Primary | ICD-10-CM

## 2023-01-19 DIAGNOSIS — E11.22 TYPE 2 DIABETES MELLITUS WITH STAGE 1 CHRONIC KIDNEY DISEASE, WITHOUT LONG-TERM CURRENT USE OF INSULIN (HCC): ICD-10-CM

## 2023-01-19 RX ORDER — FLUCONAZOLE 150 MG/1
150 TABLET ORAL DAILY
Qty: 30 TABLET | Refills: 0 | Status: SHIPPED | OUTPATIENT
Start: 2023-01-19 | End: 2023-02-18

## 2023-01-19 RX ORDER — NYSTATIN 100000 U/G
OINTMENT TOPICAL 2 TIMES DAILY
Qty: 30 G | Refills: 0 | Status: SHIPPED | OUTPATIENT
Start: 2023-01-19 | End: 2023-02-02

## 2023-01-19 NOTE — PROGRESS NOTES
Assessment/Plan:  Edwina Hernandez is a 56 y o  [id number] with vulvar yeast infection and history of recurrent yeast infection who presents for evaluation    1  Yeast infection of the skin  nystatin (MYCOSTATIN) ointment    fluconazole (DIFLUCAN) 150 mg tablet      2  Type 2 diabetes mellitus with stage 1 chronic kidney disease, without long-term current use of insulin ([facility])             · Given recurrent infections recommend extended azole treatment (daily for 3 days followed by weekly for 6 months  · Given severity of vulvar involvement will also prescribe topical treatment, recommend follow-up in 3 months to ensure healed fully  · Declines STI testing for financial reasons, will reach out when ready      Subjective:      Patient ID: Edwina Hernandez is a 56 y o  female      HPI  Vaginal/vulvar itchiness  At night very intense itchiness  Prone to yeast infections, but this feels like a yeast infection, but cuts are new  Doesn't have discharge  Had used oral pill once in July, resolved symptoms but then symptoms recurred and noted fissures for first time  Used topical with some improvement but not total  Had been using coconut oil and that helps keep it at bay    Seasonal allergies  No eczema  Tree allergy  Sensitive skin    No herpes  No STI  Is sexually active  In open relationship, but using condoms    The following portions of the patient's history were reviewed and updated as appropriate:   She  has a past medical history of Achilles tendinitis, unspecified leg (12/14/2006), Anxiety, Arthritis, Asthma, Bilateral leg edema, BMI 50 0-59 9, adult ([address] ), Breast lump (07/11/2008), Chest pain on breathing, Chronic kidney disease, stage 1, Clotting disorder ([address] ), Diabetes mellitus ([address] ), Eczema, Environmental allergies, Exposure to potentially hazardous body fluids, Heart failure, left, with LVEF 41-49% ([address] ), Hypertension (05/30/2012), Infectious mononucleosis, Lateral epicondylitis (02/17/2011), LBBB (left bundle branch block), LBBB (left bundle branch block) (2/8/2018), Lipoma, 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), Psychiatric disorder, Sialodochitis (06/01/2010), Situational anxiety, Sleep apnea, Tongue disorder, and Type 2 diabetes mellitus with kidney complication, without long-term current use of insulin ([facility] )    She   Patient Active Problem List    Diagnosis Date Noted   • Breast cancer screening by mammogram 10/18/2022   • BMI 45 0-49 9, adult ([facility]  ) 05/24/2022   • Adhesive capsulitis of left shoulder 03/23/2022   • Rotator cuff tendinitis, left 03/23/2022   • Biceps tendinitis on left 03/23/2022   • Vaginal yeast infection 01/10/2022   • Hyperlipidemia 11/10/2021   • Strain of long head of left biceps 10/15/2021   • Bilateral thigh pain 10/15/2021   • Myalgia 08/10/2021   • Word finding difficulty 04/13/2021   • Memory difficulties 04/13/2021   • History of 2019 novel coronavirus disease (COVID-19) 04/10/2021   • Healthcare maintenance 10/01/2020   • Routine gynecological examination 08/06/2020   • Breast cancer screening 03/02/2020   • Immunization due 03/02/2020   • Rash 12/11/2019   • Other hyperlipidemia 08/19/2019   • Colon cancer screening 11/02/2018   • Gynecologic exam normal 05/27/2018   • Type 2 diabetes mellitus with stage 1 chronic kidney disease, without long-term current use of insulin ([facility] ) 05/14/2018   • Allergic rhinitis due to allergen 05/14/2018   • Screening for cardiovascular, respiratory, and genitourinary diseases 04/23/2018   • Bilateral leg edema 02/08/2018   • LBBB (left bundle branch block) 02/08/2018   • Low back pain 01/10/2018   • CKD stage 1 due to type 2 diabetes mellitus ([facility] [address] ) 11/06/2016   • Heart failure, left, with LVEF 41-49% ([facility] ) 08/10/2016   • ARMANDO (obstructive sleep apnea) 07/22/2016   • Morbid obesity ([facility] ) 07/22/2016   • Chronic eczema 06/09/2016   • Degenerative arthritis of thoracic spine 02/13/2015   • Hypertensive heart and kidney disease with HF and with CKD stage I-IV ([facility] ) 09/04/2012 • Generalized anxiety disorder 09/04/2012     She  has a past surgical history that includes Cholecystectomy; Colonoscopy (1997); Gallbladder surgery (1996); Essure tubal ligation (2010); Cervical biopsy w/ loop electrode excision (1992); and Gynecologic cryosurgery (1992)  She is allergic to vioxx [rofecoxib]       Review of Systems   Gastrointestinal: Negative for abdominal pain  Genitourinary: Negative for difficulty urinating, dyspareunia, pelvic pain and vaginal discharge  Objective:  /80 (BP Location: Left arm, Patient Position: Sitting, Cuff Size: Standard)   Wt (!) 141 kg (310 lb)   LMP 12/23/2022 (Approximate)   BMI 47 14 kg/m²      Physical Exam  Constitutional:       Appearance: Normal appearance  HENT:      Head: Normocephalic  Eyes:      Extraocular Movements: Extraocular movements intact  Conjunctiva/sclera: Conjunctivae normal    Pulmonary:      Effort: Pulmonary effort is normal    Genitourinary:     Comments: Vulva: diffuse erythema involving bilateral labia with fissures clitoris and left labia minora, no lesions  Vagina: normal, no lesions or ttp  Urethra: normal, no lesions, masses or ttp  Bladder: normal, no masses or ttp  Cervix: normal, no lesions, masses or CMT  Uterus: normal-size, normal mobility, good descensus  Adnexa: no masses or ttp  Musculoskeletal:         General: Normal range of motion  Cervical back: Normal range of motion  Skin:     General: Skin is warm and dry  Neurological:      General: No focal deficit present  Mental Status: She is alert  Psychiatric:         Mood and Affect: Mood normal          Behavior: Behavior normal          Thought Content:  Thought content normal          wet mount: neg whiff, neg trich, neg BV, ++yeast

## 2023-01-21 LAB
BACTERIA GENITAL AEROBE CULT: ABNORMAL

## 2023-01-31 DIAGNOSIS — B37.2 YEAST INFECTION OF THE SKIN: ICD-10-CM

## 2023-01-31 RX ORDER — FLUCONAZOLE 150 MG/1
150 TABLET ORAL DAILY
Qty: 30 TABLET | Refills: 0 | Status: SHIPPED | OUTPATIENT
Start: 2023-01-31 | End: 2023-03-02

## 2023-01-31 NOTE — TELEPHONE ENCOUNTER
Pt called and states Diflucan needs prior auth as she will be taking weekly for 6 months  The insurance will not cover the prescribed quantity  Called [facility] at [phone] to initiate prior auth  Spoke with Licha Matute and provided all information requested  Diflucan was approved for a 30 day supply and the script is good from 1/1/2023-3/1/2023  Case # [id number]    Order pended with instructions for use

## 2023-02-07 ENCOUNTER — RA CDI HCC (OUTPATIENT)
Dept: OTHER | Facility: HOSPITAL | Age: 58
End: 2023-02-07

## 2023-02-07 NOTE — PROGRESS NOTES
[facility]  coding opportunities          Chart Reviewed number of suggestions sent to Provider: 1     Patients Insurance        Commercial Insurance: [address] [id number]

## 2023-02-15 ENCOUNTER — OFFICE VISIT (OUTPATIENT)
Dept: FAMILY MEDICINE CLINIC | Facility: CLINIC | Age: 58
End: 2023-02-15

## 2023-02-15 VITALS
HEART RATE: 88 BPM | TEMPERATURE: 97.3 F | HEIGHT: 68 IN | OXYGEN SATURATION: 98 % | WEIGHT: 293 LBS | DIASTOLIC BLOOD PRESSURE: 72 MMHG | BODY MASS INDEX: 44.41 KG/M2 | SYSTOLIC BLOOD PRESSURE: 118 MMHG | RESPIRATION RATE: 18 BRPM

## 2023-02-15 DIAGNOSIS — U07.1 COVID-19 VIRUS INFECTION: Primary | ICD-10-CM

## 2023-02-15 RX ORDER — FLUTICASONE PROPIONATE 110 UG/1
2 AEROSOL, METERED RESPIRATORY (INHALATION) 2 TIMES DAILY
Qty: 12 G | Refills: 0 | Status: SHIPPED | OUTPATIENT
Start: 2023-02-15

## 2023-02-15 RX ORDER — BENZONATATE 200 MG/1
200 CAPSULE ORAL 3 TIMES DAILY PRN
Qty: 30 CAPSULE | Refills: 0 | Status: SHIPPED | OUTPATIENT
Start: 2023-02-15

## 2023-02-15 RX ORDER — GUAIFENESIN AND CODEINE PHOSPHATE 100; 10 MG/5ML; MG/5ML
10 SOLUTION ORAL
Qty: 120 ML | Refills: 0 | Status: SHIPPED | OUTPATIENT
Start: 2023-02-15

## 2023-02-15 NOTE — PROGRESS NOTES
Assessment/Plan:    1  COVID-19 virus infection  -     benzonatate (TESSALON) 200 MG capsule; Take 1 capsule (200 mg total) by mouth 3 (three) times a day as needed for cough  -     guaifenesin-codeine (GUAIFENESIN AC) 100-10 MG/5ML liquid; Take 10 mL by mouth daily at bedtime as needed for cough  -     fluticasone (Flovent HFA) 110 MCG/ACT inhaler; Inhale 2 puffs 2 (two) times a day Rinse mouth after use  Depression Screening and Follow-up Plan: Patient was screened for depression during today's encounter  They screened negative with a PHQ-2 score of 0      vitamin c, d and zinc    Advised on isolation  Meds as directed  There are no Patient Instructions on file for this visit  No follow-ups on file  Subjective:      Patient ID: Jenifer Oh is a 56 y o  female  Chief Complaint   Patient presents with   • COVID-19     Tested positive for covid Tuesday morning  klcma   • Cough     Onset: 1 day     • Nasal Congestion   • Fever   • Shortness of Breath   • Fatigue   • Headache   • Chills       Pt is being seen for a same day appt  Pt states she has achs and pains  Diarrhea  Eyeballs hurt  Light sensitivity   HA  Cough - not productive  Nose runs  Sneezing  Fever  Pt states she has not bee wround sick people  Pt states she tested positive at home for covid on Tuesday  Day 1 was Monday - drippy nose    Pt states she is a little lightheaded when she ambulates - but thuis is not foreign to her  The following portions of the patient's history were reviewed and updated as appropriate: allergies, current medications, past family history, past medical history, past social history, past surgical history and problem list     Review of Systems   HENT: Positive for congestion  Respiratory: Positive for cough and shortness of breath  Musculoskeletal: Positive for myalgias           Current Outpatient Medications   Medication Sig Dispense Refill   • atorvastatin (LIPITOR) 10 mg tablet TAKE 1 TABLET DAILY 90 tablet 1   • benzonatate (TESSALON) 200 MG capsule Take 1 capsule (200 mg total) by mouth 3 (three) times a day as needed for cough 30 capsule 0   • carvedilol (COREG) 6 25 mg tablet Take 1 tablet (6 25 mg total) by mouth 2 (two) times a day with meals 180 tablet 1   • fluconazole (DIFLUCAN) 150 mg tablet Take 1 tablet (150 mg total) by mouth daily for 30 doses Take 1 tablet  ONCE A WEEK for 6 months 30 tablet 0   • fluticasone (Flovent HFA) 110 MCG/ACT inhaler Inhale 2 puffs 2 (two) times a day Rinse mouth after use  12 g 0   • guaifenesin-codeine (GUAIFENESIN AC) 100-10 MG/5ML liquid Take 10 mL by mouth daily at bedtime as needed for cough 120 mL 0   • Jardiance 10 MG TABS tablet TAKE 1 TABLET EVERY MORNING 90 tablet 1   • nystatin (MYCOSTATIN) ointment Apply topically 2 (two) times a day for 14 days 30 g 0   • sacubitril-valsartan (Entresto) 24-26 MG TABS AT THE START OF THERAPY TAKE 1 TABLET DAILY FOR 1 WEEK THEN INCREASE TO 1 TABLET TWICE A DAY THEREAFTER 180 tablet 3   • Semaglutide,0 25 or 0 5MG/DOS, (Ozempic, 0 25 or 0 5 MG/DOSE,) 2 MG/1 5ML SOPN Inject 0 5 mg under the skin every 7 days 4 5 mL 1   • spironolactone (ALDACTONE) 25 mg tablet Take 1 tablet (25 mg total) by mouth daily 90 tablet 1   • torsemide (DEMADEX) 10 mg tablet Once a day on Mon-Friday- Sunday as needed 45 tablet 1     No current facility-administered medications for this visit  Objective:    /72   Pulse 88   Temp (!) 97 3 °F (36 3 °C)   Resp 18   Ht 5' 8" (1 727 m)   Wt (!) 139 kg (307 lb)   SpO2 98%   BMI 46 68 kg/m²        Physical Exam  HENT:      Nose: Congestion and rhinorrhea present  Mouth/Throat:      Pharynx: Posterior oropharyngeal erythema present  Pulmonary:      Breath sounds: No wheezing                  Padmaja Simon, DO

## 2023-02-19 PROBLEM — Z86.16 HISTORY OF 2019 NOVEL CORONAVIRUS DISEASE (COVID-19): Status: RESOLVED | Noted: 2021-04-10 | Resolved: 2023-02-19

## 2023-02-19 PROBLEM — E78.5 HYPERLIPIDEMIA: Status: RESOLVED | Noted: 2021-11-10 | Resolved: 2023-02-19

## 2023-02-19 PROBLEM — Z12.39 BREAST CANCER SCREENING: Status: RESOLVED | Noted: 2020-03-02 | Resolved: 2023-02-19

## 2023-02-27 DIAGNOSIS — I44.7 LBBB (LEFT BUNDLE BRANCH BLOCK): ICD-10-CM

## 2023-02-27 DIAGNOSIS — E78.49 OTHER HYPERLIPIDEMIA: ICD-10-CM

## 2023-02-27 RX ORDER — SACUBITRIL AND VALSARTAN 24; 26 MG/1; MG/1
TABLET, FILM COATED ORAL
Qty: 180 TABLET | Refills: 3 | Status: SHIPPED | OUTPATIENT
Start: 2023-02-27

## 2023-03-17 LAB
ALBUMIN SERPL-MCNC: 4.3 G/DL (ref 3.8–4.9)
ALBUMIN/GLOB SERPL: 1.4 {RATIO} (ref 1.2–2.2)
ALP SERPL-CCNC: 81 IU/L (ref 44–121)
ALT SERPL-CCNC: 15 IU/L (ref 0–32)
AST SERPL-CCNC: 16 IU/L (ref 0–40)
BILIRUB SERPL-MCNC: 0.8 MG/DL (ref 0–1.2)
BUN SERPL-MCNC: 17 MG/DL (ref 6–24)
BUN/CREAT SERPL: 23 (ref 9–23)
CALCIUM SERPL-MCNC: 8.9 MG/DL (ref 8.7–10.2)
CHLORIDE SERPL-SCNC: 101 MMOL/L (ref 96–106)
CO2 SERPL-SCNC: 24 MMOL/L (ref 20–29)
CREAT SERPL-MCNC: 0.74 MG/DL (ref 0.57–1)
EGFR: 95 ML/MIN/1.73
GLOBULIN SER-MCNC: 3.1 G/DL (ref 1.5–4.5)
GLUCOSE SERPL-MCNC: 128 MG/DL (ref 70–99)
HBA1C MFR BLD: 6.6 % (ref 4.8–5.6)
POTASSIUM SERPL-SCNC: 4.2 MMOL/L (ref 3.5–5.2)
PROT SERPL-MCNC: 7.4 G/DL (ref 6–8.5)
SODIUM SERPL-SCNC: 138 MMOL/L (ref 134–144)

## 2023-03-23 ENCOUNTER — OFFICE VISIT (OUTPATIENT)
Dept: OBGYN CLINIC | Facility: CLINIC | Age: 58
End: 2023-03-23

## 2023-03-23 VITALS
SYSTOLIC BLOOD PRESSURE: 122 MMHG | HEIGHT: 68 IN | DIASTOLIC BLOOD PRESSURE: 68 MMHG | BODY MASS INDEX: 44.41 KG/M2 | WEIGHT: 293 LBS

## 2023-03-23 DIAGNOSIS — N18.1 TYPE 2 DIABETES MELLITUS WITH STAGE 1 CHRONIC KIDNEY DISEASE, WITHOUT LONG-TERM CURRENT USE OF INSULIN (HCC): ICD-10-CM

## 2023-03-23 DIAGNOSIS — N76.0 RECURRENT VAGINITIS: Primary | ICD-10-CM

## 2023-03-23 DIAGNOSIS — E11.22 TYPE 2 DIABETES MELLITUS WITH STAGE 1 CHRONIC KIDNEY DISEASE, WITHOUT LONG-TERM CURRENT USE OF INSULIN (HCC): ICD-10-CM

## 2023-03-27 ENCOUNTER — OFFICE VISIT (OUTPATIENT)
Dept: FAMILY MEDICINE CLINIC | Facility: CLINIC | Age: 58
End: 2023-03-27

## 2023-03-27 VITALS
SYSTOLIC BLOOD PRESSURE: 120 MMHG | WEIGHT: 293 LBS | RESPIRATION RATE: 18 BRPM | TEMPERATURE: 99.6 F | HEART RATE: 82 BPM | DIASTOLIC BLOOD PRESSURE: 70 MMHG | BODY MASS INDEX: 47.29 KG/M2

## 2023-03-27 DIAGNOSIS — N18.1 TYPE 2 DIABETES MELLITUS WITH STAGE 1 CHRONIC KIDNEY DISEASE, WITHOUT LONG-TERM CURRENT USE OF INSULIN (HCC): ICD-10-CM

## 2023-03-27 DIAGNOSIS — Z13.89 SCREENING FOR CARDIOVASCULAR, RESPIRATORY, AND GENITOURINARY DISEASES: ICD-10-CM

## 2023-03-27 DIAGNOSIS — Z12.11 COLON CANCER SCREENING: ICD-10-CM

## 2023-03-27 DIAGNOSIS — E78.49 OTHER HYPERLIPIDEMIA: ICD-10-CM

## 2023-03-27 DIAGNOSIS — Z13.83 SCREENING FOR CARDIOVASCULAR, RESPIRATORY, AND GENITOURINARY DISEASES: ICD-10-CM

## 2023-03-27 DIAGNOSIS — J01.00 ACUTE NON-RECURRENT MAXILLARY SINUSITIS: Primary | ICD-10-CM

## 2023-03-27 DIAGNOSIS — E11.22 TYPE 2 DIABETES MELLITUS WITH STAGE 1 CHRONIC KIDNEY DISEASE, WITHOUT LONG-TERM CURRENT USE OF INSULIN (HCC): ICD-10-CM

## 2023-03-27 DIAGNOSIS — Z13.6 SCREENING FOR CARDIOVASCULAR, RESPIRATORY, AND GENITOURINARY DISEASES: ICD-10-CM

## 2023-03-27 RX ORDER — GUAIFENESIN AND DEXTROMETHORPHAN HYDROBROMIDE 600; 30 MG/1; MG/1
1 TABLET, EXTENDED RELEASE ORAL EVERY 12 HOURS SCHEDULED
Qty: 60 TABLET | Refills: 1 | Status: SHIPPED | OUTPATIENT
Start: 2023-03-27

## 2023-03-27 RX ORDER — FLUCONAZOLE 150 MG/1
TABLET ORAL
COMMUNITY
Start: 2023-03-26

## 2023-03-27 RX ORDER — BENZONATATE 200 MG/1
200 CAPSULE ORAL 3 TIMES DAILY PRN
Qty: 60 CAPSULE | Refills: 0 | Status: SHIPPED | OUTPATIENT
Start: 2023-03-27

## 2023-03-27 RX ORDER — AMOXICILLIN AND CLAVULANATE POTASSIUM 875; 125 MG/1; MG/1
1 TABLET, FILM COATED ORAL 2 TIMES DAILY
Qty: 20 TABLET | Refills: 0 | Status: SHIPPED | OUTPATIENT
Start: 2023-03-27 | End: 2023-04-06

## 2023-03-27 NOTE — PROGRESS NOTES
Assessment/Plan:    No problem-specific Assessment & Plan notes found for this encounter  Sinusitis new  mucinex dm and abx advised  Tessalon prn  F/u if no better    DM2 stable  Labs reviewed  Increase ozempic 0 5mg to 1mg/d for better control with intent to reduce jardiance if future, if ok with cardiology, due to recurrent vaginitis on weekly fluconazole from gyn    HLD stable on statin for risk reduction, continue use     Diagnoses and all orders for this visit:    Acute non-recurrent maxillary sinusitis  -     guaiFENesin-dextromethorphan (MUCINEX DM) 600-30 mg; Take 1 tablet by mouth every 12 (twelve) hours  -     benzonatate (TESSALON) 200 MG capsule; Take 1 capsule (200 mg total) by mouth 3 (three) times a day as needed for cough  -     amoxicillin-clavulanate (AUGMENTIN) 875-125 mg per tablet; Take 1 tablet by mouth 2 (two) times a day for 10 days    Type 2 diabetes mellitus with stage 1 chronic kidney disease, without long-term current use of insulin (HCC)  -     Hemoglobin A1C; Future  -     Comprehensive metabolic panel; Future  -     Microalbumin / creatinine urine ratio; Future  -     semaglutide, 1 mg/dose, (Ozempic) 4 mg/3 mL injection pen; Inject 0 75 mL (1 mg total) under the skin once a week    Screening for cardiovascular, respiratory, and genitourinary diseases  -     Lipid Panel with Direct LDL reflex; Future    Colon cancer screening  -     Ambulatory referral for colonoscopy; Future    Other hyperlipidemia    Other orders  -     fluconazole (DIFLUCAN) 150 mg tablet        Return in about 14 weeks (around 7/3/2023) for Recheck  Subjective:      Patient ID: Edie Alegria is a 56 y o  female      Chief Complaint   Patient presents with   • URI     COVID neg            sas/cma   • Diabetes       HPI  Wt going up  Not exercising  Busy with Thao Done (son)  Watching calorie intake  No appetite control on current dose 0 5mg/week ozempic    Cough  Home covid neg x 3 different days  Congestion  Sinus pressure  Chest heavy  No mucus seen  Green nasal discharge  3rd day  Tessalon helps  dayquill and nyquill per pt  On diflucan qweek x 6m from gyn    The following portions of the patient's history were reviewed and updated as appropriate: allergies, current medications, past family history, past medical history, past social history, past surgical history and problem list     Review of Systems   HENT: Positive for congestion  Respiratory: Negative for shortness of breath and wheezing  Current Outpatient Medications   Medication Sig Dispense Refill   • amoxicillin-clavulanate (AUGMENTIN) 875-125 mg per tablet Take 1 tablet by mouth 2 (two) times a day for 10 days 20 tablet 0   • atorvastatin (LIPITOR) 10 mg tablet TAKE 1 TABLET DAILY 90 tablet 1   • benzonatate (TESSALON) 200 MG capsule Take 1 capsule (200 mg total) by mouth 3 (three) times a day as needed for cough 60 capsule 0   • carvedilol (COREG) 6 25 mg tablet Take 1 tablet (6 25 mg total) by mouth 2 (two) times a day with meals 180 tablet 1   • Entresto 24-26 MG TABS AT THE START OF THERAPY TAKE 1 TABLET DAILY FOR 1 WEEK THEN INCREASE TO 1 TABLET TWICE A DAY THEREAFTER 180 tablet 3   • fluconazole (DIFLUCAN) 150 mg tablet      • guaiFENesin-dextromethorphan (MUCINEX DM) 600-30 mg Take 1 tablet by mouth every 12 (twelve) hours 60 tablet 1   • Jardiance 10 MG TABS tablet TAKE 1 TABLET EVERY MORNING 90 tablet 1   • semaglutide, 1 mg/dose, (Ozempic) 4 mg/3 mL injection pen Inject 0 75 mL (1 mg total) under the skin once a week 3 mL 5   • spironolactone (ALDACTONE) 25 mg tablet Take 1 tablet (25 mg total) by mouth daily 90 tablet 1   • torsemide (DEMADEX) 10 mg tablet Once a day on Mon-Friday- Sunday as needed 45 tablet 1   • nystatin (MYCOSTATIN) ointment Apply topically 2 (two) times a day for 14 days 30 g 0     No current facility-administered medications for this visit         Objective:    /70   Pulse 82   Temp 99 6 °F (37 6 °C)   Resp 18 Wt (!) 141 kg (311 lb)   LMP 01/11/2023 (Approximate)   BMI 47 29 kg/m²        Physical Exam  Vitals and nursing note reviewed  Constitutional:       Appearance: She is well-developed  She is obese  She is not ill-appearing  HENT:      Head: Normocephalic  Right Ear: Tympanic membrane normal       Left Ear: Tympanic membrane normal       Nose: Congestion present  Eyes:      General: No scleral icterus  Conjunctiva/sclera: Conjunctivae normal    Cardiovascular:      Rate and Rhythm: Normal rate and regular rhythm  Pulses: no weak pulses          Dorsalis pedis pulses are 2+ on the right side and 2+ on the left side  Pulmonary:      Effort: Pulmonary effort is normal  No respiratory distress  Breath sounds: No wheezing  Abdominal:      Palpations: Abdomen is soft  Musculoskeletal:         General: No deformity  Cervical back: Neck supple  No rigidity  Skin:     General: Skin is warm and dry  Coloration: Skin is not pale  Neurological:      Mental Status: She is alert  Psychiatric:         Mood and Affect: Mood normal          Behavior: Behavior normal          Thought Content: Thought content normal        Diabetic Foot Exam    Patient's shoes and socks removed  Right Foot/Ankle   Right Foot Inspection  Skin Exam: skin intact  No abnormal color  Sensory   Monofilament testing: intact    Vascular  The right DP pulse is 2+  Left Foot/Ankle  Left Foot Inspection  Skin Exam: skin intact  Normal color  Sensory   Monofilament testing: intact    Vascular  The left DP pulse is 2+       Assign Risk Category  No deformity present  No loss of protective sensation  No weak pulses  Risk: 0           Ronn Vizcarra DO

## 2023-05-04 DIAGNOSIS — R60.0 BILATERAL LEG EDEMA: ICD-10-CM

## 2023-05-04 DIAGNOSIS — I44.7 LBBB (LEFT BUNDLE BRANCH BLOCK): ICD-10-CM

## 2023-05-04 RX ORDER — SPIRONOLACTONE 25 MG/1
TABLET ORAL
Qty: 90 TABLET | Refills: 3 | Status: SHIPPED | OUTPATIENT
Start: 2023-05-04

## 2023-05-04 RX ORDER — CARVEDILOL 6.25 MG/1
TABLET ORAL
Qty: 180 TABLET | Refills: 3 | Status: SHIPPED | OUTPATIENT
Start: 2023-05-04

## 2023-05-12 ENCOUNTER — HOSPITAL ENCOUNTER (EMERGENCY)
Facility: HOSPITAL | Age: 58
Discharge: HOME | End: 2023-05-12
Attending: EMERGENCY MEDICINE

## 2023-05-12 ENCOUNTER — TELEPHONE (OUTPATIENT)
Dept: OBGYN CLINIC | Facility: CLINIC | Age: 58
End: 2023-05-12

## 2023-05-12 VITALS
BODY MASS INDEX: 46.93 KG/M2 | TEMPERATURE: 97.5 F | SYSTOLIC BLOOD PRESSURE: 124 MMHG | OXYGEN SATURATION: 96 % | HEART RATE: 80 BPM | DIASTOLIC BLOOD PRESSURE: 65 MMHG | WEIGHT: 293 LBS | RESPIRATION RATE: 16 BRPM

## 2023-05-12 DIAGNOSIS — A60.9 HSV (HERPES SIMPLEX VIRUS) ANOGENITAL INFECTION: Primary | ICD-10-CM

## 2023-05-12 DIAGNOSIS — R10.2 VAGINAL PAIN: Primary | ICD-10-CM

## 2023-05-12 LAB
BACTERIA UR QL AUTO: ABNORMAL /HPF
BILIRUB UR QL STRIP: NEGATIVE
BUDDING YEAST: PRESENT
CLARITY UR: CLEAR
COLOR UR: ABNORMAL
GLUCOSE UR STRIP-MCNC: ABNORMAL MG/DL
HGB UR QL STRIP.AUTO: ABNORMAL
KETONES UR STRIP-MCNC: NEGATIVE MG/DL
LEUKOCYTE ESTERASE UR QL STRIP: ABNORMAL
NITRITE UR QL STRIP: NEGATIVE
NON-SQ EPI CELLS URNS QL MICRO: ABNORMAL /HPF
PH UR STRIP.AUTO: 5 [PH]
PROT UR STRIP-MCNC: NEGATIVE MG/DL
RBC #/AREA URNS AUTO: ABNORMAL /HPF
SP GR UR STRIP.AUTO: 1.03 (ref 1–1.03)
UROBILINOGEN UR STRIP-ACNC: <2 MG/DL
WBC #/AREA URNS AUTO: ABNORMAL /HPF

## 2023-05-12 RX ORDER — VALACYCLOVIR HYDROCHLORIDE 1 G/1
1000 TABLET, FILM COATED ORAL 2 TIMES DAILY
Qty: 20 TABLET | Refills: 0 | Status: SHIPPED | OUTPATIENT
Start: 2023-05-12 | End: 2023-05-22

## 2023-05-12 RX ORDER — LIDOCAINE HYDROCHLORIDE 20 MG/ML
JELLY TOPICAL ONCE
Status: COMPLETED | OUTPATIENT
Start: 2023-05-12 | End: 2023-05-12

## 2023-05-12 RX ORDER — LIDOCAINE AND PRILOCAINE 25; 25 MG/G; MG/G
CREAM TOPICAL AS NEEDED
Qty: 30 G | Refills: 0 | Status: SHIPPED | OUTPATIENT
Start: 2023-05-12 | End: 2023-05-19

## 2023-05-12 RX ORDER — LIDOCAINE AND PRILOCAINE 25; 25 MG/G; MG/G
CREAM TOPICAL AS NEEDED
Qty: 30 G | Refills: 0 | Status: SHIPPED | OUTPATIENT
Start: 2023-05-12 | End: 2023-05-12 | Stop reason: SDUPTHER

## 2023-05-12 RX ADMIN — LIDOCAINE HYDROCHLORIDE 5 APPLICATION.: 20 JELLY TOPICAL at 11:04

## 2023-05-12 NOTE — ED PROVIDER NOTES
History  Chief Complaint   Patient presents with   • Vaginal Pain     Pt states she has been having severe vaginal pain and swelling  for the last few days, worsens with movement and urination  Pt is taking Fluconazole for yeast infections once a week for a few months      70-year-old female history of diabetes, obesity, presents today with 2 days of severe vaginal pain  She states it has progressively worsened over the last few days  She states she is sexually active with multiple male partners with sporadic use of condoms  She describes the pain as very sharp and worse with manipulation of the labia  Patient denies vaginal discharge, urinary symptoms including urgency, frequency, dysuria, fever/chills, nausea/vomiting, difficulty having a bowel movement  Prior to Admission Medications   Prescriptions Last Dose Informant Patient Reported? Taking?    Entresto 24-26 MG TABS   No No   Sig: AT THE START OF THERAPY TAKE 1 TABLET DAILY FOR 1 WEEK THEN INCREASE TO 1 TABLET TWICE A DAY THEREAFTER   Jardiance 10 MG TABS tablet   No No   Sig: TAKE 1 TABLET EVERY MORNING   atorvastatin (LIPITOR) 10 mg tablet   No No   Sig: TAKE 1 TABLET DAILY   benzonatate (TESSALON) 200 MG capsule   No No   Sig: Take 1 capsule (200 mg total) by mouth 3 (three) times a day as needed for cough   carvedilol (COREG) 6 25 mg tablet   No No   Sig: TAKE 1 TABLET TWICE A DAY WITH MEALS   fluconazole (DIFLUCAN) 150 mg tablet   Yes No   guaiFENesin-dextromethorphan (MUCINEX DM) 600-30 mg   No No   Sig: Take 1 tablet by mouth every 12 (twelve) hours   nystatin (MYCOSTATIN) ointment   No No   Sig: Apply topically 2 (two) times a day for 14 days   semaglutide, 1 mg/dose, (Ozempic) 4 mg/3 mL injection pen   No No   Sig: Inject 0 75 mL (1 mg total) under the skin once a week   spironolactone (ALDACTONE) 25 mg tablet   No No   Sig: TAKE 1 TABLET DAILY   torsemide (DEMADEX) 10 mg tablet   No No   Sig: Once a day on Mon-Friday- Sunday as needed Facility-Administered Medications: None       Past Medical History:   Diagnosis Date   • Achilles tendinitis, unspecified leg 12/14/2006   • Anxiety    • Arthritis     Last Assessed: 9/28/2017   • Asthma     Last Assessed: 9/28/2017   • Bilateral leg edema    • BMI 50 0-59 9, adult ([facility])    • Breast lump 07/11/2008   • Chest pain on breathing     Last Assessed: 3/9/2014   • Chronic kidney disease, stage 1     Last Assessed: 3/1/2014   • Clotting disorder ([facility] )    • Diabetes mellitus ([facility] )     Last Assessed: 9/28/2017 Type 2 with renal manifestations, controlled   • Eczema    • Environmental allergies     Last Assessed: 9/28/2017   • Exposure to potentially hazardous body fluids     Last Assessed: 4/10/2015   • Heart failure, left, with LVEF 41-49% ([facility] )    • Hypertension 05/30/2012    Benign Essential   • Infectious mononucleosis     Resolved: 8/19/2015   • Lateral epicondylitis 02/17/2011    unspecified laterality   • LBBB (left bundle branch block)    • LBBB (left bundle branch block) 2/8/2018   • Lipoma     Last Assessesd: 10/13/2014   • 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   • Psychiatric disorder    • Sialodochitis 06/01/2010   • Situational anxiety     Last Assessed: 10/22/2015   • Sleep apnea    • Tongue disorder     Last Assessed: 6/9/2016   • Type 2 diabetes mellitus with kidney complication, without long-term current use of insulin ([facility] )        Past Surgical History:   Procedure Laterality Date   • CERVICAL BIOPSY  W/ LOOP ELECTRODE EXCISION  1992   • CHOLECYSTECTOMY     • COLONOSCOPY  1997   • ESSURE TUBAL LIGATION  2010   • GALLBLADDER SURGERY  1996   • GYNECOLOGIC CRYOSURGERY  1992       Family History   Problem Relation Age of Onset   • Heart attack Mother    • Diabetes type II Mother    • Heart disease Father    • Atrial fibrillation Father    • Arthritis Father    • Arthritis Sister    • Other Other         Cardiac Disorder   • Arthritis Family    • Breast cancer Maternal Grandmother 61   • Heart disease Maternal Grandmother    • Bone cancer Maternal Grandfather    • Brain cancer Paternal Grandfather    • No Known Problems Maternal Aunt      I have reviewed and agree with the history as documented  E-Cigarette/Vaping   • E-Cigarette Use Never User      E-Cigarette/Vaping Substances   • Nicotine No    • THC No    • CBD No    • Flavoring No    • Other No    • Unknown No      Social History     Tobacco Use   • Smoking status: Never   • Smokeless tobacco: Never   • Tobacco comments:     N/a   Vaping Use   • Vaping Use: Never used   Substance Use Topics   • Alcohol use: Never   • Drug use: Never        Review of Systems   Constitutional: Negative for chills and fever  HENT: Negative for congestion, rhinorrhea and sneezing  Eyes: Negative for pain and visual disturbance  Respiratory: Negative for cough and shortness of breath  Cardiovascular: Negative for chest pain and palpitations  Gastrointestinal: Negative for abdominal pain, constipation, diarrhea, nausea and vomiting  Genitourinary: Positive for genital sores and vaginal pain  Negative for difficulty urinating, dysuria, flank pain, hematuria, vaginal bleeding and vaginal discharge  Musculoskeletal: Negative for arthralgias and back pain  Skin: Negative for color change and rash  Neurological: Negative for syncope, weakness, numbness and headaches  All other systems reviewed and are negative  Physical Exam  ED Triage Vitals [05/12/23 1018]   Temperature Pulse Respirations Blood Pressure SpO2   97 5 °F (36 4 °C) 80 16 124/65 96 %      Temp Source Heart Rate Source Patient Position - Orthostatic VS BP Location FiO2 (%)   Oral Monitor Lying Right arm --      Pain Score       --             Orthostatic Vital Signs  Vitals:    05/12/23 1018   BP: 124/65   Pulse: 80   Patient Position - Orthostatic VS: Lying       Physical Exam  Vitals and nursing note reviewed  Exam conducted with a chaperone present     Constitutional:       General: She is not in acute distress  Appearance: She is not ill-appearing  HENT:      Head: Normocephalic and atraumatic  Right Ear: External ear normal       Left Ear: External ear normal       Nose: Nose normal  No congestion or rhinorrhea  Mouth/Throat:      Mouth: Mucous membranes are moist       Pharynx: Oropharynx is clear  Eyes:      General: No scleral icterus  Extraocular Movements: Extraocular movements intact  Cardiovascular:      Rate and Rhythm: Normal rate and regular rhythm  Pulses: Normal pulses  Heart sounds: Normal heart sounds  Pulmonary:      Effort: Pulmonary effort is normal       Breath sounds: Normal breath sounds  Abdominal:      Palpations: Abdomen is soft  Tenderness: There is no abdominal tenderness  Genitourinary:     Labia:         Right: Tenderness and lesion (Multiple herpetic ulcers painful to palpation ) present  Left: Tenderness and lesion present  Vagina: No vaginal discharge  Musculoskeletal:         General: Normal range of motion  Cervical back: Normal range of motion  Skin:     General: Skin is warm and dry  Neurological:      General: No focal deficit present  Mental Status: She is alert and oriented to person, place, and time  Psychiatric:         Mood and Affect: Mood normal          Behavior: Behavior normal          ED Medications  Medications   lidocaine (URO-JET) 2 % urethral/mucosal gel (5 application   Topical Given 5/12/23 1104)       Diagnostic Studies  Results Reviewed     Procedure Component Value Units Date/Time    Urine Microscopic [[id number]]  (Abnormal) Collected: 05/12/23 1031    Lab Status: Final result Specimen: Urine, Clean Catch Updated: 05/12/23 1303     RBC, UA 4-10 /hpf      WBC, UA 4-10 /hpf      Epithelial Cells Occasional /hpf      Bacteria, UA Occasional /hpf      Budding Yeast Present    UA w Reflex to Microscopic w Reflex to Culture [[id number]]  (Abnormal) Collected: 05/12/23 1031 Lab Status: Final result Specimen: Urine, Clean Catch Updated: 05/12/23 1128     Color, UA Light Yellow     Clarity, UA Clear     Specific Gravity, UA 1 033     pH, UA 5 0     Leukocytes, UA Elevated glucose may cause decreased leukocyte values  See urine microscopic for UWBC result     Nitrite, UA Negative     Protein, UA Negative mg/dl      Glucose, UA >=1000 (1%) mg/dl      Ketones, UA Negative mg/dl      Urobilinogen, UA <2 0 mg/dl      Bilirubin, UA Negative     Occult Blood, UA Trace    Chlamydia/GC amplified DNA by PCR [[id number]] Updated: 05/12/23 1116    Lab Status: In process                  No orders to display         Procedures  Procedures      ED Course                                       Medical Decision Making  Patient presented with severe vaginal pain  She has high risk sexual behavior with multiple male partners  She states she has no history of STIs  Physical exam was significant for multiple herpetic appearing lesions on both sides of the labia  Patient's UA showed no acute infections  Gonorrhea/chlamydia urine test pending  Patient be treated for likely herpes outbreak with 10 days of valacyclovir  Patient was also given lidocaine gel for symptomatic relief  Patient was given instructions to follow closely with her PCP for further management  Patient understands and agrees with this plan  Patient discharged in stable condition  Strict return precautions given if symptoms are worsening or not resolving  Amount and/or Complexity of Data Reviewed  Labs: ordered  Risk  Prescription drug management              Disposition  Final diagnoses:   HSV (herpes simplex virus) anogenital infection     Time reflects when diagnosis was documented in both MDM as applicable and the Disposition within this note     Time User Action Codes Description Comment    5/12/2023 11:30 AM August Gianna Add [A60 9] HSV (herpes simplex virus) anogenital infection       ED Disposition     ED Disposition   Discharge    Condition   Stable    Date/Time   Fri May 12, 2023 11:30 AM    Comment   Abdoulaye Coello discharge to home/self care                 Follow-up Information     Follow up With Specialties Details Why Contact Info Additional Ban Wong,  Family Medicine Call in 3 days For ED follow-up [address] Emergency Department Emergency Medicine Go to  If symptoms worsen or do not resolve [address] Emergency Department, [address], [place], [place], [address]          Discharge Medication List as of 5/12/2023 11:34 AM      START taking these medications    Details   valACYclovir (VALTREX) 1,000 mg tablet Take 1 tablet (1,000 mg total) by mouth 2 (two) times a day for 10 days, Starting Fri 5/12/2023, Until Mon 5/22/2023, Normal         CONTINUE these medications which have CHANGED    Details   lidocaine-prilocaine (EMLA) cream Apply topically as needed for mild pain for up to 7 days Apply 2 5 g over skin area, Starting Fri 5/12/2023, Until Fri 5/19/2023 at 2359, Normal         CONTINUE these medications which have NOT CHANGED    Details   atorvastatin (LIPITOR) 10 mg tablet TAKE 1 TABLET DAILY, Normal      benzonatate (TESSALON) 200 MG capsule Take 1 capsule (200 mg total) by mouth 3 (three) times a day as needed for cough, Starting Mon 3/27/2023, Normal      carvedilol (COREG) 6 25 mg tablet TAKE 1 TABLET TWICE A DAY WITH MEALS, Normal      Entresto 24-26 MG TABS AT THE START OF THERAPY TAKE 1 TABLET DAILY FOR 1 WEEK THEN INCREASE TO 1 TABLET TWICE A DAY THEREAFTER, Normal      fluconazole (DIFLUCAN) 150 mg tablet Starting Sun 3/26/2023, Historical Med      guaiFENesin-dextromethorphan (MUCINEX DM) 600-30 mg Take 1 tablet by mouth every 12 (twelve) hours, Starting Mon 3/27/2023, Normal Jardiance 10 MG TABS tablet TAKE 1 TABLET EVERY MORNING, Normal      nystatin (MYCOSTATIN) ointment Apply topically 2 (two) times a day for 14 days, Starting Thu 1/19/2023, Until Wed 2/15/2023, Normal      semaglutide, 1 mg/dose, (Ozempic) 4 mg/3 mL injection pen Inject 0 75 mL (1 mg total) under the skin once a week, Starting Mon 3/27/2023, Normal      spironolactone (ALDACTONE) 25 mg tablet TAKE 1 TABLET DAILY, Normal      torsemide (DEMADEX) 10 mg tablet Once a day on Mon-Friday- Sunday as needed, Normal           No discharge procedures on file  PDMP Review       Value Time User    PDMP Reviewed  Yes 3/23/2022 11:50 AM Edi Younger DO           ED Provider  Attending physically available and evaluated Shamarguillaume Analilia  SLOANE managed the patient along with the ED Attending      Electronically Signed by         Octavia Angela DO  05/12/23 1200

## 2023-05-12 NOTE — TELEPHONE ENCOUNTER
Patient is calling because she is having extreme pain and swelling in her vaginal area. She also feels very nauseous. Patient said this started yesterday. She notices 2 lumps, denies any itching or discharge. She is not having fever or chills. Patient aware we recommend ER evaluation. It is more convenient for patient to go to the [facility].

## 2023-05-15 LAB
C TRACH DNA SPEC QL NAA+PROBE: NEGATIVE
N GONORRHOEA DNA SPEC QL NAA+PROBE: NEGATIVE

## 2023-05-22 NOTE — ED ATTENDING ATTESTATION
5/12/2023  [facility] Rory, DO, saw and evaluated the patient  I have discussed the patient with the resident/non-physician practitioner and agree with the resident's/non-physician practitioner's findings, Plan of Care, and MDM as documented in the resident's/non-physician practitioner's note, except where noted  All available labs and Radiology studies were reviewed  I was present for key portions of any procedure(s) performed by the resident/non-physician practitioner and I was immediately available to provide assistance  At this point I agree with the current assessment done in the Emergency Department    I have conducted an independent evaluation of this patient a history and physical is as follows:    ED Course         Critical Care Time  Procedures

## 2023-05-25 DIAGNOSIS — B00.9 HERPES: Primary | ICD-10-CM

## 2023-05-25 RX ORDER — VALACYCLOVIR HYDROCHLORIDE 1 G/1
1000 TABLET, FILM COATED ORAL 2 TIMES DAILY
Qty: 10 TABLET | Refills: 0 | Status: SHIPPED | OUTPATIENT
Start: 2023-05-25 | End: 2023-05-30

## 2023-05-25 NOTE — TELEPHONE ENCOUNTER
Pt called because she was in the ER and found out she has herpes  She finished the medication that was prescribed to her and she is still having symptoms  Was wondering if she needs to be in continuous ? Told her I would reach out to a provider to see what is recommended and get back to her  Patient understood

## 2023-07-06 ENCOUNTER — RA CDI HCC (OUTPATIENT)
Dept: OTHER | Facility: HOSPITAL | Age: 58
End: 2023-07-06

## 2023-07-06 NOTE — PROGRESS NOTES
[address] coding opportunities       Chart Reviewed number of suggestions sent to Provider: 1     Patients Insurance     Commercial Insurance: Roberto Greenwood       E66.01: Morbid (severe) obesity due to excess calories ([address]) [1194222]    Per CMS/ICD 10 coding guidelines, to be used when BMI >=40

## 2023-07-10 DIAGNOSIS — E11.8 TYPE 2 DIABETES MELLITUS WITH COMPLICATION, WITHOUT LONG-TERM CURRENT USE OF INSULIN (HCC): ICD-10-CM

## 2023-07-11 ENCOUNTER — OFFICE VISIT (OUTPATIENT)
Dept: FAMILY MEDICINE CLINIC | Facility: CLINIC | Age: 58
End: 2023-07-11
Payer: COMMERCIAL

## 2023-07-11 VITALS
DIASTOLIC BLOOD PRESSURE: 64 MMHG | HEART RATE: 77 BPM | SYSTOLIC BLOOD PRESSURE: 118 MMHG | TEMPERATURE: 98 F | RESPIRATION RATE: 16 BRPM | WEIGHT: 293 LBS | BODY MASS INDEX: 44.41 KG/M2 | OXYGEN SATURATION: 95 % | HEIGHT: 68 IN

## 2023-07-11 DIAGNOSIS — N18.2 CKD STAGE 2 DUE TO TYPE 2 DIABETES MELLITUS (HCC): ICD-10-CM

## 2023-07-11 DIAGNOSIS — E66.01 MORBID OBESITY (HCC): ICD-10-CM

## 2023-07-11 DIAGNOSIS — E11.22 TYPE 2 DIABETES MELLITUS WITH STAGE 2 CHRONIC KIDNEY DISEASE, WITHOUT LONG-TERM CURRENT USE OF INSULIN (HCC): Primary | ICD-10-CM

## 2023-07-11 DIAGNOSIS — Z12.11 COLON CANCER SCREENING: ICD-10-CM

## 2023-07-11 DIAGNOSIS — N18.2 TYPE 2 DIABETES MELLITUS WITH STAGE 2 CHRONIC KIDNEY DISEASE, WITHOUT LONG-TERM CURRENT USE OF INSULIN (HCC): Primary | ICD-10-CM

## 2023-07-11 DIAGNOSIS — E11.22 CKD STAGE 2 DUE TO TYPE 2 DIABETES MELLITUS (HCC): ICD-10-CM

## 2023-07-11 DIAGNOSIS — I50.1 HEART FAILURE, LEFT, WITH LVEF 41-49% (HCC): ICD-10-CM

## 2023-07-11 LAB
ALBUMIN SERPL-MCNC: 4.4 G/DL (ref 3.8–4.9)
ALBUMIN/CREAT UR: <4 MG/G CREAT (ref 0–29)
ALBUMIN/GLOB SERPL: 1.3 {RATIO} (ref 1.2–2.2)
ALP SERPL-CCNC: 75 IU/L (ref 44–121)
ALT SERPL-CCNC: 16 IU/L (ref 0–32)
AST SERPL-CCNC: 15 IU/L (ref 0–40)
BILIRUB SERPL-MCNC: 0.6 MG/DL (ref 0–1.2)
BUN SERPL-MCNC: 15 MG/DL (ref 6–24)
BUN/CREAT SERPL: 17 (ref 9–23)
CALCIUM SERPL-MCNC: 9.4 MG/DL (ref 8.7–10.2)
CHLORIDE SERPL-SCNC: 100 MMOL/L (ref 96–106)
CHOLEST SERPL-MCNC: 164 MG/DL (ref 100–199)
CO2 SERPL-SCNC: 25 MMOL/L (ref 20–29)
CREAT SERPL-MCNC: 0.86 MG/DL (ref 0.57–1)
CREAT UR-MCNC: 84.5 MG/DL
EGFR: 80 ML/MIN/1.73
GLOBULIN SER-MCNC: 3.5 G/DL (ref 1.5–4.5)
GLUCOSE SERPL-MCNC: 137 MG/DL (ref 70–99)
HBA1C MFR BLD: 6.7 % (ref 4.8–5.6)
HDLC SERPL-MCNC: 52 MG/DL
LDLC SERPL CALC-MCNC: 88 MG/DL (ref 0–99)
MICROALBUMIN UR-MCNC: <3 UG/ML
MICRODELETION SYND BLD/T FISH: NORMAL
POTASSIUM SERPL-SCNC: 4.2 MMOL/L (ref 3.5–5.2)
PROT SERPL-MCNC: 7.9 G/DL (ref 6–8.5)
SODIUM SERPL-SCNC: 138 MMOL/L (ref 134–144)
TRIGL SERPL-MCNC: 135 MG/DL (ref 0–149)

## 2023-07-11 PROCEDURE — 99214 OFFICE O/P EST MOD 30 MIN: CPT | Performed by: FAMILY MEDICINE

## 2023-07-11 NOTE — PROGRESS NOTES
Assessment/Plan:    No problem-specific Assessment & Plan notes found for this encounter. DM2 stable but wants to go back to [facility] and stop the ozempic  Feels it does not help her lose but aware she lacks exercise  Restart janvia 100mg/d in future possibly but wants to try a higher dose of jardiance first  Dc [id number]  F/u 3m    chf stable  Continue meds and fluid restriction  Cardiology f/u  Continue jardiance but increase 10mg to 25mg/d  Vaginitis and dehydration risks advised    ckd2 stable     Diagnoses and all orders for this visit:    Type 2 diabetes mellitus with stage 2 chronic kidney disease, without long-term current use of insulin (HCC)  -     Hemoglobin A1C; Future  -     Comprehensive metabolic panel; Future  -     Empagliflozin (Jardiance) 25 MG TABS; Take 1 tablet (25 mg total) by mouth every morning    Colon cancer screening  -     Ambulatory referral to Gastroenterology; Future    Heart failure, left, with LVEF 41-49% ([facility])    CKD stage 2 due to type 2 diabetes mellitus ([address])    BMI 45.0-49.9, adult ([address])    Morbid obesity ([address])        Return in about 3 months (around 10/11/2023) for Recheck. Subjective:      Patient ID: Yfn Henriquez is a 56 y.o. female. Chief Complaint   Patient presents with   • Follow-up     Argentina Shepard MA        HPI    Not losing much wt but some  Controlling portions  Drinks water but has fluid restriction  Feels [facility] was better for diabetes control  Not exercising due to no time    The following portions of the patient's history were reviewed and updated as appropriate: allergies, current medications, past family history, past medical history, past social history, past surgical history and problem list.    Review of Systems   Constitutional: Negative for chills, fever and unexpected weight change.          Current Outpatient Medications   Medication Sig Dispense Refill   • atorvastatin (LIPITOR) 10 mg tablet TAKE 1 TABLET DAILY 90 tablet 1   • carvedilol (COREG) 6.25 mg tablet TAKE 1 TABLET TWICE A DAY WITH MEALS 180 tablet 3   • Empagliflozin (Jardiance) 25 MG TABS Take 1 tablet (25 mg total) by mouth every morning 90 tablet 1   • Entresto 24-26 MG TABS AT THE START OF THERAPY TAKE 1 TABLET DAILY FOR 1 WEEK THEN INCREASE TO 1 TABLET TWICE A DAY THEREAFTER 180 tablet 3   • fluconazole (DIFLUCAN) 150 mg tablet      • spironolactone (ALDACTONE) 25 mg tablet TAKE 1 TABLET DAILY 90 tablet 3   • torsemide (DEMADEX) 10 mg tablet Once a day on Mon-Friday- Sunday as needed 45 tablet 1   • valACYclovir (VALTREX) 1,000 mg tablet Take 1 tablet (1,000 mg total) by mouth 2 (two) times a day for 5 days 10 tablet 0   • valACYclovir (VALTREX) 1,000 mg tablet Take 1 tablet (1,000 mg total) by mouth 2 (two) times a day for 10 days (Patient not taking: Reported on 7/11/2023) 20 tablet 0     No current facility-administered medications for this visit. Objective:    /64   Pulse 77   Temp 98 °F (36.7 °C)   Resp 16   Ht 5' 8" (1.727 m)   Wt (!) 139 kg (307 lb)   SpO2 95%   BMI 46.68 kg/m²        Physical Exam  Vitals and nursing note reviewed. Constitutional:       General: She is not in acute distress. Appearance: She is well-developed. She is obese. She is not ill-appearing. HENT:      Head: Normocephalic. Right Ear: Tympanic membrane normal.      Left Ear: Tympanic membrane normal.   Eyes:      General: No scleral icterus. Conjunctiva/sclera: Conjunctivae normal.   Cardiovascular:      Rate and Rhythm: Normal rate and regular rhythm. Heart sounds: No murmur heard. Pulmonary:      Effort: Pulmonary effort is normal. No respiratory distress. Breath sounds: No wheezing. Abdominal:      Palpations: Abdomen is soft. Tenderness: There is no abdominal tenderness. Musculoskeletal:         General: No deformity. Cervical back: Neck supple. Right lower leg: No edema. Left lower leg: No edema.    Skin:     General: Skin is warm and dry. Coloration: Skin is not pale. Neurological:      Mental Status: She is alert. Motor: No weakness. Gait: Gait normal.   Psychiatric:         Behavior: Behavior normal.         Thought Content: Thought content normal.       BMI Counseling: Body mass index is 46.68 kg/m². The BMI is above normal. Nutrition recommendations include decreasing portion sizes and moderation in carbohydrate intake. Exercise recommendations include exercising 3-5 times per week. No pharmacotherapy was ordered. Rationale for BMI follow-up plan is due to patient being overweight or obese.               Brittani Rain DO

## 2023-07-11 NOTE — PATIENT INSTRUCTIONS
SHINGRIX is the new, more effective vaccine for Shingles. It is more than 90% effective. You should check with your local pharmacy and insurance company for availability and coverage. It is a 2 shot series, with the second shot given between 2-6 months after the first, and is approved for ages 48 and up.

## 2023-07-12 PROBLEM — E11.8 TYPE 2 DIABETES MELLITUS WITH COMPLICATION, WITHOUT LONG-TERM CURRENT USE OF INSULIN (HCC): Status: ACTIVE | Noted: 2023-07-12

## 2023-07-13 RX ORDER — EMPAGLIFLOZIN 10 MG/1
TABLET, FILM COATED ORAL
Qty: 90 TABLET | Refills: 1 | OUTPATIENT
Start: 2023-07-13

## 2023-09-18 ENCOUNTER — TELEPHONE (OUTPATIENT)
Dept: OBGYN CLINIC | Facility: CLINIC | Age: 58
End: 2023-09-18

## 2023-09-18 DIAGNOSIS — Z20.2 EXPOSURE TO STD: Primary | ICD-10-CM

## 2023-09-18 NOTE — TELEPHONE ENCOUNTER
Mycoplasma can be tested off a urine sample if she prefers. She would need to go to a [facility] laboratory to complete since they are the ones that do the testing. Urine is not as sensitive as a genital swab for picking up the bacteria.

## 2023-09-18 NOTE — TELEPHONE ENCOUNTER
Patient called, left message on answering machine, she was informed by a sexual partner he has tested positive for Mycoplasma genitalium. Patient stated she usually shows signs of yeast infection as she is a diabetic. Patient would like to know if she has to come in for an exam or is she can have labs ordered?

## 2023-09-19 ENCOUNTER — APPOINTMENT (OUTPATIENT)
Dept: LAB | Facility: HOSPITAL | Age: 58
End: 2023-09-19
Payer: COMMERCIAL

## 2023-09-19 DIAGNOSIS — Z20.2 EXPOSURE TO STD: ICD-10-CM

## 2023-09-19 PROCEDURE — 87563 M. GENITALIUM AMP PROBE: CPT

## 2023-09-19 PROCEDURE — 87661 TRICHOMONAS VAGINALIS AMPLIF: CPT

## 2023-09-21 DIAGNOSIS — A49.3 MYCOPLASMA INFECTION: Primary | ICD-10-CM

## 2023-09-21 LAB
M GENITALIUM DNA SPEC QL NAA+PROBE: POSITIVE
T VAGINALIS DNA SPEC QL NAA+PROBE: NEGATIVE

## 2023-09-21 RX ORDER — DOXYCYCLINE 100 MG/1
100 TABLET ORAL 2 TIMES DAILY
Qty: 14 TABLET | Refills: 0 | Status: SHIPPED | OUTPATIENT
Start: 2023-09-21 | End: 2023-09-28

## 2023-09-21 RX ORDER — MOXIFLOXACIN HYDROCHLORIDE 400 MG/1
400 TABLET ORAL DAILY
Qty: 7 TABLET | Refills: 0 | Status: SHIPPED | OUTPATIENT
Start: 2023-09-21 | End: 2023-09-28

## 2023-09-21 NOTE — TELEPHONE ENCOUNTER
Placed call to patient, reviewed results and instructions. Patient verbalized understanding and had no additional questions.

## 2023-09-21 NOTE — TELEPHONE ENCOUNTER
Patient left message on machine - went Tuesday morning for urine sample. Did not see results in [facility], wondering if received completed results yet.

## 2023-09-21 NOTE — TELEPHONE ENCOUNTER
Results just came in- please call to review that she is + for mycoplasma. This is a sexually transmitted infection. It can be treated with antibiotics. She will need to take Doxycycline 100mg, two times a day, for 7 days; followed by moxiflocaxin 400mg once daily for 7 days. I will send to her pharmacy. Her partner requires treatment and they should abstain from all sexual activity until they BOTH have completed treatment plus an additional 7 days.

## 2023-10-01 NOTE — ADDENDUM NOTE
Addended by: Sascha Juarez on: 6/10/2021 10:41 AM     Modules accepted: Orders Attempted to call report     Maday Ramirez RN  10/01/23 [id number]

## 2023-10-18 DIAGNOSIS — E78.49 OTHER HYPERLIPIDEMIA: ICD-10-CM

## 2023-10-18 RX ORDER — ATORVASTATIN CALCIUM 10 MG/1
TABLET, FILM COATED ORAL
Qty: 90 TABLET | Refills: 1 | Status: SHIPPED | OUTPATIENT
Start: 2023-10-18

## 2023-10-23 ENCOUNTER — RA CDI HCC (OUTPATIENT)
Dept: OTHER | Facility: HOSPITAL | Age: 58
End: 2023-10-23

## 2023-10-23 NOTE — PROGRESS NOTES
[facility] coding opportunities       Chart reviewed, no opportunity found: CHART REVIEWED, NO OPPORTUNITY FOUND        Patients Insurance        Commercial Insurance: [employer]wood

## 2023-11-28 ENCOUNTER — OFFICE VISIT (OUTPATIENT)
Dept: FAMILY MEDICINE CLINIC | Facility: CLINIC | Age: 58
End: 2023-11-28
Payer: COMMERCIAL

## 2023-11-28 VITALS
TEMPERATURE: 97.8 F | SYSTOLIC BLOOD PRESSURE: 112 MMHG | HEART RATE: 75 BPM | DIASTOLIC BLOOD PRESSURE: 74 MMHG | HEIGHT: 68 IN | OXYGEN SATURATION: 97 % | WEIGHT: 293 LBS | RESPIRATION RATE: 16 BRPM | BODY MASS INDEX: 44.41 KG/M2

## 2023-11-28 DIAGNOSIS — Z11.1 SCREENING-PULMONARY TB: ICD-10-CM

## 2023-11-28 DIAGNOSIS — Z12.31 BREAST CANCER SCREENING BY MAMMOGRAM: ICD-10-CM

## 2023-11-28 DIAGNOSIS — E11.22 CKD STAGE 1 DUE TO TYPE 2 DIABETES MELLITUS: ICD-10-CM

## 2023-11-28 DIAGNOSIS — N18.1 TYPE 2 DIABETES MELLITUS WITH STAGE 1 CHRONIC KIDNEY DISEASE, WITHOUT LONG-TERM CURRENT USE OF INSULIN: ICD-10-CM

## 2023-11-28 DIAGNOSIS — Z12.11 COLON CANCER SCREENING: ICD-10-CM

## 2023-11-28 DIAGNOSIS — N18.1 CKD STAGE 1 DUE TO TYPE 2 DIABETES MELLITUS: ICD-10-CM

## 2023-11-28 DIAGNOSIS — Z00.00 HEALTHCARE MAINTENANCE: Primary | ICD-10-CM

## 2023-11-28 DIAGNOSIS — E11.22 TYPE 2 DIABETES MELLITUS WITH STAGE 1 CHRONIC KIDNEY DISEASE, WITHOUT LONG-TERM CURRENT USE OF INSULIN: ICD-10-CM

## 2023-11-28 DIAGNOSIS — E66.01 MORBID OBESITY (HCC): ICD-10-CM

## 2023-11-28 LAB
ALBUMIN SERPL-MCNC: 4.6 G/DL (ref 3.8–4.9)
ALBUMIN/GLOB SERPL: 1.3 {RATIO} (ref 1.2–2.2)
ALP SERPL-CCNC: 82 IU/L (ref 44–121)
ALT SERPL-CCNC: 16 IU/L (ref 0–32)
AST SERPL-CCNC: 17 IU/L (ref 0–40)
BILIRUB SERPL-MCNC: 0.9 MG/DL (ref 0–1.2)
BUN SERPL-MCNC: 16 MG/DL (ref 6–24)
BUN/CREAT SERPL: 22 (ref 9–23)
CALCIUM SERPL-MCNC: 9.5 MG/DL (ref 8.7–10.2)
CHLORIDE SERPL-SCNC: 99 MMOL/L (ref 96–106)
CO2 SERPL-SCNC: 21 MMOL/L (ref 20–29)
CREAT SERPL-MCNC: 0.73 MG/DL (ref 0.57–1)
EGFR: 96 ML/MIN/1.73
GLOBULIN SER-MCNC: 3.5 G/DL (ref 1.5–4.5)
GLUCOSE SERPL-MCNC: 131 MG/DL (ref 70–99)
HBA1C MFR BLD: 7.3 % (ref 4.8–5.6)
POTASSIUM SERPL-SCNC: 4.3 MMOL/L (ref 3.5–5.2)
PROT SERPL-MCNC: 8.1 G/DL (ref 6–8.5)
SODIUM SERPL-SCNC: 138 MMOL/L (ref 134–144)

## 2023-11-28 PROCEDURE — 99396 PREV VISIT EST AGE 40-64: CPT | Performed by: FAMILY MEDICINE

## 2023-11-28 PROCEDURE — 86580 TB INTRADERMAL TEST: CPT

## 2023-11-28 NOTE — LETTER
November 28, 2023     Patient: Adriana Gonzalez  YOB: 1965  Date of Visit: 11/28/2023      To Whom it May Concern:    Adriana Gonzalez is under my professional care. Analilia was seen in my office on 11/28/2023. She had a preventative visit today. She is in good health. If you have any questions or concerns, please don't hesitate to call.          Sincerely,          Pooja Cano, DO        CC: No Recipients

## 2023-11-28 NOTE — PROGRESS NOTES
Assessment/Plan:    No problem-specific Assessment & Plan notes found for this encounter. DM2 near goal  Has been mostly on jardiance 10mg/d  Advised to take 20mg/d until 10mg supply done and then 25mg/d  Diet/exercise  A1c and f/u 3m    Requests CPE today for work statement    PPD placed, read in 2d    HLD stable     Diagnoses and all orders for this visit:    Healthcare maintenance    Breast cancer screening by mammogram  -     Mammo screening bilateral w 3d & cad; Future    Colon cancer screening  -     Ambulatory referral to Gastroenterology; Future    CKD stage 1 due to type 2 diabetes mellitus     Type 2 diabetes mellitus with stage 1 chronic kidney disease, without long-term current use of insulin   -     Comprehensive metabolic panel; Future  -     Hemoglobin A1C; Future    Morbid obesity (HCC)    Screening-pulmonary TB  -     TB Skin Test        Cpe    Yearly eye exam advised    Return in about 3 months (around 2/28/2024) for Recheck 2d for PPD check. Subjective:      Patient ID: Porter Siddiqui is a 66 y.o. female. Chief Complaint   Patient presents with    Follow-up     Stephen Mei MA     Diabetes       HPI  Requests cpe today  A1c 7.3  Slight wt gain  Still on jardiance 10mg/d instead of 25mg/d  Started 25mg a few wks  Could do better with diet  Stress per pt with ex  and financials and   Exercising less    The following portions of the patient's history were reviewed and updated as appropriate: allergies, current medications, past family history, past medical history, past social history, past surgical history and problem list.    Review of Systems   Constitutional:  Negative for chills, fever and unexpected weight change.          Current Outpatient Medications   Medication Sig Dispense Refill    atorvastatin (LIPITOR) 10 mg tablet TAKE 1 TABLET DAILY 90 tablet 1    carvedilol (COREG) 6.25 mg tablet TAKE 1 TABLET TWICE A DAY WITH MEALS 180 tablet 3    Empagliflozin (Jardiance) 25 MG TABS Take 1 tablet (25 mg total) by mouth every morning 90 tablet 1    Entresto 24-26 MG TABS AT THE START OF THERAPY TAKE 1 TABLET DAILY FOR 1 WEEK THEN INCREASE TO 1 TABLET TWICE A DAY THEREAFTER 180 tablet 3    fluconazole (DIFLUCAN) 150 mg tablet       spironolactone (ALDACTONE) 25 mg tablet TAKE 1 TABLET DAILY 90 tablet 3    torsemide (DEMADEX) 10 mg tablet Once a day on Mon-Friday- Sunday as needed 45 tablet 1    valACYclovir (VALTREX) 1,000 mg tablet Take 1 tablet (1,000 mg total) by mouth 2 (two) times a day for 10 days (Patient not taking: Reported on 7/11/2023) 20 tablet 0    valACYclovir (VALTREX) 1,000 mg tablet Take 1 tablet (1,000 mg total) by mouth 2 (two) times a day for 5 days (Patient not taking: Reported on 11/28/2023) 10 tablet 0     No current facility-administered medications for this visit. Objective:    /74   Pulse 75   Temp 97.8 °F (36.6 °C)   Resp 16   Ht 5' 8" (1.727 m)   Wt (!) 141 kg (310 lb)   SpO2 97%   BMI 47.14 kg/m²        Physical Exam  Vitals and nursing note reviewed. Constitutional:       Appearance: She is well-developed. She is obese. She is not ill-appearing. HENT:      Head: Normocephalic. Right Ear: Tympanic membrane normal.      Left Ear: Tympanic membrane normal.   Eyes:      General: No scleral icterus. Conjunctiva/sclera: Conjunctivae normal.   Cardiovascular:      Rate and Rhythm: Normal rate and regular rhythm. Heart sounds: No murmur heard. Pulmonary:      Effort: Pulmonary effort is normal. No respiratory distress. Breath sounds: No wheezing. Abdominal:      Palpations: Abdomen is soft. Musculoskeletal:         General: No deformity. Cervical back: Neck supple. Skin:     General: Skin is warm and dry. Coloration: Skin is not pale. Neurological:      Mental Status: She is alert. Motor: No weakness. Gait: Gait normal.   Psychiatric:         Mood and Affect: Mood is anxious. Behavior: Behavior normal.         Thought Content:  Thought content normal.                Elvia Bennett, DO

## 2023-12-01 ENCOUNTER — CLINICAL SUPPORT (OUTPATIENT)
Dept: FAMILY MEDICINE CLINIC | Facility: CLINIC | Age: 58
End: 2023-12-01

## 2023-12-01 DIAGNOSIS — Z11.1 SCREENING-PULMONARY TB: Primary | ICD-10-CM

## 2023-12-01 LAB
INDURATION: 0 MM
TB SKIN TEST: NEGATIVE

## 2023-12-11 ENCOUNTER — TELEPHONE (OUTPATIENT)
Dept: OBGYN CLINIC | Facility: CLINIC | Age: 58
End: 2023-12-11

## 2023-12-11 NOTE — TELEPHONE ENCOUNTER
Patient left message on machine - went to the hospital awhile ago and had a diagnosis of herpes. Thinks having an outbreak now. Painful sores like had back then. Was given a cream. Would like to talk to the doctor, person believes got it from is on a daily medication. Has never discussed that or follow up about that.

## 2023-12-12 DIAGNOSIS — B00.9 HERPES: ICD-10-CM

## 2023-12-12 DIAGNOSIS — B37.9 YEAST INFECTION: Primary | ICD-10-CM

## 2023-12-12 RX ORDER — VALACYCLOVIR HYDROCHLORIDE 500 MG/1
500 TABLET, FILM COATED ORAL 2 TIMES DAILY
Qty: 10 TABLET | Refills: 0 | Status: SHIPPED | OUTPATIENT
Start: 2023-12-12 | End: 2023-12-17

## 2023-12-12 RX ORDER — VALACYCLOVIR HYDROCHLORIDE 500 MG/1
500 TABLET, FILM COATED ORAL DAILY
Qty: 30 TABLET | Refills: 1 | Status: SHIPPED | OUTPATIENT
Start: 2023-12-12 | End: 2024-02-10

## 2023-12-12 RX ORDER — FLUCONAZOLE 150 MG/1
150 TABLET ORAL WEEKLY
Qty: 30 TABLET | Refills: 0 | Status: SHIPPED | OUTPATIENT
Start: 2023-12-12 | End: 2024-07-03

## 2023-12-12 RX ORDER — LIDOCAINE AND PRILOCAINE 25; 25 MG/G; MG/G
CREAM TOPICAL AS NEEDED
Qty: 5 G | Refills: 0 | Status: SHIPPED | OUTPATIENT
Start: 2023-12-12 | End: 2023-12-19

## 2023-12-12 NOTE — TELEPHONE ENCOUNTER
Spoke to on call provider who said it was okay to put orders in for medication for her herpes outbreak and also a lidocaine cream. On call provider would like patient to be seen in office also. Patient scheduled for Friday. Patient also wanted to continue her medication for diflucan that she takes once a week. Orders sent to provider to sign off on.

## 2023-12-15 ENCOUNTER — OFFICE VISIT (OUTPATIENT)
Dept: OBGYN CLINIC | Facility: CLINIC | Age: 58
End: 2023-12-15
Payer: COMMERCIAL

## 2023-12-15 VITALS
DIASTOLIC BLOOD PRESSURE: 72 MMHG | BODY MASS INDEX: 44.41 KG/M2 | SYSTOLIC BLOOD PRESSURE: 110 MMHG | HEIGHT: 68 IN | WEIGHT: 293 LBS

## 2023-12-15 DIAGNOSIS — L29.2 VULVAR ITCHING: ICD-10-CM

## 2023-12-15 DIAGNOSIS — N95.1 PERIMENOPAUSE: ICD-10-CM

## 2023-12-15 DIAGNOSIS — N76.6 VULVAR ULCERATION: Primary | ICD-10-CM

## 2023-12-15 DIAGNOSIS — Z11.3 SCREENING EXAMINATION FOR STD (SEXUALLY TRANSMITTED DISEASE): ICD-10-CM

## 2023-12-15 DIAGNOSIS — N89.8 VAGINAL DISCHARGE: ICD-10-CM

## 2023-12-15 PROCEDURE — 87563 M. GENITALIUM AMP PROBE: CPT | Performed by: PHYSICIAN ASSISTANT

## 2023-12-15 PROCEDURE — 87106 FUNGI IDENTIFICATION YEAST: CPT | Performed by: PHYSICIAN ASSISTANT

## 2023-12-15 PROCEDURE — 87529 HSV DNA AMP PROBE: CPT | Performed by: PHYSICIAN ASSISTANT

## 2023-12-15 PROCEDURE — 99214 OFFICE O/P EST MOD 30 MIN: CPT | Performed by: PHYSICIAN ASSISTANT

## 2023-12-15 PROCEDURE — 87491 CHLMYD TRACH DNA AMP PROBE: CPT | Performed by: PHYSICIAN ASSISTANT

## 2023-12-15 PROCEDURE — 87661 TRICHOMONAS VAGINALIS AMPLIF: CPT | Performed by: PHYSICIAN ASSISTANT

## 2023-12-15 PROCEDURE — 87070 CULTURE OTHR SPECIMN AEROBIC: CPT | Performed by: PHYSICIAN ASSISTANT

## 2023-12-15 PROCEDURE — 87591 N.GONORRHOEAE DNA AMP PROB: CPT | Performed by: PHYSICIAN ASSISTANT

## 2023-12-15 RX ORDER — ESTRADIOL 0.1 MG/G
CREAM VAGINAL
Qty: 42.5 G | Refills: 1 | Status: SHIPPED | OUTPATIENT
Start: 2023-12-15

## 2023-12-15 NOTE — PROGRESS NOTES
Assessment/Plan:    No problem-specific Assessment & Plan notes found for this encounter. Problem List Items Addressed This Visit    None  Visit Diagnoses       Vulvar ulceration    -  Primary    Vulvar itching        Relevant Medications    estradiol (ESTRACE VAGINAL) 0.1 mg/g vaginal cream    Perimenopause        Relevant Medications    estradiol (ESTRACE VAGINAL) 0.1 mg/g vaginal cream            All STD cx screenings done. Will plan addition of coconut oil as well as vaginal estrogen. Aware will tx otherwise based on culture results. Educated on use of Valtrex when she is having an outbreak. Aware that I did culture her today for HSV. I am not convinced that current vulvar ulcerations are HSV related. If HSV cx negative will add hypdocortisone cream BID to lessen inflammation on vulva as well. If cx's negative and sx persist, I would consider vulvar biopsy to r/o LSA. Subjective:      Patient ID: Ramon Saba is a 66 y.o. female. Pt presents today with vulvar/vaginal complaints. Pt was previously d/w HSV. Notes sx she previously experienced were much more painful than what she is experiencing today. She notes intense itching. Will often have resolve with a cool compress. Pt does also have a h/o recurrent yeast and takes Diflucan weekly although she has not taken it in some time. She notes that she took her [address] 2 days ago but still having sx. She recently was changed DM meds per her PCP and is urinating more. Wonders if it caused candidal infection. Pt notes that her sx are worse in the PM. Has h/o mycoplasma that she is unsure if it resolved completely. Pt notes multiple sexual partners, that she does not always use protection with. Started Valtrex 2 days ago but was not taking it twice per day. We reviewed tx vs suppression. She feels her sx are not any better, but not any worse after using the Diflucan. Pt notes LMP was approx 10 mths ago.            The following portions of the patient's history were reviewed and updated as appropriate: She  has a past medical history of Achilles tendinitis, unspecified leg (12/14/2006), Anxiety, Arthritis, Asthma, Bilateral leg edema, BMI 50.0-59.9, adult ([address]), Breast lump (07/11/2008), Chest pain on breathing, Chronic kidney disease, stage 1, Clotting disorder ([address]), Diabetes mellitus ([address]), Eczema, Environmental allergies, Exposure to potentially hazardous body fluids, Heart failure, left, with LVEF 41-49% ([address]), Hypertension (05/30/2012), Infectious mononucleosis, Lateral epicondylitis (02/17/2011), LBBB (left bundle branch block), LBBB (left bundle branch block) (2/8/2018), Lipoma, 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), Psychiatric disorder, Sialodochitis (06/01/2010), Situational anxiety, Sleep apnea, Tongue disorder, and Type 2 diabetes mellitus with kidney complication, without long-term current use of insulin ([address]).   She   Patient Active Problem List    Diagnosis Date Noted    Type 2 diabetes mellitus with complication, without long-term current use of insulin ([address]) 07/12/2023    Breast cancer screening by mammogram 10/18/2022    BMI 45.0-49.9, adult ([address]) 05/24/2022    Adhesive capsulitis of left shoulder 03/23/2022    Rotator cuff tendinitis, left 03/23/2022    Biceps tendinitis on left 03/23/2022    Vaginal yeast infection 01/10/2022    Strain of long head of left biceps 10/15/2021    Bilateral thigh pain 10/15/2021    Myalgia 08/10/2021    Word finding difficulty 04/13/2021    Memory difficulties 04/13/2021    Healthcare maintenance 10/01/2020    Routine gynecological examination 08/06/2020    Immunization due 03/02/2020    Rash 12/11/2019    Other hyperlipidemia 08/19/2019    Colon cancer screening 11/02/2018    Gynecologic exam normal 05/27/2018    Type 2 diabetes mellitus with stage 1 chronic kidney disease, without long-term current use of insulin  05/14/2018    Allergic rhinitis due to allergen 05/14/2018    Screening for cardiovascular, respiratory, and genitourinary diseases 04/23/2018    Bilateral leg edema 02/08/2018    LBBB (left bundle branch block) 02/08/2018    Low back pain 01/10/2018    CKD stage 1 due to type 2 diabetes mellitus  11/06/2016    Heart failure, left, with LVEF 41-49% ([address]) 08/10/2016    ARMANDO (obstructive sleep apnea) 07/22/2016    Morbid obesity ([address]) 07/22/2016    Chronic eczema 06/09/2016    Degenerative arthritis of thoracic spine 02/13/2015    Hypertensive heart and kidney disease with HF and with CKD stage I-IV ([address]) 09/04/2012    Generalized anxiety disorder 09/04/2012     She  has a past surgical history that includes Cholecystectomy; Colonoscopy (1997); Gallbladder surgery (1996); Essure tubal ligation (2010); Cervical biopsy w/ loop electrode excision (1992); and Gynecologic cryosurgery (1992). Her family history includes Arthritis in her family, father, and sister; Atrial fibrillation in her father; Bone cancer in her maternal grandfather; Brain cancer in her paternal grandfather; Breast cancer (age of onset: 61) in her maternal grandmother; Diabetes type II in her mother; Heart attack in her mother; Heart disease in her father and maternal grandmother; No Known Problems in her maternal aunt; Other in her other. She  reports that she has never smoked. She has never used smokeless tobacco. She reports current drug use. Drug: Marijuana. She reports that she does not drink alcohol.   Current Outpatient Medications   Medication Sig Dispense Refill    atorvastatin (LIPITOR) 10 mg tablet TAKE 1 TABLET DAILY 90 tablet 1    carvedilol (COREG) 6.25 mg tablet TAKE 1 TABLET TWICE A DAY WITH MEALS 180 tablet 3    Empagliflozin (Jardiance) 25 MG TABS Take 1 tablet (25 mg total) by mouth every morning 90 tablet 1    Entresto 24-26 MG TABS AT THE START OF THERAPY TAKE 1 TABLET DAILY FOR 1 WEEK THEN INCREASE TO 1 TABLET TWICE A DAY THEREAFTER 180 tablet 3    estradiol (ESTRACE VAGINAL) 0.1 mg/g vaginal cream Use a pea sized amount twice weekly 42.5 g 1    spironolactone (ALDACTONE) 25 mg tablet TAKE 1 TABLET DAILY 90 tablet 3    torsemide (DEMADEX) 10 mg tablet Once a day on Mon-Friday- Sunday as needed 45 tablet 1    valACYclovir (VALTREX) 500 mg tablet Take 1 tablet (500 mg total) by mouth daily Take 1 tablet by mouth daily for suppression. Start after you finish the 5 day treatment. 30 tablet 1    fluconazole (DIFLUCAN) 150 mg tablet  (Patient not taking: Reported on 12/15/2023)      fluconazole (DIFLUCAN) 150 mg tablet Take 1 tablet (150 mg total) by mouth once a week for 30 doses Take 1 tablet  ONCE A WEEK for 6 months (Patient not taking: Reported on 12/15/2023) 30 tablet 0    lidocaine-prilocaine (EMLA) cream Apply topically as needed for mild pain for up to 7 days : Apply topically as needed for mild pain for up to 7 days Apply 2.5 g over skin area (Patient not taking: Reported on 12/15/2023) 5 g 0    valACYclovir (VALTREX) 1,000 mg tablet Take 1 tablet (1,000 mg total) by mouth 2 (two) times a day for 10 days (Patient not taking: Reported on 7/11/2023) 20 tablet 0    valACYclovir (VALTREX) 1,000 mg tablet Take 1 tablet (1,000 mg total) by mouth 2 (two) times a day for 5 days (Patient not taking: Reported on 12/15/2023) 10 tablet 0    valACYclovir (VALTREX) 500 mg tablet Take 1 tablet (500 mg total) by mouth 2 (two) times a day for 5 days (Patient not taking: Reported on 12/15/2023) 10 tablet 0     No current facility-administered medications for this visit.      Current Outpatient Medications on File Prior to Visit   Medication Sig    atorvastatin (LIPITOR) 10 mg tablet TAKE 1 TABLET DAILY    carvedilol (COREG) 6.25 mg tablet TAKE 1 TABLET TWICE A DAY WITH MEALS    Empagliflozin (Jardiance) 25 MG TABS Take 1 tablet (25 mg total) by mouth every morning    Entresto 24-26 MG TABS AT THE START OF THERAPY TAKE 1 TABLET DAILY FOR 1 WEEK THEN INCREASE TO 1 TABLET TWICE A DAY THEREAFTER    spironolactone (ALDACTONE) 25 mg tablet TAKE 1 TABLET DAILY    torsemide (DEMADEX) 10 mg tablet Once a day on Mon-Friday- Sunday as needed    valACYclovir (VALTREX) 500 mg tablet Take 1 tablet (500 mg total) by mouth daily Take 1 tablet by mouth daily for suppression. Start after you finish the 5 day treatment. fluconazole (DIFLUCAN) 150 mg tablet  (Patient not taking: Reported on 12/15/2023)    fluconazole (DIFLUCAN) 150 mg tablet Take 1 tablet (150 mg total) by mouth once a week for 30 doses Take 1 tablet  ONCE A WEEK for 6 months (Patient not taking: Reported on 12/15/2023)    lidocaine-prilocaine (EMLA) cream Apply topically as needed for mild pain for up to 7 days : Apply topically as needed for mild pain for up to 7 days Apply 2.5 g over skin area (Patient not taking: Reported on 12/15/2023)    valACYclovir (VALTREX) 1,000 mg tablet Take 1 tablet (1,000 mg total) by mouth 2 (two) times a day for 10 days (Patient not taking: Reported on 7/11/2023)    valACYclovir (VALTREX) 1,000 mg tablet Take 1 tablet (1,000 mg total) by mouth 2 (two) times a day for 5 days (Patient not taking: Reported on 12/15/2023)    valACYclovir (VALTREX) 500 mg tablet Take 1 tablet (500 mg total) by mouth 2 (two) times a day for 5 days (Patient not taking: Reported on 12/15/2023)     No current facility-administered medications on file prior to visit. She has No Known Allergies. .    Review of Systems      Objective:      /72 (BP Location: Left arm, Patient Position: Sitting, Cuff Size: Large)   Ht 5' 8" (1.727 m)   Wt (!) 144 kg (318 lb)   LMP 02/15/2023 (Approximate)   BMI 48.35 kg/m²          Physical Exam  Constitutional:       Appearance: She is obese. HENT:      Head: Normocephalic and atraumatic. Cardiovascular:      Rate and Rhythm: Normal rate and regular rhythm. Pulmonary:      Effort: Pulmonary effort is normal.      Breath sounds: Normal breath sounds. Abdominal:      General: Abdomen is flat.  Bowel sounds are normal.      Palpations: Abdomen is soft. Genitourinary:     Labia:         Right: Rash, tenderness and injury present. Left: Rash, tenderness, lesion and injury present. Urethra: No prolapse, urethral pain, urethral swelling or urethral lesion. Vagina: Vaginal discharge present. Cervix: Normal.      Uterus: Normal.           Comments: External vulva with multiple excoriations present. With erythema and inflammation present throughout and around anus, although there is a hypopigmented area present around the anus that could be c/w possible LSA. Pt with 2 shallow slit like lesions present as marked above. Both at junction of two areas, shallow and slit like as opposed to vesicular. Thin white d.c present in vaginal vault. Skin:     General: Skin is warm and dry. Neurological:      Mental Status: She is alert. Psychiatric:         Mood and Affect: Mood normal.         Behavior: Behavior normal.         Thought Content:  Thought content normal.         Judgment: Judgment normal.

## 2023-12-17 LAB
BACTERIA GENITAL AEROBE CULT: NORMAL
C TRACH DNA SPEC QL NAA+PROBE: NEGATIVE
N GONORRHOEA DNA SPEC QL NAA+PROBE: NEGATIVE

## 2023-12-18 LAB
M GENITALIUM DNA SPEC QL NAA+PROBE: NEGATIVE
T VAGINALIS DNA SPEC QL NAA+PROBE: NEGATIVE

## 2023-12-19 DIAGNOSIS — B37.9 CANDIDA GLABRATA INFECTION: Primary | ICD-10-CM

## 2023-12-19 LAB
BACTERIA GENITAL AEROBE CULT: ABNORMAL
BACTERIA GENITAL AEROBE CULT: ABNORMAL

## 2023-12-20 DIAGNOSIS — E11.22 TYPE 2 DIABETES MELLITUS WITH STAGE 2 CHRONIC KIDNEY DISEASE, WITHOUT LONG-TERM CURRENT USE OF INSULIN: ICD-10-CM

## 2023-12-20 DIAGNOSIS — N18.2 TYPE 2 DIABETES MELLITUS WITH STAGE 2 CHRONIC KIDNEY DISEASE, WITHOUT LONG-TERM CURRENT USE OF INSULIN: ICD-10-CM

## 2023-12-20 LAB
HSV1 DNA SPEC QL NAA+PROBE: NEGATIVE
HSV2 DNA SPEC QL NAA+PROBE: NEGATIVE

## 2023-12-20 RX ORDER — EMPAGLIFLOZIN 25 MG/1
25 TABLET, FILM COATED ORAL EVERY MORNING
Qty: 90 TABLET | Refills: 1 | Status: SHIPPED | OUTPATIENT
Start: 2023-12-20

## 2023-12-26 DIAGNOSIS — E78.49 OTHER HYPERLIPIDEMIA: ICD-10-CM

## 2023-12-26 DIAGNOSIS — I44.7 LBBB (LEFT BUNDLE BRANCH BLOCK): ICD-10-CM

## 2023-12-27 RX ORDER — SACUBITRIL AND VALSARTAN 24; 26 MG/1; MG/1
TABLET, FILM COATED ORAL
Qty: 180 TABLET | Refills: 0 | Status: SHIPPED | OUTPATIENT
Start: 2023-12-27

## 2024-01-08 ENCOUNTER — TELEPHONE (OUTPATIENT)
Dept: CARDIOLOGY CLINIC | Facility: CLINIC | Age: 59
End: 2024-01-08

## 2024-01-08 NOTE — TELEPHONE ENCOUNTER
December 29 moved into a new place.  Pins and needs in both arms.  This has been a little bit over a week.  Denies chest pain and shortness of breath.  Patient is feeling fatigued and stressed since move.  Patient would like to know what she should do.  [phone].

## 2024-01-08 NOTE — TELEPHONE ENCOUNTER
If she has severe pain with exertion she should go to ER. IF not, arm pain on both sides could be for many reasons- she can be evaluated by primary team

## 2024-01-09 NOTE — TELEPHONE ENCOUNTER
Called and spoke with patient.  Patient does not have any pain with exertion.  She will be contacting Dr. Ramirez's office to see what they can do for her.

## 2024-01-15 NOTE — TELEPHONE ENCOUNTER
Patient was not put on automatic refills through [facility] and now she is going to run out of her Metformin  Can we call in a one month supply to local pharmacy?  Please advise  Deniz Daugherty MA None

## 2024-02-21 PROBLEM — Z01.419 ROUTINE GYNECOLOGICAL EXAMINATION: Status: RESOLVED | Noted: 2020-08-06 | Resolved: 2024-02-21

## 2024-02-21 PROBLEM — Z13.6 SCREENING FOR CARDIOVASCULAR, RESPIRATORY, AND GENITOURINARY DISEASES: Status: RESOLVED | Noted: 2018-04-23 | Resolved: 2024-02-21

## 2024-02-21 PROBLEM — Z01.419 GYNECOLOGIC EXAM NORMAL: Status: RESOLVED | Noted: 2018-05-27 | Resolved: 2024-02-21

## 2024-02-21 PROBLEM — Z00.00 HEALTHCARE MAINTENANCE: Status: RESOLVED | Noted: 2020-10-01 | Resolved: 2024-02-21

## 2024-02-21 PROBLEM — Z13.83 SCREENING FOR CARDIOVASCULAR, RESPIRATORY, AND GENITOURINARY DISEASES: Status: RESOLVED | Noted: 2018-04-23 | Resolved: 2024-02-21

## 2024-02-21 PROBLEM — Z12.11 COLON CANCER SCREENING: Status: RESOLVED | Noted: 2018-11-02 | Resolved: 2024-02-21

## 2024-02-21 PROBLEM — Z13.89 SCREENING FOR CARDIOVASCULAR, RESPIRATORY, AND GENITOURINARY DISEASES: Status: RESOLVED | Noted: 2018-04-23 | Resolved: 2024-02-21

## 2024-03-10 PROBLEM — S46.112A STRAIN OF LONG HEAD OF LEFT BICEPS: Status: RESOLVED | Noted: 2021-10-15 | Resolved: 2024-03-10

## 2024-03-10 PROBLEM — R21 RASH: Status: RESOLVED | Noted: 2019-12-11 | Resolved: 2024-03-10

## 2024-03-10 PROBLEM — M79.651 BILATERAL THIGH PAIN: Status: RESOLVED | Noted: 2021-10-15 | Resolved: 2024-03-10

## 2024-03-10 PROBLEM — E11.8 TYPE 2 DIABETES MELLITUS WITH COMPLICATION, WITHOUT LONG-TERM CURRENT USE OF INSULIN (HCC): Status: RESOLVED | Noted: 2023-07-12 | Resolved: 2024-03-10

## 2024-03-10 PROBLEM — M79.10 MYALGIA: Status: RESOLVED | Noted: 2021-08-10 | Resolved: 2024-03-10

## 2024-03-10 PROBLEM — M75.22 BICEPS TENDINITIS ON LEFT: Status: RESOLVED | Noted: 2022-03-23 | Resolved: 2024-03-10

## 2024-03-10 PROBLEM — J31.0 CHRONIC RHINITIS: Status: ACTIVE | Noted: 2018-05-14

## 2024-03-10 PROBLEM — B37.31 VAGINAL YEAST INFECTION: Status: RESOLVED | Noted: 2022-01-10 | Resolved: 2024-03-10

## 2024-03-10 PROBLEM — M79.652 BILATERAL THIGH PAIN: Status: RESOLVED | Noted: 2021-10-15 | Resolved: 2024-03-10

## 2024-03-14 ENCOUNTER — OFFICE VISIT (OUTPATIENT)
Dept: FAMILY MEDICINE CLINIC | Facility: CLINIC | Age: 59
End: 2024-03-14
Payer: COMMERCIAL

## 2024-03-14 VITALS
RESPIRATION RATE: 16 BRPM | SYSTOLIC BLOOD PRESSURE: 120 MMHG | TEMPERATURE: 97.6 F | BODY MASS INDEX: 48.66 KG/M2 | WEIGHT: 293 LBS | DIASTOLIC BLOOD PRESSURE: 62 MMHG | HEART RATE: 70 BPM

## 2024-03-14 DIAGNOSIS — F43.0 ANXIETY AS ACUTE REACTION TO EXCEPTIONAL STRESS: ICD-10-CM

## 2024-03-14 DIAGNOSIS — G47.33 OSA (OBSTRUCTIVE SLEEP APNEA): Primary | ICD-10-CM

## 2024-03-14 DIAGNOSIS — Z12.31 BREAST CANCER SCREENING BY MAMMOGRAM: ICD-10-CM

## 2024-03-14 DIAGNOSIS — L30.9 DERMATITIS: ICD-10-CM

## 2024-03-14 DIAGNOSIS — F41.1 ANXIETY AS ACUTE REACTION TO EXCEPTIONAL STRESS: ICD-10-CM

## 2024-03-14 DIAGNOSIS — N18.1 TYPE 2 DIABETES MELLITUS WITH STAGE 1 CHRONIC KIDNEY DISEASE, WITHOUT LONG-TERM CURRENT USE OF INSULIN: ICD-10-CM

## 2024-03-14 DIAGNOSIS — E11.22 TYPE 2 DIABETES MELLITUS WITH STAGE 1 CHRONIC KIDNEY DISEASE, WITHOUT LONG-TERM CURRENT USE OF INSULIN: ICD-10-CM

## 2024-03-14 PROCEDURE — 99214 OFFICE O/P EST MOD 30 MIN: CPT | Performed by: FAMILY MEDICINE

## 2024-03-14 RX ORDER — DESOXIMETASONE 2.5 MG/G
CREAM TOPICAL 2 TIMES DAILY
Qty: 100 G | Refills: 1 | Status: SHIPPED | OUTPATIENT
Start: 2024-03-14 | End: 2024-03-19 | Stop reason: SDUPTHER
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
## 2024-03-18 DIAGNOSIS — L30.9 DERMATITIS: ICD-10-CM

## 2024-03-18 RX ORDER — DESOXIMETASONE 2.5 MG/G
CREAM TOPICAL 2 TIMES DAILY
Qty: 100 G | Refills: 1 | Status: CANCELLED | OUTPATIENT
Start: 2024-03-18

## 2024-03-18 NOTE — TELEPHONE ENCOUNTER
Pt called to find out why script for med below hasn't been sent to [facility].

## 2024-03-19 ENCOUNTER — OFFICE VISIT (OUTPATIENT)
Dept: CARDIOLOGY CLINIC | Facility: CLINIC | Age: 59
End: 2024-03-19
Payer: COMMERCIAL

## 2024-03-19 VITALS
DIASTOLIC BLOOD PRESSURE: 62 MMHG | OXYGEN SATURATION: 98 % | SYSTOLIC BLOOD PRESSURE: 122 MMHG | WEIGHT: 293 LBS | BODY MASS INDEX: 44.41 KG/M2 | HEART RATE: 69 BPM | HEIGHT: 68 IN

## 2024-03-19 DIAGNOSIS — I44.7 LBBB (LEFT BUNDLE BRANCH BLOCK): ICD-10-CM

## 2024-03-19 DIAGNOSIS — I13.0 HYPERTENSIVE HEART AND KIDNEY DISEASE WITH HF AND WITH CKD STAGE I-IV (HCC): ICD-10-CM

## 2024-03-19 DIAGNOSIS — L30.9 DERMATITIS: ICD-10-CM

## 2024-03-19 DIAGNOSIS — I50.1 HEART FAILURE, LEFT, WITH LVEF 41-49% (HCC): Primary | ICD-10-CM

## 2024-03-19 DIAGNOSIS — E78.49 OTHER HYPERLIPIDEMIA: ICD-10-CM

## 2024-03-19 DIAGNOSIS — R60.0 BILATERAL LEG EDEMA: ICD-10-CM

## 2024-03-19 PROCEDURE — 93000 ELECTROCARDIOGRAM COMPLETE: CPT | Performed by: INTERNAL MEDICINE

## 2024-03-19 PROCEDURE — 99214 OFFICE O/P EST MOD 30 MIN: CPT | Performed by: INTERNAL MEDICINE

## 2024-03-19 RX ORDER — SACUBITRIL AND VALSARTAN 24; 26 MG/1; MG/1
1 TABLET, FILM COATED ORAL 2 TIMES DAILY
Qty: 180 TABLET | Refills: 1 | Status: SHIPPED | OUTPATIENT
Start: 2024-03-19

## 2024-03-19 RX ORDER — TRIAMCINOLONE ACETONIDE 1 MG/G
CREAM TOPICAL 2 TIMES DAILY
Qty: 80 G | Refills: 1 | Status: SHIPPED | OUTPATIENT
Start: 2024-03-19

## 2024-03-19 RX ORDER — CARVEDILOL 6.25 MG/1
6.25 TABLET ORAL 2 TIMES DAILY WITH MEALS
Qty: 180 TABLET | Refills: 3 | Status: SHIPPED | OUTPATIENT
Start: 2024-03-19

## 2024-03-19 RX ORDER — SPIRONOLACTONE 25 MG/1
25 TABLET ORAL DAILY
Qty: 90 TABLET | Refills: 3 | Status: SHIPPED | OUTPATIENT
Start: 2024-03-19

## 2024-03-19 NOTE — PROGRESS NOTES
Cardiology Follow Up  Analilia Olivo  1965  [id number]  [facility] PROFESSIONAL [facility] [place] [id number] [facility] [id number]    1. Heart failure, left, with LVEF 41-49% ([facility])  POCT ECG      2. LBBB (left bundle branch block)  POCT ECG      3. Hypertensive heart and kidney disease with HF and with CKD stage I-IV ([facility])  POCT ECG        1.Chronic systolic heart failure NYHA class 2 nonischemic EF40%- compensated on exam. Continue low salt diet + exercise. Continue carvedilol 6.25mg ++ aldactone 25mg.  She is compliant entresto. Torsemide as needed.    2. Dm2-  A1c-5.7    3. ARMANDO- weight loss. Will consider tonsillectomy    4. LBBB- old. Likely from previous dilated cm/hf. stable    5. Hld- atorvastatin    6. Weight loss/bmi46- did not have good effect with Ozempic. Possible trial Mounjaro    Rtc- six months     Discussion/Plan:  Initail visit: 51 yo pleasant woman hx morbid obesity, nonischemic systolic heart failure EF40-45%, LBBB, sleep apnea presents for follow-up. Lower extremity edema has signifcantly improved but remains. Initially improved but stopped torsemide for a couple of days. Has had diarrhea possibly secondary to metformin on lisinopril. Lisinopril held and reports continued diarrhea? Denies having chest pain. Shortness of breath improved. Just received CPAP and is about to start using it.6/23: She has not been compliant with her CPAP device secondary to severe claustrophobia. She is using her diuretic about once or twice a week. Her weight has been stable. She denies having any exertional chest heaviness. She denies having significant palpitations. She denies having any PND or orthopnea. She continues to have diarrhea with metformin usage but her sugars have been improved. She is not very active and does not have an exercise program due to lack of time. She is watching her salt intake.     2/8/18: She has not  had significant edema in her legs. Her weight has been stable. She is using medications without dizziness.  She is using the torsemide once every 11-12 days.  Slipped on ice.  Denies having shortness of breath on exertion. Not using CPAP. Pending tonsillectomy in the summer      10/06/2020:  She is compliant with her medications.  Her lower extremity edema has been stable.  She reports having increased diuresis which is affecting her quality of life.  She is starting Jardiance.  We will cut back on her torsemide.  She denies having significant chest pain.  She denies feeling dizziness or lightheadedness.      06/16/2021:  She denies having recurrence of lower extremity swelling.  She has had periodically take torsemide.  Her sugars are better controlled.  She is sedentary.  She has not been exercising.  She denies having chest heaviness.  She denies having major palpitations.     10/04/2022:  She states lower extremity swelling has been stable.  She has not had to use torsemide frequently.  She has had some yeast infections after taking Jardiance.  However they have been treated easily.  Denies any major chest pain.  She is compliant with her medications.  We discussed about the new treatments medical for weight loss.  There would be no contraindications cardiovascularly for a GLP- 1 agonist    Recent Visit: Denies having chest pain or major change in breathing. Denies significant palpitations. Compliant with therapy. Reviewed labwork together. Did not respond to ozempic.          Interval History:    Patient Active Problem List   Diagnosis    ARMANDO (obstructive sleep apnea)    Morbid obesity ([facility])    Bilateral leg edema    LBBB (left bundle branch block)    Hypertensive heart and kidney disease with HF and with CKD stage I-IV ([facility])    Chronic eczema    CKD stage 1 due to type 2 diabetes mellitus     Degenerative arthritis of thoracic spine    Generalized anxiety disorder    Heart failure, left, with LVEF 41-49% ([facility])     Low back pain    Type 2 diabetes mellitus with stage 1 chronic kidney disease, without long-term current use of insulin     Chronic rhinitis    Other hyperlipidemia    Immunization due    Word finding difficulty    Memory difficulties    Adhesive capsulitis of left shoulder    Rotator cuff tendinitis, left    BMI 45.0-49.9, adult ([facility])    Breast cancer screening by mammogram    Dermatitis    Anxiety as acute reaction to exceptional stress     Past Medical History:   Diagnosis Date    Achilles tendinitis, unspecified leg 12/14/2006    Anxiety     Arthritis     Last Assessed: 9/28/2017    Asthma     Last Assessed: 9/28/2017    Bilateral leg edema     BMI 50.0-59.9, adult ([facility])     Breast lump 07/11/2008    Chest pain on breathing     Last Assessed: 3/9/2014    Chronic kidney disease, stage 1     Last Assessed: 3/1/2014    Clotting disorder ([facility])     Diabetes mellitus ([facility])     Last Assessed: 9/28/2017 Type 2 with renal manifestations, controlled    Eczema     Environmental allergies     Last Assessed: 9/28/2017    Exposure to potentially hazardous body fluids     Last Assessed: 4/10/2015    Heart failure, left, with LVEF 41-49% ([facility])     Hypertension 05/30/2012    Benign Essential    Infectious mononucleosis     Resolved: 8/19/2015    Lateral epicondylitis 02/17/2011    unspecified laterality    LBBB (left bundle branch block)     LBBB (left bundle branch block) 2/8/2018    Lipoma     Last Assessesd: 10/13/2014    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    Psychiatric disorder     Sialodochitis 06/01/2010    Situational anxiety     Last Assessed: 10/22/2015    Sleep apnea     Tongue disorder     Last Assessed: 6/9/2016    Type 2 diabetes mellitus with kidney complication, without long-term current use of insulin ([facility])      Social History     Socioeconomic History    Marital status: Legally      Spouse name: Not on file    Number of children: 2    Years of education: Not on file    Highest education level: Not on file    Occupational History    Not on file   Tobacco Use    Smoking status: Never    Smokeless tobacco: Never    Tobacco comments:     N/a   Vaping Use    Vaping status: Never Used   Substance and Sexual Activity    Alcohol use: Never    Drug use: Yes     Types: Marijuana     Comment: medical    Sexual activity: Yes     Partners: Male     Birth control/protection: Condom Male     Comment: pt. requests std testing   Other Topics Concern    Not on file   Social History Narrative    Employed    Lack of adequate sleep    Lack of exercise    Pets: dog     Social Determinants of Health     Financial Resource Strain: Not on file   Food Insecurity: Not on file   Transportation Needs: Not on file   Physical Activity: Not on file   Stress: Not on file   Social Connections: Not on file   Intimate Partner Violence: Not on file   Housing Stability: Not on file      Family History   Problem Relation Age of Onset    Heart attack Mother     Diabetes type II Mother     Heart disease Father     Atrial fibrillation Father     Arthritis Father     Arthritis Sister     Other Other         Cardiac Disorder    Arthritis Family     Breast cancer Maternal Grandmother 60    Heart disease Maternal Grandmother     Bone cancer Maternal Grandfather     Brain cancer Paternal Grandfather     No Known Problems Maternal Aunt      Past Surgical History:   Procedure Laterality Date    CERVICAL BIOPSY  W/ LOOP ELECTRODE EXCISION  1992    CHOLECYSTECTOMY      COLONOSCOPY  1997    ESSURE TUBAL LIGATION  2010    GALLBLADDER SURGERY  1996    GYNECOLOGIC CRYOSURGERY  1992       Current Outpatient Medications:     atorvastatin (LIPITOR) 10 mg tablet, TAKE 1 TABLET DAILY, Disp: 90 tablet, Rfl: 1    carvedilol (COREG) 6.25 mg tablet, TAKE 1 TABLET TWICE A DAY WITH MEALS, Disp: 180 tablet, Rfl: 3    estradiol (ESTRACE VAGINAL) 0.1 mg/g vaginal cream, Use a pea sized amount twice weekly, Disp: 42.5 g, Rfl: 1    Jardiance 25 MG TABS, TAKE 1 TABLET EVERY MORNING, Disp:  90 tablet, Rfl: 1    sacubitril-valsartan (Entresto) 24-26 MG TABS, AT THE START OF THERAPY TAKE 1 TABLET DAILY FOR 1 WEEK THEN INCREASE TO 1 TABLET TWICE A DAY THEREAFTER, Disp: 180 tablet, Rfl: 0    sertraline (ZOLOFT) 50 mg tablet, Take 1 tablet (50 mg total) by mouth daily, Disp: 90 tablet, Rfl: 1    spironolactone (ALDACTONE) 25 mg tablet, TAKE 1 TABLET DAILY, Disp: 90 tablet, Rfl: 3    terconazole (TERAZOL 7) 0.4 % vaginal cream, Insert 1 applicator into the vagina daily at bedtime, Disp: 45 g, Rfl: 0    torsemide (DEMADEX) 10 mg tablet, Once a day on Mon-Friday- Sunday as needed, Disp: 45 tablet, Rfl: 1    triamcinolone (KENALOG) 0.1 % cream, Apply topically 2 (two) times a day Sparingly (short term) to affected area: abdomen, arms, legs, Disp: 80 g, Rfl: 1    valACYclovir (VALTREX) 500 mg tablet, Take 1 tablet (500 mg total) by mouth daily Take 1 tablet by mouth daily for suppression. Start after you finish the 5 day treatment., Disp: 30 tablet, Rfl: 1  No Known Allergies      Review of Systems:  Review of Systems   Constitutional: Negative.  Negative for activity change, appetite change, chills, diaphoresis, fatigue, fever and unexpected weight change.   HENT: Negative.  Negative for congestion, dental problem, drooling, ear discharge, ear pain, facial swelling, hearing loss, mouth sores, nosebleeds, postnasal drip, rhinorrhea, sinus pain, sinus pressure, sneezing, sore throat, tinnitus, trouble swallowing and voice change.    Eyes: Negative.  Negative for photophobia, pain, redness, itching and visual disturbance.   Respiratory: Negative.  Negative for apnea, cough, choking, chest tightness, shortness of breath, wheezing and stridor.    Cardiovascular: Negative.  Negative for chest pain, palpitations and leg swelling.   Gastrointestinal: Negative.  Negative for abdominal distention, abdominal pain, anal bleeding, blood in stool, constipation, diarrhea, nausea, rectal pain and vomiting.   Endocrine:  "Negative.  Negative for cold intolerance, heat intolerance, polydipsia, polyphagia and polyuria.   Genitourinary: Negative.  Negative for decreased urine volume, difficulty urinating, dyspareunia, dysuria, enuresis, flank pain, frequency, genital sores, hematuria, menstrual problem, pelvic pain, urgency, vaginal bleeding, vaginal discharge and vaginal pain.   Musculoskeletal: Negative.  Negative for arthralgias, back pain, gait problem, joint swelling, myalgias, neck pain and neck stiffness.   Skin: Negative.  Negative for color change, pallor, rash and wound.   Allergic/Immunologic: Negative.  Negative for environmental allergies, food allergies and immunocompromised state.   Neurological: Negative.  Negative for dizziness, tremors, seizures, syncope, facial asymmetry, speech difficulty, weakness, light-headedness, numbness and headaches.   Hematological: Negative.  Negative for adenopathy. Does not bruise/bleed easily.   Psychiatric/Behavioral: Positive for sleep disturbance. Negative for agitation, behavioral problems, confusion, decreased concentration, dysphoric mood, hallucinations, self-injury and suicidal ideas. The patient is not nervous/anxious and is not hyperactive.    All other systems reviewed and are negative.      Vitals:    03/19/24 1645   BP: 122/62   BP Location: Right arm   Patient Position: Sitting   Cuff Size: Large   Pulse: 69   SpO2: 98%   Weight: (!) 145 kg (320 lb)   Height: 5' 8\" (1.727 m)     Physical Exam:  Physical Exam   Constitutional: She is oriented to person, place, and time. No distress.   Obesity, pleasant   HENT:   Head: Normocephalic and atraumatic.   Right Ear: External ear normal.   Left Ear: External ear normal.   Eyes: Conjunctivae are normal. Pupils are equal, round, and reactive to light. Right eye exhibits no discharge. Left eye exhibits no discharge. No scleral icterus.   Neck: Normal range of motion. Neck supple. No JVD present. No tracheal deviation present. No " "thyromegaly present.   Cardiovascular: Normal rate and regular rhythm.  Exam reveals gallop. Exam reveals no friction rub.    No murmur heard.  Pulmonary/Chest: Effort normal and breath sounds normal. No stridor. No respiratory distress. She has no wheezes. She has no rales. She exhibits no tenderness.   Abdominal: Soft. Bowel sounds are normal. She exhibits no distension and no mass. There is no tenderness. There is no rebound and no guarding.   Musculoskeletal: Normal range of motion. She exhibits no edema, tenderness or deformity.   Neurological: She is alert and oriented to person, place, and time. She has normal reflexes. No cranial nerve deficit. She exhibits normal muscle tone. Coordination normal.   Skin: Skin is warm and dry. No rash noted. She is not diaphoretic. No erythema. No pallor.   Psychiatric: She has a normal mood and affect. Her behavior is normal. Judgment and thought content normal.       Labs:   CBC with diff:      Invalid input(s): \"TOTALCELLSCOUNTED\", \"SEGS%\", \"GRANS%\", \"LYMPHS%\", \"EOS%\", \"BASO%\", \"ABNEUT\", \"ABGRANS\", \"ABLYMPHS\", \"ABMOMOS\", \"ABEOS\", \"ABBASO\"    CMP:      Invalid input(s): \"ALBUMIN\"    Magnesium:    Coags:    TSH:    Lipid Profile:    Hgb A1c:    NT-proBNP: No results for input(s): \"NTBNP\" in the last 72 hours.     Imaging & Testing   I have personally reviewed pertinent reports.      EKG: Personally reviewed.    Normal sinus rhythm LBBB    Cardiac testing:   Results for orders placed during the hospital encounter of 17   Echo complete with contrast if indicated    Narrative [place] [id number] [phone]    Transthoracic Echocardiogram  2D, M-mode, Doppler, and Color Doppler    Study date:  2017    Patient: PIOTR REYES  MR number: [id number]  Account number: [id number]  : 1965  Age: 51 years  Gender: Female  Status: Routine  Location: Echo lab  Height: 69 in  Weight: 348.3 lb  BP: 128/ 74 " mmHg    Indications: Heart Failure    Diagnoses: 428.9 - HEART FAILURE NOS    Sonographer:  TEETEE Colon  Primary Physician:  Bipin Ramirez DO  Referring Physician:  Spenser Servin MD  Group:  HAZEL Lozano  RN:  JEANNETTE Rodriguez  Interpreting Physician:  Bolivar Dorsey MD    SUMMARY    LEFT VENTRICLE:  The ventricle was mildly dilated.  Systolic function was mildly to moderately reduced. Ejection fraction was  estimated in the range of 35 % to 45 % to be 40 %. difficult to assess accurate  EF due to limited study and paradoxical septal motion, consider MUGA or Cardiac  MRI as confirmation  There was mild diffuse hypokinesis with paradoxical septal motion.  Wall thickness was mildly increased.  There was mild concentric hypertrophy.    RIGHT VENTRICLE:  The tricuspid jet envelope definition was inadequate for estimation of RV  systolic pressure. There are no indirect findings (abnormal RV volume or  geometry, altered pulmonary flow velocity profile, or leftward septal  displacement) which would suggest moderate or severe pulmonary hypertension.    LEFT ATRIUM:  The atrium was mildly dilated.    MITRAL VALVE:  There was trace regurgitation.    HISTORY: PRIOR HISTORY: HTN, DM, Anxiety, Asthma, Arthritis    PROCEDURE: The procedure was performed in the echo lab. This was a routine  study. The transthoracic approach was used. The study included complete 2D  imaging, M-mode, complete spectral Doppler, and color Doppler. The heart rate  was 90 bpm, at the start of the study. Intravenous contrast (Definity solution  [1.3 ml Definity/8.7ml normal saline solution], 2 ml) was administered to  opacify the left ventricle. Intravenous contrast ( 1 ml) was administered.  Echocardiographic views were limited due to poor acoustic window availability,  decreased penetration, and lung interference. This was a technically difficult  study.    LEFT VENTRICLE: The ventricle was mildly dilated. Systolic function was  mildly  to moderately reduced. Ejection fraction was estimated in the range of 35 % to  45 % to be 40 %. difficult to assess accurate EF due to limited study and  paradoxical septal motion, consider MUGA or Cardiac MRI as confirmation There  was mild diffuse hypokinesis with paradoxical septal motion. Wall thickness was  mildly increased. There was mild concentric hypertrophy. DOPPLER: Left  ventricular diastolic function parameters were normal for the patient's age.    RIGHT VENTRICLE: The size was normal. Systolic function was normal. DOPPLER:  The tricuspid jet envelope definition was inadequate for estimation of RV  systolic pressure. There are no indirect findings (abnormal RV volume or  geometry, altered pulmonary flow velocity profile, or leftward septal  displacement) which would suggest moderate or severe pulmonary hypertension.    LEFT ATRIUM: The atrium was mildly dilated.    RIGHT ATRIUM: Size was normal.    MITRAL VALVE: Valve structure was normal. There was normal leaflet separation.  DOPPLER: The transmitral velocity was within the normal range. There was no  evidence for stenosis. There was trace regurgitation.    AORTIC VALVE: The valve was trileaflet. Leaflets exhibited normal thickness and  normal cuspal separation. DOPPLER: Transaortic velocity was within the normal  range. There was no evidence for stenosis. There was no regurgitation.    TRICUSPID VALVE: DOPPLER: There was no significant regurgitation.    PERICARDIUM: There was no thickening or calcification. There was no pericardial  effusion.    AORTA: The root exhibited normal size.    SYSTEMIC VEINS: IVC: The inferior vena cava was not well visualized.    SYSTEM MEASUREMENT TABLES    2D mode  AoR Diam 2D: 3.3 cm  LA Diam (2D): 4.9 cm  LA/Ao (2D): 1.48  FS (2D Teich): 15.2 %  IVSd (2D): 1.18 cm  LVDEV: 170 cm³  LVESV: 116 cm³  LVIDd(2D): 5.85 cm  LVISd (2D): 4.96 cm  LVPWd (2D): 1.18 cm  SV (Teich): 54 cm³    Apical four chamber  LVEF A4C:  "33 %    Apical two chamber  LA Area: 26.8 cm squared  LA Volume: 91 cm³    Unspecified Scan Mode  MV Peak A Dejan: 563 mm/s  MV Peak E Dejan. Mean: 1110 mm/s  MVA (PHT): 4.89 cm squared  PHT: 45 ms  Max P mm[Hg]  V Max: 2590 mm/s  Vmax: 2610 mm/s  RA Area: 13.7 cm squared  RA Volume: 35.4 cm³  TAPSE: 2 cm    [facility]    Prepared and electronically signed by    Bolivar Dorsey MD  Signed 2017 11:04:22       10/6/20: normal sinus rhythm LBBB qrs 188    Spenser Servin MD [facility]  Please call with any questions or suggestions    A description of the counseling:   Goals and Barriers:  Patient's ability to self care:  Medication side effect reviewed with patient in detail and all their questions answered.    \"This note has been constructed using a voice recognition system.Therefore there may be syntax, spelling, and/or grammatical errors. Please call if you have any questions. \"    "

## 2024-04-05 ENCOUNTER — OFFICE VISIT (OUTPATIENT)
Age: 59
End: 2024-04-05
Payer: COMMERCIAL

## 2024-04-05 VITALS
HEART RATE: 74 BPM | OXYGEN SATURATION: 96 % | BODY MASS INDEX: 47.93 KG/M2 | RESPIRATION RATE: 18 BRPM | DIASTOLIC BLOOD PRESSURE: 68 MMHG | TEMPERATURE: 95.9 F | SYSTOLIC BLOOD PRESSURE: 128 MMHG | WEIGHT: 293 LBS

## 2024-04-05 DIAGNOSIS — H10.32 ACUTE CONJUNCTIVITIS OF LEFT EYE, UNSPECIFIED ACUTE CONJUNCTIVITIS TYPE: Primary | ICD-10-CM

## 2024-04-05 DIAGNOSIS — S86.812A STRAIN OF LEFT CALF MUSCLE: ICD-10-CM

## 2024-04-05 PROCEDURE — 99213 OFFICE O/P EST LOW 20 MIN: CPT | Performed by: PHYSICIAN ASSISTANT

## 2024-04-05 RX ORDER — CIPROFLOXACIN HYDROCHLORIDE 3.5 MG/ML
1 SOLUTION/ DROPS TOPICAL
Qty: 5 ML | Refills: 0 | Status: SHIPPED | OUTPATIENT
Start: 2024-04-05 | End: 2024-04-12

## 2024-04-05 NOTE — LETTER
April 5, 2024     Patient: Analilia Olivo   YOB: 1965   Date of Visit: 4/5/2024       To Whom It May Concern:    It is my medical opinion that Analilia Olivo may return to work on 4/8/24. Excused from work today .    If you have any questions or concerns, please don't hesitate to call.         Sincerely,        Shreyas Proctor PA-C    CC: No Recipients

## 2024-04-05 NOTE — PROGRESS NOTES
"[facility]        NAME: Analilia Olivo is a 58 y.o. female  : 1965    MRN: [id number]  DATE: 2024  TIME: 8:14 PM    Assessment and Plan   Acute conjunctivitis of left eye, unspecified acute conjunctivitis type [H10.32]  1. Acute conjunctivitis of left eye, unspecified acute conjunctivitis type  ciprofloxacin (CILOXAN) 0.3 % ophthalmic solution      2. Strain of left calf muscle  Cam Boot    Ambulatory Referral to Orthopedic Surgery        59 yo F with left calf strain placed into CAM boot, left eye conjunctivitis rx cipro drops advised to stop wearing contacts until infection resolved.  Follow-up with Ortho regarding calf strain    Patient Instructions   There are no Patient Instructions on file for this visit.    Follow up with PCP in 3-5 days.  Proceed to  ER if symptoms worsen.    If tests are performed, our office will contact you with results only if   changes need to made to the care plan discussed with you at the visit.   [facility]     Chief Complaint     Chief Complaint   Patient presents with   • Eye Problem     Irritation, redness, swelling L eye X 2 days, fell asleep with contacts in. Now having drainage. Also had a \"ripping\" sensation in L calf while descending steps last PM. Now persistent ache in calf.           History of Present Illness       HPI  Pt reports left eye pain redness, blurriness, mucoid discharge for 3 days. She does wear contacts. Has stopped wearing them since redness started. Has had persistent mucoid discharge. Normal visual acuity on exam today.  Also reports straining calf last night when walking up stairs. Pain with walking and planting. No numbness or tingling.     Review of Systems   Review of Systems  Except as noted in HPI    Current Medications       Current Outpatient Medications:   •  atorvastatin (LIPITOR) 10 mg tablet, TAKE 1 TABLET DAILY, Disp: 90 tablet, Rfl: 1  •  carvedilol (COREG) 6.25 mg tablet, " Take 1 tablet (6.25 mg total) by mouth 2 (two) times a day with meals, Disp: 180 tablet, Rfl: 3  •  ciprofloxacin (CILOXAN) 0.3 % ophthalmic solution, Administer 1 drop into the left eye every 2 (two) hours for 7 days, Disp: 5 mL, Rfl: 0  •  sacubitril-valsartan (Entresto) 24-26 MG TABS, Take 1 tablet by mouth 2 (two) times a day, Disp: 180 tablet, Rfl: 1  •  spironolactone (ALDACTONE) 25 mg tablet, Take 1 tablet (25 mg total) by mouth daily, Disp: 90 tablet, Rfl: 3  •  terconazole (TERAZOL 7) 0.4 % vaginal cream, Insert 1 applicator into the vagina daily at bedtime, Disp: 45 g, Rfl: 0  •  torsemide (DEMADEX) 10 mg tablet, Once a day on Mon-Friday- Sunday as needed, Disp: 45 tablet, Rfl: 1  •  triamcinolone (KENALOG) 0.1 % cream, Apply topically 2 (two) times a day Sparingly (short term) to affected area: abdomen, arms, legs, Disp: 80 g, Rfl: 1  •  estradiol (ESTRACE VAGINAL) 0.1 mg/g vaginal cream, Use a pea sized amount twice weekly (Patient not taking: Reported on 4/5/2024), Disp: 42.5 g, Rfl: 1  •  sertraline (ZOLOFT) 50 mg tablet, Take 1 tablet (50 mg total) by mouth daily (Patient not taking: Reported on 4/5/2024), Disp: 90 tablet, Rfl: 1  •  valACYclovir (VALTREX) 500 mg tablet, Take 1 tablet (500 mg total) by mouth daily Take 1 tablet by mouth daily for suppression. Start after you finish the 5 day treatment., Disp: 30 tablet, Rfl: 1    Current Allergies     Allergies as of 04/05/2024   • (No Known Allergies)            The following portions of the patient's history were reviewed and updated as appropriate: allergies, current medications, past family history, past medical history, past social history, past surgical history and problem list.     Past Medical History:   Diagnosis Date   • Achilles tendinitis, unspecified leg 12/14/2006   • Anxiety    • Arthritis     Last Assessed: 9/28/2017   • Asthma     Last Assessed: 9/28/2017   • Bilateral leg edema    • BMI 50.0-59.9, adult (HCC)    • Breast lump  07/11/2008   • Chest pain on breathing     Last Assessed: 3/9/2014   • Chronic kidney disease, stage 1     Last Assessed: 3/1/2014   • Clotting disorder (HCC)    • Diabetes mellitus (HCC)     Last Assessed: 9/28/2017 Type 2 with renal manifestations, controlled   • Eczema    • Environmental allergies     Last Assessed: 9/28/2017   • Exposure to potentially hazardous body fluids     Last Assessed: 4/10/2015   • Heart failure, left, with LVEF 41-49% (HCC)    • Hypertension 05/30/2012    Benign Essential   • Infectious mononucleosis     Resolved: 8/19/2015   • Lateral epicondylitis 02/17/2011    unspecified laterality   • LBBB (left bundle branch block)    • LBBB (left bundle branch block) 2/8/2018   • Lipoma     Last Assessesd: 10/13/2014   • 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   • Psychiatric disorder    • Sialodochitis 06/01/2010   • Situational anxiety     Last Assessed: 10/22/2015   • Sleep apnea    • Tongue disorder     Last Assessed: 6/9/2016   • Type 2 diabetes mellitus with kidney complication, without long-term current use of insulin (HCC)        Past Surgical History:   Procedure Laterality Date   • CERVICAL BIOPSY  W/ LOOP ELECTRODE EXCISION  1992   • CHOLECYSTECTOMY     • COLONOSCOPY  1997   • ESSURE TUBAL LIGATION  2010   • GALLBLADDER SURGERY  1996   • GYNECOLOGIC CRYOSURGERY  1992       Family History   Problem Relation Age of Onset   • Heart attack Mother    • Diabetes type II Mother    • Heart disease Father    • Atrial fibrillation Father    • Arthritis Father    • Arthritis Sister    • Other Other         Cardiac Disorder   • Arthritis Family    • Breast cancer Maternal Grandmother 60   • Heart disease Maternal Grandmother    • Bone cancer Maternal Grandfather    • Brain cancer Paternal Grandfather    • No Known Problems Maternal Aunt          Medications have been verified.        Objective   /68 (BP Location: Left arm, Patient Position: Sitting, Cuff Size: Large)   Pulse 74   Temp (!) 95.9 °F  "(35.5 °C) (Tympanic)   Resp 18   Wt (!) 143 kg (315 lb 3.2 oz)   LMP 02/15/2023 (Approximate)   SpO2 96%   BMI 47.93 kg/m²   Patient's last menstrual period was 02/15/2023 (approximate).       Physical Exam     Physical Exam  Constitutional:       General: She is not in acute distress.     Appearance: She is well-developed.   HENT:      Head: Normocephalic and atraumatic.   Eyes:      General: No scleral icterus.        Left eye: Discharge present.     Extraocular Movements: Extraocular movements intact.      Pupils: Pupils are equal, round, and reactive to light.      Comments: Conjunctival injection left eye   Neck:      Trachea: No tracheal deviation.   Pulmonary:      Effort: Pulmonary effort is normal. No respiratory distress.      Breath sounds: No stridor.   Musculoskeletal:         General: Tenderness and signs of injury present. No swelling.      Cervical back: Normal range of motion.      Comments: Tenderness palpation over the left calf dorsiflexion plantarflexion are intact there is no palpable defect in the Achilles tendon   Skin:     General: Skin is warm and dry.      Findings: No erythema.   Neurological:      Mental Status: She is alert and oriented to person, place, and time.   Psychiatric:         Behavior: Behavior normal.         Ortho Exam        Procedures  No Procedures performed today        Note: Portions of this record may have been created with voice recognition software. Occasional wrong word or \"sound a like\" substitutions may have occurred due to the inherent limitations of voice recognition software. Please read the chart carefully and recognize, using context, where substitutions have occurred.*      "

## 2024-04-15 DIAGNOSIS — E78.49 OTHER HYPERLIPIDEMIA: ICD-10-CM

## 2024-04-15 RX ORDER — ATORVASTATIN CALCIUM 10 MG/1
TABLET, FILM COATED ORAL
Qty: 90 TABLET | Refills: 1 | Status: SHIPPED | OUTPATIENT
Start: 2024-04-15

## 2024-04-30 ENCOUNTER — NURSE TRIAGE (OUTPATIENT)
Age: 59
End: 2024-04-30

## 2024-04-30 ENCOUNTER — TELEPHONE (OUTPATIENT)
Age: 59
End: 2024-04-30

## 2024-04-30 DIAGNOSIS — A49.3 MYCOPLASMA INFECTION: Primary | ICD-10-CM

## 2024-04-30 RX ORDER — DOXYCYCLINE 100 MG/1
100 TABLET ORAL 2 TIMES DAILY
Qty: 14 TABLET | Refills: 0 | Status: SHIPPED | OUTPATIENT
Start: 2024-04-30 | End: 2024-05-07

## 2024-04-30 RX ORDER — MOXIFLOXACIN HYDROCHLORIDE 400 MG/1
400 TABLET ORAL DAILY
Qty: 7 TABLET | Refills: 0 | Status: SHIPPED | OUTPATIENT
Start: 2024-05-07 | End: 2024-05-14

## 2024-04-30 NOTE — TELEPHONE ENCOUNTER
"Answer Assessment - Initial Assessment Questions  1. SYMPTOM: \"What's the main symptom you're concerned about?\" (e.g., pain, itching, dryness)      Itching, burning at fessure near anal area   2. LOCATION: \"Where is the  s/s located?\" (e.g., inside/outside, left/right)      Outside of vagina and near top/clitoris area; worse at night  3. ONSET: \"When did the  s/s  start?\"      A week  4. PAIN: \"Is there any pain?\" If Yes, ask: \"How bad is it?\" (Scale: 1-10; mild, moderate, severe)      Denies  5. ITCHING: \"Is there any itching?\" If Yes, ask: \"How bad is it?\" (Scale: 1-10; mild, moderate, severe)      9/10 at night time  6. CAUSE: \"What do you think is causing the discharge?\" \"Have you had the same problem before? What happened then?\"      Denies  7. OTHER SYMPTOMS: \"Do you have any other symptoms?\" (e.g., fever, itching, vaginal bleeding, pain with urination, injury to genital area, vaginal foreign body)      Denies  8. PREGNANCY: \"Is there any chance you are pregnant?\" \"When was your last menstrual period?\"      Denies    Protocols used: Vaginal Symptoms-ADULT-[place]    "

## 2024-04-30 NOTE — TELEPHONE ENCOUNTER
Called to check in and review recommendations    No answer    LVM indicating that the CDC recommendation when that there is no testing for sensitivities available is doxycycline 100 mg twice a day for 7 days followed by moxifloxacin 400 mg once a day for 7 days.    Should abstain from intercourse while receiving treatment and for 7 days after completion to prevent reexposure    Should make an appointment at the office to check in in a few weeks to make sure responding appropriately to treatment    Can discuss testing at a later date    CFW pool--please help her make follow-up appt.

## 2024-04-30 NOTE — TELEPHONE ENCOUNTER
Pt calling after being informed by sexual partner that he tested positive for Mycoplasma genitalium. Pt also noted itching on the external area of vagina. Worse at night (9/10). Denies vaginal discharge or other symptoms. Wanted to see if able to get prescription or if needs to come in to be tested. RN advised will discuss with provider and staff will call back with recommendations. Phone number on file verified. Pt agreeable to plan.

## 2024-05-14 LAB
ALBUMIN SERPL-MCNC: 4.3 G/DL (ref 3.8–4.9)
ALBUMIN/GLOB SERPL: 1.3 {RATIO} (ref 1.2–2.2)
ALP SERPL-CCNC: 79 IU/L (ref 44–121)
ALT SERPL-CCNC: 13 IU/L (ref 0–32)
AST SERPL-CCNC: 17 IU/L (ref 0–40)
BILIRUB SERPL-MCNC: 0.9 MG/DL (ref 0–1.2)
BUN SERPL-MCNC: 16 MG/DL (ref 6–24)
BUN/CREAT SERPL: 21 (ref 9–23)
CALCIUM SERPL-MCNC: 9.3 MG/DL (ref 8.7–10.2)
CHLORIDE SERPL-SCNC: 102 MMOL/L (ref 96–106)
CO2 SERPL-SCNC: 24 MMOL/L (ref 20–29)
CREAT SERPL-MCNC: 0.78 MG/DL (ref 0.57–1)
EGFR: 89 ML/MIN/1.73
GLOBULIN SER-MCNC: 3.2 G/DL (ref 1.5–4.5)
GLUCOSE SERPL-MCNC: 132 MG/DL (ref 70–99)
HBA1C MFR BLD: 7.4 % (ref 4.8–5.6)
POTASSIUM SERPL-SCNC: 4.4 MMOL/L (ref 3.5–5.2)
PROT SERPL-MCNC: 7.5 G/DL (ref 6–8.5)
SODIUM SERPL-SCNC: 140 MMOL/L (ref 134–144)

## 2024-05-18 RX ORDER — EMPAGLIFLOZIN 25 MG/1
25 TABLET, FILM COATED ORAL DAILY
COMMUNITY
Start: 2024-03-19

## 2024-05-21 ENCOUNTER — OFFICE VISIT (OUTPATIENT)
Dept: FAMILY MEDICINE CLINIC | Facility: CLINIC | Age: 59
End: 2024-05-21
Payer: COMMERCIAL

## 2024-05-21 VITALS
RESPIRATION RATE: 20 BRPM | HEIGHT: 68 IN | BODY MASS INDEX: 44.41 KG/M2 | HEART RATE: 88 BPM | WEIGHT: 293 LBS | DIASTOLIC BLOOD PRESSURE: 70 MMHG | TEMPERATURE: 98.3 F | SYSTOLIC BLOOD PRESSURE: 100 MMHG

## 2024-05-21 DIAGNOSIS — N18.1 CKD STAGE 1 DUE TO TYPE 2 DIABETES MELLITUS  (HCC): ICD-10-CM

## 2024-05-21 DIAGNOSIS — N18.1 TYPE 2 DIABETES MELLITUS WITH STAGE 1 CHRONIC KIDNEY DISEASE, WITHOUT LONG-TERM CURRENT USE OF INSULIN  (HCC): Primary | ICD-10-CM

## 2024-05-21 DIAGNOSIS — G56.01 CARPAL TUNNEL SYNDROME OF RIGHT WRIST: ICD-10-CM

## 2024-05-21 DIAGNOSIS — Z12.11 COLON CANCER SCREENING: ICD-10-CM

## 2024-05-21 DIAGNOSIS — E11.22 TYPE 2 DIABETES MELLITUS WITH STAGE 1 CHRONIC KIDNEY DISEASE, WITHOUT LONG-TERM CURRENT USE OF INSULIN  (HCC): Primary | ICD-10-CM

## 2024-05-21 DIAGNOSIS — E11.22 CKD STAGE 1 DUE TO TYPE 2 DIABETES MELLITUS  (HCC): ICD-10-CM

## 2024-05-21 PROCEDURE — 99214 OFFICE O/P EST MOD 30 MIN: CPT | Performed by: FAMILY MEDICINE

## 2024-05-21 NOTE — PROGRESS NOTES
Assessment/Plan:    No problem-specific Assessment & Plan notes found for this encounter.    DM2 not ideal control  Mounjaro if covered and available but start Januvia 100mg otherwise, tolerated in past    GARRY  Lots of domestic stress issues with son  Suggested starting zoloft but aware stress will not change unfortunately    Bmi aware  Try to restart exercise program    CHF  GLP1 suggested by cardiology  Ozempic not effective in past aware    Tinel pos  Right CTS  Kyler neg  Offer hand ortho    Please restart the januvia 100mg qd but if mounjaro injections are covered by your plan, we can do that instead of januvia.     Diagnoses and all orders for this visit:    Type 2 diabetes mellitus with stage 1 chronic kidney disease, without long-term current use of insulin  (HCC)  -     sitaGLIPtin (JANUVIA) 100 mg tablet; Take 1 tablet (100 mg total) by mouth daily  -     Comprehensive metabolic panel; Future  -     Hemoglobin A1C; Future    Colon cancer screening  -     Ambulatory referral to Gastroenterology; Future    Carpal tunnel syndrome of right wrist  -     Ambulatory referral to Orthopedic Surgery; Future    CKD stage 1 due to type 2 diabetes mellitus  (HCC)    Other orders  -     Jardiance 25 MG TABS; 25 mg in the morning        Return in about 13 weeks (around 8/20/2024) for Recheck.    Subjective:      Patient ID: Analilia Olivo is a 58 y.o. female.    Chief Complaint   Patient presents with    Follow-up    Diabetes     Jmoyle LPN       HPI  About 5w  All fingers right hand  Tingling  Hand palm and dorsum  Radiates up arm  No neck injury or mv  Not left hand at all    Not exercising  Did lost weight   Not eating due to stress    Not taking zoloft despite stress    Ozempic did not work    Tolerated metformin in past    The following portions of the patient's history were reviewed and updated as appropriate: allergies, current medications, past family history, past medical history, past social history, past  "surgical history and problem list.    Review of Systems   Constitutional:  Negative for fever.   Respiratory:  Negative for shortness of breath.          Current Outpatient Medications   Medication Sig Dispense Refill    atorvastatin (LIPITOR) 10 mg tablet TAKE 1 TABLET DAILY 90 tablet 1    carvedilol (COREG) 6.25 mg tablet Take 1 tablet (6.25 mg total) by mouth 2 (two) times a day with meals 180 tablet 3    Jardiance 25 MG TABS 25 mg in the morning      sacubitril-valsartan (Entresto) 24-26 MG TABS Take 1 tablet by mouth 2 (two) times a day 180 tablet 1    sitaGLIPtin (JANUVIA) 100 mg tablet Take 1 tablet (100 mg total) by mouth daily 90 tablet 1    spironolactone (ALDACTONE) 25 mg tablet Take 1 tablet (25 mg total) by mouth daily 90 tablet 3    terconazole (TERAZOL 7) 0.4 % vaginal cream Insert 1 applicator into the vagina daily at bedtime 45 g 0    torsemide (DEMADEX) 10 mg tablet Once a day on Mon-Friday- Sunday as needed 45 tablet 1    triamcinolone (KENALOG) 0.1 % cream Apply topically 2 (two) times a day Sparingly (short term) to affected area: abdomen, arms, legs 80 g 1    valACYclovir (VALTREX) 500 mg tablet Take 1 tablet (500 mg total) by mouth daily Take 1 tablet by mouth daily for suppression. Start after you finish the 5 day treatment. 30 tablet 1     No current facility-administered medications for this visit.       Objective:    /70   Pulse 88   Temp 98.3 °F (36.8 °C)   Resp 20   Ht 5' 8\" (1.727 m)   Wt (!) 139 kg (307 lb)   LMP 02/15/2023 (Approximate)   BMI 46.68 kg/m²        Physical Exam  Vitals and nursing note reviewed.   Constitutional:       General: She is not in acute distress.     Appearance: She is well-developed. She is not ill-appearing.   HENT:      Head: Normocephalic.      Right Ear: Tympanic membrane normal.      Left Ear: Tympanic membrane normal.   Eyes:      General: No scleral icterus.     Conjunctiva/sclera: Conjunctivae normal.   Cardiovascular:      Rate and " Rhythm: Normal rate and regular rhythm.      Pulses: no weak pulses.           Dorsalis pedis pulses are 2+ on the right side and 2+ on the left side.      Heart sounds: No murmur heard.  Pulmonary:      Effort: Pulmonary effort is normal. No respiratory distress.      Breath sounds: No wheezing.   Abdominal:      Palpations: Abdomen is soft.   Musculoskeletal:         General: No deformity.      Cervical back: Neck supple.      Right lower leg: No edema.      Left lower leg: No edema.   Skin:     General: Skin is warm and dry.      Coloration: Skin is not pale.   Neurological:      Mental Status: She is alert.      Motor: No weakness.      Gait: Gait normal.   Psychiatric:         Mood and Affect: Mood normal.         Behavior: Behavior normal.         Thought Content: Thought content normal.       Diabetic Foot Exam    Patient's shoes and socks removed.    Right Foot/Ankle   Right Foot Inspection  Skin Exam: skin intact. No abnormal color.     Sensory   Monofilament testing: intact    Vascular  The right DP pulse is 2+.     Left Foot/Ankle  Left Foot Inspection  Skin Exam: skin intact. Normal color.     Sensory   Monofilament testing: intact    Vascular  The left DP pulse is 2+.     Assign Risk Category  No deformity present  No loss of protective sensation  No weak pulses  Risk: 0           Bipin Ramirez DO

## 2024-05-21 NOTE — PATIENT INSTRUCTIONS
Please restart the januvia 100mg qd but if mounjaro injections are covered by your plan, we can do that instead of januvia.

## 2024-10-14 DIAGNOSIS — E78.49 OTHER HYPERLIPIDEMIA: ICD-10-CM

## 2024-10-15 RX ORDER — ATORVASTATIN CALCIUM 10 MG/1
TABLET, FILM COATED ORAL
Qty: 90 TABLET | Refills: 0 | Status: SHIPPED | OUTPATIENT
Start: 2024-10-15

## 2024-12-09 DIAGNOSIS — I44.7 LBBB (LEFT BUNDLE BRANCH BLOCK): ICD-10-CM

## 2024-12-09 DIAGNOSIS — E78.49 OTHER HYPERLIPIDEMIA: ICD-10-CM

## 2024-12-09 NOTE — TELEPHONE ENCOUNTER
Patient needs her sacubitril-valsartan (Entresto) 24-26 MG TABS script sent to her other pharmacy because the medication is no longer carried by [facility].     Please send to  [id number] [place] - [address]     Please advise and give her a call once resolved , thank you!    [phone] (Mobile)

## 2024-12-10 RX ORDER — SACUBITRIL AND VALSARTAN 24; 26 MG/1; MG/1
1 TABLET, FILM COATED ORAL 2 TIMES DAILY
Qty: 180 TABLET | Refills: 0 | Status: SHIPPED | OUTPATIENT
Start: 2024-12-10

## 2025-01-10 ENCOUNTER — TELEPHONE (OUTPATIENT)
Dept: FAMILY MEDICINE CLINIC | Facility: CLINIC | Age: 60
End: 2025-01-10

## 2025-01-10 DIAGNOSIS — E78.49 OTHER HYPERLIPIDEMIA: ICD-10-CM

## 2025-01-14 RX ORDER — ATORVASTATIN CALCIUM 10 MG/1
10 TABLET, FILM COATED ORAL DAILY
Qty: 30 TABLET | Refills: 0 | Status: SHIPPED | OUTPATIENT
Start: 2025-01-14 | End: 2025-01-22 | Stop reason: SDUPTHER